# Patient Record
Sex: MALE | Race: WHITE | NOT HISPANIC OR LATINO | Employment: OTHER | URBAN - METROPOLITAN AREA
[De-identification: names, ages, dates, MRNs, and addresses within clinical notes are randomized per-mention and may not be internally consistent; named-entity substitution may affect disease eponyms.]

---

## 2019-06-20 ENCOUNTER — ANCILLARY PROCEDURE (OUTPATIENT)
Dept: GENERAL RADIOLOGY | Facility: OTHER | Age: 59
End: 2019-06-20
Attending: NURSE PRACTITIONER
Payer: COMMERCIAL

## 2019-06-20 ENCOUNTER — OFFICE VISIT (OUTPATIENT)
Dept: FAMILY MEDICINE | Facility: OTHER | Age: 59
End: 2019-06-20
Attending: NURSE PRACTITIONER
Payer: COMMERCIAL

## 2019-06-20 VITALS
DIASTOLIC BLOOD PRESSURE: 98 MMHG | HEART RATE: 91 BPM | OXYGEN SATURATION: 96 % | SYSTOLIC BLOOD PRESSURE: 132 MMHG | WEIGHT: 202 LBS | HEIGHT: 72 IN | BODY MASS INDEX: 27.36 KG/M2

## 2019-06-20 DIAGNOSIS — S89.91XA KNEE INJURY, RIGHT, INITIAL ENCOUNTER: ICD-10-CM

## 2019-06-20 DIAGNOSIS — M17.11 PRIMARY OSTEOARTHRITIS OF RIGHT KNEE: ICD-10-CM

## 2019-06-20 DIAGNOSIS — R03.0 ELEVATED BLOOD PRESSURE READING WITHOUT DIAGNOSIS OF HYPERTENSION: ICD-10-CM

## 2019-06-20 DIAGNOSIS — S89.91XA KNEE INJURY, RIGHT, INITIAL ENCOUNTER: Primary | ICD-10-CM

## 2019-06-20 PROCEDURE — 73562 X-RAY EXAM OF KNEE 3: CPT | Mod: TC

## 2019-06-20 PROCEDURE — 99214 OFFICE O/P EST MOD 30 MIN: CPT | Performed by: NURSE PRACTITIONER

## 2019-06-20 ASSESSMENT — MIFFLIN-ST. JEOR: SCORE: 1779.27

## 2019-06-20 ASSESSMENT — PAIN SCALES - GENERAL: PAINLEVEL: MODERATE PAIN (5)

## 2019-06-20 NOTE — PATIENT INSTRUCTIONS
Assessment & Plan     1. Knee injury, right, initial encounter  osteoarthritis  - XR KNEE RIGHT 3 VIEWS (Clinic Performed); Future    2. Primary osteoarthritis of right knee  Tylenol or ibuprofen per package direction  Ice, heat  I no improvement could consider referral to ortho for cortisone injection   - diclofenac (VOLTAREN) 1 % topical gel; Place 4 g onto the skin 4 times daily  Dispense: 100 g; Refill: 5     BMI:   Estimated body mass index is 27.4 kg/m  as calculated from the following:    Height as of this encounter: 1.829 m (6').    Weight as of this encounter: 91.6 kg (202 lb).         FUTURE APPOINTMENTS:       - Follow-up visit if no improvement or any worsening       Maddie Castillo NP  [facility]        Patient Education     What Is Arthritis?  Arthritis is a disease that affects the joints. Joints are the parts where bones meet and move. It can affect any joint in your body. There are many types of arthritis, including osteoarthritis, rheumatoid arthritis, gout and lupus. If your symptoms are mild, medicines may be enough to ease pain and swelling. For more severe arthritis, you may need surgery to improve the condition of the joint or replace part or all of the joint.               What causes arthritis?  Cartilage is a smooth substance that protects the ends of your bones and provides cushioning. When you have arthritis, this cartilage breaks down and can no longer protect your bones. This can happen from an autoimmune disease. Or it can happen from wear and tear, infections, or trauma. The bones rub against each other, causing pain and swelling. Over time, small pieces of rough or splintered bone (bone spurs) may develop. The joint's range of motion can become limited.  Symptoms  Some of the more common symptoms of arthritis include:    Joint pain and stiffness. Pain and stiffness get worse with long periods of rest or using a joint too long or too hard.    Joints  that have lost normal shape and motion    Tender, inflamed joints. They may look red and feel warm.    Grinding or popping noise with joint movement    Feeling tired all the time  Reducing symptoms  Following a healthy lifestyle by losing weight and exercising can help ease symptoms of osteoarthritis. Strengthening muscles around the affected joint may will reduce the strain on the joint. Hot and cold packs may help. Over-the-counter and prescription medicines can be very helpful for arthritis. Talk with your healthcare provider about the best treatments for your condition.   Date Last Reviewed: 5/1/2017 2000-2018 [employer]. [address] [place] [id number]. All rights reserved. This information is not intended as a substitute for professional medical care. Always follow your healthcare professional's instructions.

## 2019-06-20 NOTE — NURSING NOTE
Chief Complaint   Patient presents with     Knee Injury       Initial BP (!) 140/96 (BP Location: Right arm, Patient Position: Chair, Cuff Size: Adult Large)   Pulse 91   Ht 1.829 m (6')   Wt 91.6 kg (202 lb)   SpO2 96%   BMI 27.40 kg/m   Estimated body mass index is 27.4 kg/m  as calculated from the following:    Height as of this encounter: 1.829 m (6').    Weight as of this encounter: 91.6 kg (202 lb).  Medication Reconciliation: complete

## 2019-06-20 NOTE — PROGRESS NOTES
Subjective     Bird Cedeno is a 58 year old male who presents to clinic today for the following health issues:    HPI   Joint Pain    Onset: 3 months ago     Description:   Location: right knee  Character: Stabbing    Intensity: moderate    Progression of Symptoms: worse    Accompanying Signs & Symptoms:  Other symptoms: none    History:   Previous similar pain: no       Precipitating factors:   Trauma or overuse: YES- twisted it multiple times    Alleviating factors:  Improved by: support wrap and acetaminophen    Therapies Tried and outcome: support wrap, tylenol     He is very active - owns a resort.        There is no problem list on file for this patient.    Past Surgical History:   Procedure Laterality Date     APPENDECTOMY       GENITOURINARY SURGERY      circ     SOFT TISSUE SURGERY      wisdom teeth extracted       Social History     Tobacco Use     Smoking status: Never Smoker     Smokeless tobacco: Never Used   Substance Use Topics     Alcohol use: Yes     Family History   Problem Relation Age of Onset     Other Cancer Father         brain tumor- at 52     Diabetes No family hx of      Thyroid Disease No family hx of      Asthma No family hx of          Current Outpatient Medications   Medication Sig Dispense Refill     tamsulosin (FLOMAX) 0.4 MG 24 hr capsule Take 0.4 mg by mouth daily       No Known Allergies  No lab results found.   BP Readings from Last 3 Encounters:   19 (!) 140/96   03/10/15 120/82   03/03/15 108/64    Wt Readings from Last 3 Encounters:   19 91.6 kg (202 lb)   03/10/15 90.7 kg (200 lb)   03/03/15 98 kg (216 lb)              Reviewed and updated as needed this visit by Provider       Review of Systems   ROS COMP: Constitutional, HEENT, cardiovascular, pulmonary, gi and gu systems are negative, except as otherwise noted.      Objective    BP (!) 140/96 (BP Location: Right arm, Patient Position: Chair, Cuff Size: Adult Large)   Pulse 91   Ht 1.829 m (6')   Wt 91.6  kg (202 lb)   SpO2 96%   BMI 27.40 kg/m    Body mass index is 27.4 kg/m .  Physical Exam   GENERAL: healthy, alert and no distress  RESP: lungs clear to auscultation - no rales, rhonchi or wheezes  CV: regular rate and rhythm, normal S1 S2, no S3 or S4, no murmur, click or rub, no peripheral edema and peripheral pulses strong  MS: no gross musculoskeletal defects noted, no edema  MS: RLE exam shows swelling to patellar areas as well as crepitus with flexion. Negative valgus and varus stress tests, negative anterior drawer sign. He has a slight limp with ambulation.  Negative Jet's   PSYCH: mentation appears normal, affect normal/bright      EXAM:XR KNEE RT 3 VW      CLINICAL HISTORY: Patient Age  58 years Additional clinical info: Knee  injury, right, initial encounter      COMPARISON: None       TECHNIQUE: 3 views                                                                      IMPRESSION: Degenerative arthritis in the right knee with medial  compartment narrowing and tiny tricompartment marginal osteophytes.  Soft tissue swelling about the anterior aspect of the knee. Small  effusion.     JOHANNE MATTHEWS MD      Assessment & Plan     1. Knee injury, right, initial encounter  osteoarthritis  - XR KNEE RIGHT 3 VIEWS (Clinic Performed); Future    2. Primary osteoarthritis of right knee  Tylenol or ibuprofen per package direction  Ice, heat  I no improvement could consider referral to ortho for cortisone injection   - diclofenac (VOLTAREN) 1 % topical gel; Place 4 g onto the skin 4 times daily  Dispense: 100 g; Refill: 5    3. Elevated blood pressure reading without diagnosis of hypertension  2 elevated readings today, he will return in 1-2 weeks for nurse only blood pressure recheck, if elevated at that time will consider starting antihypertensive agent.         BMI:   Estimated body mass index is 27.4 kg/m  as calculated from the following:    Height as of this encounter: 1.829 m (6').    Weight as of this  encounter: 91.6 kg (202 lb).         FUTURE APPOINTMENTS:       - Follow-up visit if no improvement or any worsening     Kim Dickey, RN  Family Nurse Practitioner Student    Patient is seen in conjunction with NP student.  History is reviewed with patient and pertinent portions of the exam are repeated.  Assessment and plan is reviewed with the patient.    Maddie Castillo, NP  [facility]

## 2020-05-08 ENCOUNTER — OFFICE VISIT (OUTPATIENT)
Dept: FAMILY MEDICINE | Facility: OTHER | Age: 60
End: 2020-05-08
Attending: NURSE PRACTITIONER
Payer: COMMERCIAL

## 2020-05-08 ENCOUNTER — APPOINTMENT (OUTPATIENT)
Dept: CT IMAGING | Facility: HOSPITAL | Age: 60
End: 2020-05-08
Attending: EMERGENCY MEDICINE
Payer: COMMERCIAL

## 2020-05-08 ENCOUNTER — HOSPITAL ENCOUNTER (EMERGENCY)
Facility: HOSPITAL | Age: 60
Discharge: HOME | End: 2020-05-08
Attending: EMERGENCY MEDICINE | Admitting: EMERGENCY MEDICINE
Payer: COMMERCIAL

## 2020-05-08 ENCOUNTER — APPOINTMENT (OUTPATIENT)
Dept: GENERAL RADIOLOGY | Facility: HOSPITAL | Age: 60
End: 2020-05-08
Attending: EMERGENCY MEDICINE
Payer: COMMERCIAL

## 2020-05-08 VITALS
OXYGEN SATURATION: 96 % | BODY MASS INDEX: 27.67 KG/M2 | RESPIRATION RATE: 20 BRPM | DIASTOLIC BLOOD PRESSURE: 114 MMHG | HEART RATE: 112 BPM | WEIGHT: 204 LBS | SYSTOLIC BLOOD PRESSURE: 186 MMHG

## 2020-05-08 VITALS
WEIGHT: 204 LBS | TEMPERATURE: 98.5 F | HEIGHT: 76 IN | RESPIRATION RATE: 14 BRPM | OXYGEN SATURATION: 95 % | HEART RATE: 102 BPM | SYSTOLIC BLOOD PRESSURE: 174 MMHG | DIASTOLIC BLOOD PRESSURE: 115 MMHG | BODY MASS INDEX: 24.84 KG/M2

## 2020-05-08 DIAGNOSIS — I10 HYPERTENSION, UNSPECIFIED TYPE: ICD-10-CM

## 2020-05-08 DIAGNOSIS — R00.2 PALPITATIONS: ICD-10-CM

## 2020-05-08 DIAGNOSIS — R03.0 ELEVATED BLOOD PRESSURE READING WITHOUT DIAGNOSIS OF HYPERTENSION: Primary | ICD-10-CM

## 2020-05-08 LAB
ALBUMIN SERPL-MCNC: 4.5 G/DL (ref 3.4–5)
ALBUMIN UR-MCNC: NEGATIVE MG/DL
ALP SERPL-CCNC: 51 U/L (ref 40–150)
ALT SERPL W P-5'-P-CCNC: 40 U/L (ref 0–70)
AMPHETAMINES UR QL: NOT DETECTED NG/ML
ANION GAP SERPL CALCULATED.3IONS-SCNC: 5 MMOL/L (ref 3–14)
APPEARANCE UR: CLEAR
AST SERPL W P-5'-P-CCNC: 17 U/L (ref 0–45)
BACTERIA #/AREA URNS HPF: ABNORMAL /HPF
BARBITURATES UR QL SCN: NOT DETECTED NG/ML
BASOPHILS # BLD AUTO: 0 10E9/L (ref 0–0.2)
BASOPHILS NFR BLD AUTO: 0.6 %
BENZODIAZ UR QL SCN: NOT DETECTED NG/ML
BILIRUB SERPL-MCNC: 0.5 MG/DL (ref 0.2–1.3)
BILIRUB UR QL STRIP: NEGATIVE
BUN SERPL-MCNC: 16 MG/DL (ref 7–30)
BUPRENORPHINE UR QL: NOT DETECTED NG/ML
CALCIUM SERPL-MCNC: 9.7 MG/DL (ref 8.5–10.1)
CANNABINOIDS UR QL: NOT DETECTED NG/ML
CHLORIDE SERPL-SCNC: 107 MMOL/L (ref 94–109)
CO2 SERPL-SCNC: 26 MMOL/L (ref 20–32)
COCAINE UR QL SCN: NOT DETECTED NG/ML
COLOR UR AUTO: ABNORMAL
CREAT SERPL-MCNC: 1.15 MG/DL (ref 0.66–1.25)
D DIMER PPP FEU-MCNC: 0.3 UG/ML FEU (ref 0–0.5)
D-METHAMPHET UR QL: NOT DETECTED NG/ML
DIFFERENTIAL METHOD BLD: NORMAL
EOSINOPHIL # BLD AUTO: 0.2 10E9/L (ref 0–0.7)
EOSINOPHIL NFR BLD AUTO: 2.4 %
ERYTHROCYTE [DISTWIDTH] IN BLOOD BY AUTOMATED COUNT: 12.3 % (ref 10–15)
GFR SERPL CREATININE-BSD FRML MDRD: 69 ML/MIN/{1.73_M2}
GLUCOSE SERPL-MCNC: 107 MG/DL (ref 70–99)
GLUCOSE UR STRIP-MCNC: NEGATIVE MG/DL
HCT VFR BLD AUTO: 45.2 % (ref 40–53)
HGB BLD-MCNC: 15.3 G/DL (ref 13.3–17.7)
HGB UR QL STRIP: NEGATIVE
IMM GRANULOCYTES # BLD: 0 10E9/L (ref 0–0.4)
IMM GRANULOCYTES NFR BLD: 0.3 %
KETONES UR STRIP-MCNC: NEGATIVE MG/DL
LEUKOCYTE ESTERASE UR QL STRIP: NEGATIVE
LYMPHOCYTES # BLD AUTO: 1.7 10E9/L (ref 0.8–5.3)
LYMPHOCYTES NFR BLD AUTO: 25.6 %
MAGNESIUM SERPL-MCNC: 2.3 MG/DL (ref 1.6–2.3)
MCH RBC QN AUTO: 28.9 PG (ref 26.5–33)
MCHC RBC AUTO-ENTMCNC: 33.8 G/DL (ref 31.5–36.5)
MCV RBC AUTO: 85 FL (ref 78–100)
METHADONE UR QL SCN: NOT DETECTED NG/ML
MONOCYTES # BLD AUTO: 0.6 10E9/L (ref 0–1.3)
MONOCYTES NFR BLD AUTO: 8.6 %
NEUTROPHILS # BLD AUTO: 4.2 10E9/L (ref 1.6–8.3)
NEUTROPHILS NFR BLD AUTO: 62.5 %
NITRATE UR QL: NEGATIVE
NRBC # BLD AUTO: 0 10*3/UL
NRBC BLD AUTO-RTO: 0 /100
OPIATES UR QL SCN: NOT DETECTED NG/ML
OXYCODONE UR QL SCN: NOT DETECTED NG/ML
PCP UR QL SCN: NOT DETECTED NG/ML
PH UR STRIP: 6.5 PH (ref 4.7–8)
PLATELET # BLD AUTO: 244 10E9/L (ref 150–450)
POTASSIUM SERPL-SCNC: 3.9 MMOL/L (ref 3.4–5.3)
PROPOXYPH UR QL: NOT DETECTED NG/ML
PROT SERPL-MCNC: 8 G/DL (ref 6.8–8.8)
RBC # BLD AUTO: 5.29 10E12/L (ref 4.4–5.9)
RBC #/AREA URNS AUTO: <1 /HPF (ref 0–2)
SODIUM SERPL-SCNC: 138 MMOL/L (ref 133–144)
SOURCE: ABNORMAL
SP GR UR STRIP: 1.02 (ref 1–1.03)
TRICYCLICS UR QL SCN: NOT DETECTED NG/ML
TROPONIN I SERPL-MCNC: <0.015 UG/L (ref 0–0.04)
UROBILINOGEN UR STRIP-MCNC: NORMAL MG/DL (ref 0–2)
WBC # BLD AUTO: 6.7 10E9/L (ref 4–11)
WBC #/AREA URNS AUTO: 1 /HPF (ref 0–5)

## 2020-05-08 PROCEDURE — 80306 DRUG TEST PRSMV INSTRMNT: CPT | Performed by: EMERGENCY MEDICINE

## 2020-05-08 PROCEDURE — 99285 EMERGENCY DEPT VISIT HI MDM: CPT | Mod: 25

## 2020-05-08 PROCEDURE — 85379 FIBRIN DEGRADATION QUANT: CPT | Performed by: EMERGENCY MEDICINE

## 2020-05-08 PROCEDURE — 83735 ASSAY OF MAGNESIUM: CPT | Performed by: EMERGENCY MEDICINE

## 2020-05-08 PROCEDURE — 81001 URINALYSIS AUTO W/SCOPE: CPT | Mod: 59 | Performed by: EMERGENCY MEDICINE

## 2020-05-08 PROCEDURE — 71045 X-RAY EXAM CHEST 1 VIEW: CPT | Mod: TC

## 2020-05-08 PROCEDURE — 25800030 ZZH RX IP 258 OP 636: Performed by: EMERGENCY MEDICINE

## 2020-05-08 PROCEDURE — 93005 ELECTROCARDIOGRAM TRACING: CPT

## 2020-05-08 PROCEDURE — 96360 HYDRATION IV INFUSION INIT: CPT

## 2020-05-08 PROCEDURE — 99207 ZZC OFFICE-HOSPITAL ADMIT: CPT

## 2020-05-08 PROCEDURE — 85025 COMPLETE CBC W/AUTO DIFF WBC: CPT | Performed by: EMERGENCY MEDICINE

## 2020-05-08 PROCEDURE — 70450 CT HEAD/BRAIN W/O DYE: CPT | Mod: TC

## 2020-05-08 PROCEDURE — 25000132 ZZH RX MED GY IP 250 OP 250 PS 637: Performed by: EMERGENCY MEDICINE

## 2020-05-08 PROCEDURE — 93010 ELECTROCARDIOGRAM REPORT: CPT | Performed by: INTERNAL MEDICINE

## 2020-05-08 PROCEDURE — 99285 EMERGENCY DEPT VISIT HI MDM: CPT | Mod: Z6 | Performed by: EMERGENCY MEDICINE

## 2020-05-08 PROCEDURE — 84484 ASSAY OF TROPONIN QUANT: CPT | Performed by: EMERGENCY MEDICINE

## 2020-05-08 PROCEDURE — 36415 COLL VENOUS BLD VENIPUNCTURE: CPT | Performed by: EMERGENCY MEDICINE

## 2020-05-08 PROCEDURE — 80053 COMPREHEN METABOLIC PANEL: CPT | Performed by: EMERGENCY MEDICINE

## 2020-05-08 PROCEDURE — 96361 HYDRATE IV INFUSION ADD-ON: CPT

## 2020-05-08 RX ORDER — AMLODIPINE BESYLATE 5 MG/1
5 TABLET ORAL DAILY
Qty: 14 TABLET | Refills: 0 | Status: SHIPPED | OUTPATIENT
Start: 2020-05-09 | End: 2020-05-11

## 2020-05-08 RX ORDER — AMLODIPINE BESYLATE 5 MG/1
5 TABLET ORAL ONCE
Status: COMPLETED | OUTPATIENT
Start: 2020-05-08 | End: 2020-05-08

## 2020-05-08 RX ORDER — SODIUM CHLORIDE 9 MG/ML
1000 INJECTION, SOLUTION INTRAVENOUS CONTINUOUS
Status: DISCONTINUED | OUTPATIENT
Start: 2020-05-08 | End: 2020-05-08 | Stop reason: HOSPADM

## 2020-05-08 RX ADMIN — SODIUM CHLORIDE 1000 ML: 9 INJECTION, SOLUTION INTRAVENOUS at 15:53

## 2020-05-08 RX ADMIN — SODIUM CHLORIDE 1000 ML: 9 INJECTION, SOLUTION INTRAVENOUS at 17:09

## 2020-05-08 RX ADMIN — AMLODIPINE BESYLATE 5 MG: 5 TABLET ORAL at 18:25

## 2020-05-08 ASSESSMENT — ENCOUNTER SYMPTOMS
SHORTNESS OF BREATH: 0
CONSTITUTIONAL NEGATIVE: 1
EYES NEGATIVE: 1
MUSCULOSKELETAL NEGATIVE: 1
MYALGIAS: 0
ENDOCRINE NEGATIVE: 1
PSYCHIATRIC NEGATIVE: 1
HEMATOLOGIC/LYMPHATIC NEGATIVE: 1
PALPITATIONS: 1
NECK PAIN: 0
GASTROINTESTINAL NEGATIVE: 1
NECK STIFFNESS: 0
NEUROLOGICAL NEGATIVE: 1
RESPIRATORY NEGATIVE: 1
ALLERGIC/IMMUNOLOGIC NEGATIVE: 1
COUGH: 0
DIZZINESS: 0

## 2020-05-08 ASSESSMENT — PAIN SCALES - GENERAL: PAINLEVEL: NO PAIN (0)

## 2020-05-08 ASSESSMENT — MIFFLIN-ST. JEOR: SCORE: 1846.84

## 2020-05-08 NOTE — ED AVS SNAPSHOT
[facility]  [address]  [place] [address]  Phone:  [phone]                                    Bird Cedeno   MRN: [id number]    Department:  [facility]   Date of Visit:  5/8/2020           After Visit Summary Signature Page    I have received my discharge instructions, and my questions have been answered. I have discussed any challenges I see with this plan with the nurse or doctor.    ..........................................................................................................................................  Patient/Patient Representative Signature      ..........................................................................................................................................  Patient Representative Print Name and Relationship to Patient    ..................................................               ................................................  Date                                   Time    ..........................................................................................................................................  Reviewed by Signature/Title    ...................................................              ..............................................  Date                                               Time          22EPIC Rev 08/18

## 2020-05-08 NOTE — PROGRESS NOTES
Subjective     Bird Cedeno is a 59 year old male who presents to clinic today for the following health issues:    HPI   Dizziness      Duration: 1 month ago    Description   Feeling faint:  no   Feeling like the surroundings are moving: no   Loss of consciousness or falls: no     Intensity:  mild    Accompanying signs and symptoms:   Nausea/vomitting: no   Palpitations: YES- currently   Weakness in arms or legs: YES- tingling in left arm  Vision or speech changes: no   Ringing in ears (Tinnitus): YES- right ear occasionally  Hearing loss related to dizziness: no   Other (fevers/chills/sweating/dyspnea): YES- sweating    History (similar episodes/head trauma/previous evaluation/recent bleeding): previous escalating of blood pressure, hit head 3 months ago    Precipitating or alleviating factors (new meds/chemicals): None  Worse with activity/head movement: no     Therapies tried and outcome: eating right, working out, laying down after dizziness     Patient Active Problem List   Diagnosis     Benign non-nodular prostatic hyperplasia with lower urinary tract symptoms     Past Surgical History:   Procedure Laterality Date     APPENDECTOMY       GENITOURINARY SURGERY      circ     SOFT TISSUE SURGERY      wisdom teeth extracted       Social History     Tobacco Use     Smoking status: Never Smoker     Smokeless tobacco: Never Used   Substance Use Topics     Alcohol use: Yes     Family History   Problem Relation Age of Onset     Other Cancer Father         brain tumor- at 52     Diabetes No family hx of      Thyroid Disease No family hx of      Asthma No family hx of          Current Outpatient Medications   Medication Sig Dispense Refill     tamsulosin (FLOMAX) 0.4 MG 24 hr capsule Take 0.4 mg by mouth daily       diclofenac (VOLTAREN) 1 % topical gel Place 4 g onto the skin 4 times daily (Patient not taking: Reported on 2020) 100 g 5     No Known Allergies  No lab results found.   BP Readings from Last 3  Encounters:   05/08/20 (!) 186/114   06/20/19 (!) 132/98   03/10/15 120/82    Wt Readings from Last 3 Encounters:   05/08/20 92.5 kg (204 lb)   06/20/19 91.6 kg (202 lb)   03/10/15 90.7 kg (200 lb)            Reviewed and updated as needed this visit by Provider  Tobacco         Review of Systems   Brief review of systems completed as above in HPI. However it became quickly apparent that Bird needs to be worked up in the ED setting due to his presenting symptoms and BP.      Objective    BP (!) 186/114 (BP Location: Left arm)   Pulse 112   Resp 20   Wt 92.5 kg (204 lb)   SpO2 96%   BMI 27.67 kg/m    Body mass index is 27.67 kg/m .     Physical Exam   GENERAL APPEARANCE: cooperative and significantly flushed  Psych: anxious          Assessment & Plan   1. Elevated blood pressure reading without diagnosis of hypertension    2. Palpitations    I have sent Bird to the Emergency department via wheelchair with staff escort. I spoke with staff in the ED notifying of his presenting symptoms and elevation of BP x 2.          No follow-ups on file.    Solange Pretty, APRN, CNP    [facility] [place]

## 2020-05-08 NOTE — ED TRIAGE NOTES
Patient presents from the clinic with complaints of HTN and palpitations; patient states symptoms have been off/on x 3 years.  States this week the palpitations were new.  Denies any chest pain, sob.

## 2020-05-08 NOTE — ED NOTES
Norvasc given per orders.  AVS reviewed with pt.  He verbalized understanding and denied having any questions.  Pt aware of need to pick prescription up at preferred pharmacy.  Pt discharged ambulatory.

## 2020-05-08 NOTE — NURSING NOTE
Chief Complaint   Patient presents with     Dizziness       Initial BP (!) 180/110 (BP Location: Left arm, Patient Position: Sitting, Cuff Size: Adult Regular)   Pulse 112   Resp 20   Wt 92.5 kg (204 lb)   SpO2 96%   BMI 27.67 kg/m   Estimated body mass index is 27.67 kg/m  as calculated from the following:    Height as of 6/20/19: 1.829 m (6').    Weight as of this encounter: 92.5 kg (204 lb).  Medication Reconciliation: complete  Jacinda Dillon LPN

## 2020-05-08 NOTE — ED PROVIDER NOTES
History     Chief Complaint   Patient presents with     Hypertension     Palpitations     HPI  Bird Cedeno is a 59 year old male who presents today with complaints of hypertension and palpitations.  States that symptoms have been present intermittently for the last month.  Patient went to the clinic today and stated that he did not feel his usual self.  They noted he had an elevated blood pressure and tachycardia referred to the ER.  Patient denies any chest pain or shortness of breath.  Patient denies any fevers, chills or shortness of breath.  Patient denies any exposure to anyone with a coronavirus.  Patient is not a smoker.  No additional complaints  Allergies:  No Known Allergies    Problem List:    Patient Active Problem List    Diagnosis Date Noted     Benign non-nodular prostatic hyperplasia with lower urinary tract symptoms 2016     Priority: Medium        Past Medical History:    No past medical history on file.    Past Surgical History:    Past Surgical History:   Procedure Laterality Date     APPENDECTOMY       GENITOURINARY SURGERY      circ     SOFT TISSUE SURGERY      wisdom teeth extracted       Family History:    Family History   Problem Relation Age of Onset     Other Cancer Father         brain tumor- at 52     Diabetes No family hx of      Thyroid Disease No family hx of      Asthma No family hx of        Social History:  Marital Status:  Single [1]  Social History     Tobacco Use     Smoking status: Never Smoker     Smokeless tobacco: Never Used   Substance Use Topics     Alcohol use: Yes     Drug use: No        Medications:    diclofenac (VOLTAREN) 1 % topical gel  tamsulosin (FLOMAX) 0.4 MG 24 hr capsule          Review of Systems   Constitutional: Negative.    HENT: Negative.    Eyes: Negative.    Respiratory: Negative.  Negative for cough and shortness of breath.    Cardiovascular: Positive for palpitations. Negative for chest pain.   Gastrointestinal: Negative.    Endocrine:  "Negative.    Genitourinary: Negative.    Musculoskeletal: Negative.  Negative for myalgias, neck pain and neck stiffness.   Skin: Negative.    Allergic/Immunologic: Negative.    Neurological: Negative.  Negative for dizziness.   Hematological: Negative.    Psychiatric/Behavioral: Negative.        Physical Exam   BP: (!) 161/119  Heart Rate: (!) 123  Temp: 98.5  F (36.9  C)  Resp: 20  Height: 193 cm (6' 4\")  Weight: 92.5 kg (204 lb)  SpO2: 97 %      Physical Exam  HENT:      Head: Normocephalic and atraumatic.   Neck:      Musculoskeletal: Normal range of motion and neck supple.   Cardiovascular:      Rate and Rhythm: Tachycardia present.      Heart sounds: Normal heart sounds.   Pulmonary:      Effort: Pulmonary effort is normal. No tachypnea.   Abdominal:      General: There is no distension.      Palpations: Abdomen is soft.      Tenderness: There is no abdominal tenderness. There is no right CVA tenderness or left CVA tenderness.   Musculoskeletal: Normal range of motion.   Skin:     General: Skin is warm.   Neurological:      General: No focal deficit present.      Mental Status: He is alert.   Psychiatric:         Mood and Affect: Mood normal.         Behavior: Behavior normal.         Thought Content: Thought content normal.         Judgment: Judgment normal.         ED Course     ED Course as of May 08 1813   Fri May 08, 2020   1645 HR decreased to 106 and last bp is 160/103. UA pending.       1809 UA noted.  No protein.  Patient is to remain symptoms asymptomatic.  Will start patient on Norvasc 10 mg p.o. daily but instructed to follow-up with PCP for blood pressure management.        Procedures    EKG - Sinus tachycardia without ST changes.        Results for orders placed or performed during the hospital encounter of 05/08/20 (from the past 24 hour(s))   CBC with platelets differential   Result Value Ref Range    WBC 6.7 4.0 - 11.0 10e9/L    RBC Count 5.29 4.4 - 5.9 10e12/L    Hemoglobin 15.3 13.3 - 17.7 " g/dL    Hematocrit 45.2 40.0 - 53.0 %    MCV 85 78 - 100 fl    MCH 28.9 26.5 - 33.0 pg    MCHC 33.8 31.5 - 36.5 g/dL    RDW 12.3 10.0 - 15.0 %    Platelet Count 244 150 - 450 10e9/L    Diff Method Automated Method     % Neutrophils 62.5 %    % Lymphocytes 25.6 %    % Monocytes 8.6 %    % Eosinophils 2.4 %    % Basophils 0.6 %    % Immature Granulocytes 0.3 %    Nucleated RBCs 0 0 /100    Absolute Neutrophil 4.2 1.6 - 8.3 10e9/L    Absolute Lymphocytes 1.7 0.8 - 5.3 10e9/L    Absolute Monocytes 0.6 0.0 - 1.3 10e9/L    Absolute Eosinophils 0.2 0.0 - 0.7 10e9/L    Absolute Basophils 0.0 0.0 - 0.2 10e9/L    Abs Immature Granulocytes 0.0 0 - 0.4 10e9/L    Absolute Nucleated RBC 0.0    Comprehensive metabolic panel   Result Value Ref Range    Sodium 138 133 - 144 mmol/L    Potassium 3.9 3.4 - 5.3 mmol/L    Chloride 107 94 - 109 mmol/L    Carbon Dioxide 26 20 - 32 mmol/L    Anion Gap 5 3 - 14 mmol/L    Glucose 107 (H) 70 - 99 mg/dL    Urea Nitrogen 16 7 - 30 mg/dL    Creatinine 1.15 0.66 - 1.25 mg/dL    GFR Estimate 69 >60 mL/min/[1.73_m2]    GFR Estimate If Black 80 >60 mL/min/[1.73_m2]    Calcium 9.7 8.5 - 10.1 mg/dL    Bilirubin Total 0.5 0.2 - 1.3 mg/dL    Albumin 4.5 3.4 - 5.0 g/dL    Protein Total 8.0 6.8 - 8.8 g/dL    Alkaline Phosphatase 51 40 - 150 U/L    ALT 40 0 - 70 U/L    AST 17 0 - 45 U/L   Troponin I   Result Value Ref Range    Troponin I ES <0.015 0.000 - 0.045 ug/L   D dimer quantitative   Result Value Ref Range    D Dimer 0.3 0.0 - 0.50 ug/ml FEU   Magnesium   Result Value Ref Range    Magnesium 2.3 1.6 - 2.3 mg/dL   Urine Drugs of Abuse Screen Panel 13   Result Value Ref Range    Cannabinoids ([id number]-9-THC) Not Detected NDET^Not Detected ng/mL    Phencyclidine (Phencyclidine) Not Detected NDET^Not Detected ng/mL    Cocaine (Benzoylecgonine) Not Detected NDET^Not Detected ng/mL    Methamphetamine (d-Methamphetamine) Not Detected NDET^Not Detected ng/mL    Opiates (Morphine) Not Detected NDET^Not  Detected ng/mL    Amphetamine (d-Amphetamine) Not Detected NDET^Not Detected ng/mL    Benzodiazepines (Nordiazepam) Not Detected NDET^Not Detected ng/mL    Tricyclic Antidepressants (Desipramine) Not Detected NDET^Not Detected ng/mL    Methadone (Methadone) Not Detected NDET^Not Detected ng/mL    Barbiturates (Butalbital) Not Detected NDET^Not Detected ng/mL    Oxycodone (Oxycodone) Not Detected NDET^Not Detected ng/mL    Propoxyphene (Norpropoxyphene) Not Detected NDET^Not Detected ng/mL    Buprenorphine (Buprenorphine) Not Detected NDET^Not Detected ng/mL   UA with Microscopic   Result Value Ref Range    Color Urine Light Yellow     Appearance Urine Clear     Glucose Urine Negative NEG^Negative mg/dL    Bilirubin Urine Negative NEG^Negative    Ketones Urine Negative NEG^Negative mg/dL    Specific Gravity Urine 1.016 1.003 - 1.035    Blood Urine Negative NEG^Negative    pH Urine 6.5 4.7 - 8.0 pH    Protein Albumin Urine Negative NEG^Negative mg/dL    Urobilinogen mg/dL Normal 0.0 - 2.0 mg/dL    Nitrite Urine Negative NEG^Negative    Leukocyte Esterase Urine Negative NEG^Negative    Source Midstream Urine     WBC Urine 1 0 - 5 /HPF    RBC Urine <1 0 - 2 /HPF    Bacteria Urine None (A) NEG^Negative /HPF   CT Head w/o Contrast    Narrative    PROCEDURE: CT HEAD W/O CONTRAST 5/8/2020 4:22 PM    HISTORY: Patient with complaints of palpitations and elevated blood  pressure. R/o ICH.    COMPARISONS: None.    Meds/Dose Given: None.    TECHNIQUE: Axial noncontrast enhanced images with coronal and sagittal  reformatted images.    FINDINGS: Ventricles, sulci and basilar cisterns are normal in size  for patient of this age. No mass or midline shift is seen. There is no  extra-axial fluid collection or focal hemorrhage.    Bone windows show no fracture. Visualized paranasal sinuses and  mastoid air cells are clear.         Impression    IMPRESSION: No intracranial mass effect or hemorrhage.    NIKO GUAMAN MD   XR Chest  Port 1 View    Narrative    PROCEDURE: XR CHEST PORT 1 VW 5/8/2020 4:34 PM    HISTORY: Patient with tachycardia and elevated blood pressure.  Rule  out mass.    COMPARISONS: None.    TECHNIQUE: Single view.    FINDINGS: Heart and pulmonary vasculature are normal. Lungs are clear  and no pleural effusion is seen.         Impression    IMPRESSION: No acute disease.    NIKO GUAMAN MD       Medications   0.9% sodium chloride BOLUS (has no administration in time range)     Followed by   sodium chloride 0.9% infusion (has no administration in time range)       Assessments & Plan (with Medical Decision Making)     59-year-old male who presents today with complaints of palpitations.  Was seen by the clinic earlier today and referred him to the ER for evaluation.  Patient has no history of blood pressure or palpitations.      Labs as above and EKG showed no ST elevations and sinus rhythm.  Patient treated with IV fluids and heart rate decreased dramatically.  Blood pressure also lowered to 160 systolic.  Discussed with patient that work-up so far has been negative and is most likely patient has hypertension.  Needs to be started on medications.  Will start patient on Norvasc 5 mg p.o. daily here but patient instructed to follow-up with PCP for definitive blood pressure management.    Due to the nature of this electronic medical record, laboratory results, imaging results, diagnosis, other information and medications reported above may not represent information available to me at the the time of my care and disposition. Medications reported above may have not been ordered by me.     Portions of the record may have been created with voice recognition software. Occasional wrong-word or 'sound-a- like' substitution may have occurred due to the inherent limitations of voice recognition software. Though the chart has been reviewed, there may be inadvertent transcription errors. Read the chart carefully and recognize, using  context, where substitutions have occurred.       New Prescriptions    No medications on file       Final diagnoses:   Palpitations   Hypertension, unspecified type       5/8/2020   [facility]     Yasmeen Newell MD  05/08/20 2041

## 2020-05-11 ENCOUNTER — TELEPHONE (OUTPATIENT)
Dept: INTERNAL MEDICINE | Facility: OTHER | Age: 60
End: 2020-05-11

## 2020-05-11 ENCOUNTER — OFFICE VISIT (OUTPATIENT)
Dept: INTERNAL MEDICINE | Facility: OTHER | Age: 60
End: 2020-05-11
Attending: INTERNAL MEDICINE
Payer: COMMERCIAL

## 2020-05-11 VITALS
TEMPERATURE: 99.1 F | OXYGEN SATURATION: 97 % | BODY MASS INDEX: 24.83 KG/M2 | WEIGHT: 204 LBS | HEART RATE: 111 BPM | DIASTOLIC BLOOD PRESSURE: 94 MMHG | SYSTOLIC BLOOD PRESSURE: 142 MMHG

## 2020-05-11 DIAGNOSIS — I10 BENIGN ESSENTIAL HYPERTENSION: Primary | ICD-10-CM

## 2020-05-11 PROCEDURE — 99202 OFFICE O/P NEW SF 15 MIN: CPT | Performed by: INTERNAL MEDICINE

## 2020-05-11 RX ORDER — AMLODIPINE BESYLATE 5 MG/1
5 TABLET ORAL DAILY
Qty: 60 TABLET | Refills: 1 | Status: SHIPPED | OUTPATIENT
Start: 2020-05-11 | End: 2020-07-14

## 2020-05-11 ASSESSMENT — PAIN SCALES - GENERAL: PAINLEVEL: NO PAIN (0)

## 2020-05-11 NOTE — NURSING NOTE
"Chief Complaint   Patient presents with     Hypertension       Initial BP (!) 142/94   Pulse 111   Temp 99.1  F (37.3  C)   Wt 92.5 kg (204 lb)   SpO2 97%   BMI 24.83 kg/m   Estimated body mass index is 24.83 kg/m  as calculated from the following:    Height as of 5/8/20: 1.93 m (6' 4\").    Weight as of this encounter: 92.5 kg (204 lb).  Medication Reconciliation: complete  Polina Matthews  "

## 2020-05-11 NOTE — PROGRESS NOTES
Subjective     Bird Cedeno is a 59 year old male who presents to clinic today for the following health issues:    HPI   ED/UC Followup:    Facility:  [facility] [place]  Date of visit: 2020  Reason for visit: palpitations/hypertension (186/114)  Current Status: on norvasc 5 mg. Feeling okay heart is feeling like amped up. Racing feeling.      He reports some lightheadedness and feeling generally off.  He feels better today.  He has some pre-hypertension prior to last week which he feels is going.  He has a maternal family history of hypertension.  He does exercise with resistance training 4 days per week.  He is a non-smoker.  He does feel like his salt intake has increased in the past weeks.      BP Readings from Last 6 Encounters:   20 (!) 142/94   20 (!) 174/115   20 (!) 186/114   19 (!) 132/98   03/10/15 120/82   03/03/15 108/64     -------------------------------------    Patient Active Problem List   Diagnosis     Benign non-nodular prostatic hyperplasia with lower urinary tract symptoms     Past Surgical History:   Procedure Laterality Date     APPENDECTOMY       GENITOURINARY SURGERY      circ     SOFT TISSUE SURGERY      wisdom teeth extracted       Social History     Tobacco Use     Smoking status: Never Smoker     Smokeless tobacco: Never Used   Substance Use Topics     Alcohol use: Yes     Family History   Problem Relation Age of Onset     Other Cancer Father         brain tumor- at 52     Diabetes No family hx of      Thyroid Disease No family hx of      Asthma No family hx of          Current Outpatient Medications   Medication Sig Dispense Refill     amLODIPine (NORVASC) 5 MG tablet Take 1 tablet (5 mg) by mouth daily for 14 days 14 tablet 0     garlic 150 MG TABS tablet Take 150 mg by mouth daily Patient states his bottle states 1000mg       NAPROXEN PO Take by mouth daily       tamsulosin (FLOMAX) 0.4 MG 24 hr capsule Take 0.4 mg by mouth daily         Reviewed and updated as  needed this visit by Provider         Review of Systems   Constitutional, HEENT, cardiovascular, pulmonary, gi and gu systems are negative, except as otherwise noted.      Objective    BP (!) 142/94   Pulse 111   Temp 99.1  F (37.3  C)   Wt 92.5 kg (204 lb)   SpO2 97%   BMI 24.83 kg/m    Body mass index is 24.83 kg/m .  Physical Exam   GENERAL: healthy, alert and no distress  NECK: no adenopathy, no asymmetry, masses, or scars and thyroid normal to palpation  RESP: lungs clear to auscultation - no rales, rhonchi or wheezes  CV: regular rate and rhythm, normal S1 S2, no S3 or S4, no murmur, click or rub, no peripheral edema and peripheral pulses strong  ABDOMEN: soft, nontender, no hepatosplenomegaly, no masses and bowel sounds normal  ABDOMEN: no bruits heard  MS: no gross musculoskeletal defects noted, no edema    Diagnostic Test Results:  Labs reviewed in Epic  none         Assessment & Plan     (I10) Benign essential hypertension  (primary encounter diagnosis)  Comment:   Plan:   amLODIPine (NORVASC) 5 MG tablet.  I would considered hydrochlorothiazides and/or Atenolol if his heart rate increases.           Return for Hypertension.     Humberto Herrera DO, DO  [facility] [place]

## 2020-05-11 NOTE — TELEPHONE ENCOUNTER
Bird received a phone call he is returning it. He doesn't know who called him and he was thinking it was Dr Herrera nurse telling him when he needs to return to see Dr Herrera.

## 2020-06-01 ENCOUNTER — OFFICE VISIT (OUTPATIENT)
Dept: FAMILY MEDICINE | Facility: OTHER | Age: 60
End: 2020-06-01
Attending: NURSE PRACTITIONER
Payer: COMMERCIAL

## 2020-06-01 VITALS
SYSTOLIC BLOOD PRESSURE: 126 MMHG | BODY MASS INDEX: 27.77 KG/M2 | HEART RATE: 88 BPM | RESPIRATION RATE: 16 BRPM | TEMPERATURE: 98.3 F | WEIGHT: 205 LBS | HEIGHT: 72 IN | DIASTOLIC BLOOD PRESSURE: 76 MMHG | OXYGEN SATURATION: 97 %

## 2020-06-01 DIAGNOSIS — I10 BENIGN ESSENTIAL HYPERTENSION: ICD-10-CM

## 2020-06-01 PROCEDURE — 99213 OFFICE O/P EST LOW 20 MIN: CPT | Performed by: NURSE PRACTITIONER

## 2020-06-01 ASSESSMENT — MIFFLIN-ST. JEOR: SCORE: 1787.87

## 2020-06-01 ASSESSMENT — PAIN SCALES - GENERAL: PAINLEVEL: NO PAIN (0)

## 2020-06-01 NOTE — PATIENT INSTRUCTIONS
Continue taking your Norvasc and checking you blood pressure (random and couple times per week is fine).     Patient Education     Taking Your Blood Pressure  Blood pressure is the force of blood against the artery wall as it moves from the heart through the blood vessels. You can take your own blood pressure reading using a digital monitor. Take your readings the same each time, using the same arm. Take readings as often as your healthcare provider instructs.  About blood pressure monitors  Blood pressure monitors are designed for certain ages and cases. You can find monitors for older adults, for pregnant women, and for children. Make sure the one you choose is the right one for your age and situation.  The American Heart Association recommends an automatic cuff monitor that fits on your upper arm (bicep). The cuff should fit your arm size. A cuff that s too large or too small will not give an accurate reading. Measure around your upper arm to find your size.  Monitors that attach to your finger or wrist are not as accurate as monitors for your upper arm.  Ask your healthcare provider for help in choosing a monitor. Bring your monitor to your next provider visit if you need help in using it the correct way.  The steps below are general instructions for using an automatic digital monitor.  Step 1. Relax      Take your blood pressure at the same time every day, such as in the morning or evening, or at the time your healthcare provider recommends.    Wait at least a half-hour after smoking, eating, or exercising. Don't drink coffee, tea, soda, or other caffeinated beverages before checking your blood pressure.    Sit comfortably at a table with both feet on the floor. Do not cross your legs or feet. Place the monitor near you.    Rest for a few minutes before you begin.  Step 2. Wrap the cuff      Place your arm on the table, palm up. Your arm should be at the level of your heart. Wrap the cuff around your upper arm,  just above your elbow. It s best done on bare skin, not over clothing. Most cuffs will indicate where the brachial artery (the blood vessel in the middle of the arm at the inner side of the elbow) should line up with the cuff. Look in your monitor's instruction booklet for an illustration. You can also bring your cuff to your healthcare provider and have them show you how to correctly place the cuff.  Step 3. Inflate the cuff      Push the button that starts the pump.    The cuff will tighten, then loosen.    The numbers will change. When they stop changing, your blood pressure reading will appear.    Take 2 or 3 readings one minute apart.  Step 4. Write down the results of each reading      Write down your blood pressure numbers for each reading. Note the date and time. Keep your results in one place, such as a notebook. Even if your monitor has a built-in memory, keep a hard copy of the readings.    Remove the cuff from your arm. Turn off the machine.    Bring your blood pressure records with your healthcare providers at each visit.    If you start a new blood pressure medicine, note the day you started the new medicine. Also note the day if you change the dose of your medicine. This information goes on your blood pressure recording sheet. This will help your healthcare provider monitor how well the medicine changes are working.    Ask your healthcare provider what numbers should prompt you to call him or her. Also ask what numbers should prompt you to get help right away.  Date Last Reviewed: 11/1/2016 2000-2019 [employer]. [address] [place] [address], [place] [address]. All rights reserved. This information is not intended as a substitute for professional medical care. Always follow your healthcare professional's instructions.

## 2020-06-01 NOTE — PROGRESS NOTES
Subjective     Bird Cedeno is a 59 year old male who presents to clinic today for the following health issues:    HPI   Hypertension Follow-up    Do you check your blood pressure regularly outside of the clinic? Yes     Are you following a low salt diet? Yes    Are your blood pressures ever more than 140 on the top number (systolic) OR more   than 90 on the bottom number (diastolic), for example 140/90? Yes: With 130's/90's in AM and 150-160/90's in PM.      How many servings of fruits and vegetables do you eat daily?  0-1    On average, how many sweetened beverages do you drink each day (Examples: soda, juice, sweet tea, etc.  Do NOT count diet or artificially sweetened beverages)?   2    How many days per week do you exercise enough to make your heart beat faster? 4    How many minutes a day do you exercise enough to make your heart beat faster? 30 - 60    How many days per week do you miss taking your medication? 0    Drinks coffee and beer. Does not drink energy drinks. Has cut back on sodium but unsure of just how much he needs to cut back as he is wants to enjoy the summer also. We discussed that moderation is key and low salt is not the same as no salt. If interested in the future, referral to registered dietitian is always an option. Not interested at this time.       Patient Active Problem List   Diagnosis     Benign non-nodular prostatic hyperplasia with lower urinary tract symptoms     Benign essential hypertension     Past Surgical History:   Procedure Laterality Date     APPENDECTOMY       GENITOURINARY SURGERY      circ     SOFT TISSUE SURGERY      wisdom teeth extracted       Social History     Tobacco Use     Smoking status: Never Smoker     Smokeless tobacco: Never Used   Substance Use Topics     Alcohol use: Yes     Family History   Problem Relation Age of Onset     Other Cancer Father         brain tumor- at 52     Diabetes No family hx of      Thyroid Disease No family hx of      Asthma No  family hx of          Current Outpatient Medications   Medication Sig Dispense Refill     amLODIPine (NORVASC) 5 MG tablet Take 1 tablet (5 mg) by mouth daily 60 tablet 1     garlic 150 MG TABS tablet Take 150 mg by mouth daily Patient states his bottle states 1000mg       NAPROXEN PO Take by mouth daily       tamsulosin (FLOMAX) 0.4 MG 24 hr capsule Take 0.4 mg by mouth daily       No Known Allergies  Recent Labs   Lab Test 05/08/20  1541   ALT 40   CR 1.15   GFRESTIMATED 69   GFRESTBLACK 80   POTASSIUM 3.9      BP Readings from Last 3 Encounters:   06/01/20 126/76   05/11/20 (!) 142/94   05/08/20 (!) 174/115    Wt Readings from Last 3 Encounters:   06/01/20 93 kg (205 lb)   05/11/20 92.5 kg (204 lb)   05/08/20 92.5 kg (204 lb)            Reviewed and updated as needed this visit by Provider         Review of Systems   Constitutional, HEENT, cardiovascular, pulmonary, gi and gu systems are negative, except as otherwise noted.      Objective    /76 (BP Location: Right arm, Patient Position: Sitting, Cuff Size: Adult Regular)   Pulse 88   Temp 98.3  F (36.8  C) (Tympanic)   Resp 16   Ht 1.829 m (6')   Wt 93 kg (205 lb)   SpO2 97%   BMI 27.80 kg/m    Body mass index is 27.8 kg/m .     Physical Exam   GENERAL: healthy, alert and no distress  NECK: no adenopathy, no asymmetry, masses, or scars and thyroid normal to palpation  RESP: lungs clear to auscultation - no rales, rhonchi or wheezes  CV: regular rate and rhythm, normal S1 S2, no S3 or S4, no murmur, click or rub, no peripheral edema and peripheral pulses strong  NEURO: Normal strength and tone, mentation intact and speech normal  PSYCH: mentation appears normal, affect normal/bright        Assessment & Plan   1. Benign essential hypertension  Current BP is within target range. However, as his BP cuff at home is reading higher, I have recommended he continue to check his BP a few times a week and bring the cuff in at his next visit.     Continue:  Amlodipine (NORVASC) 5 mg tablet  Check BP a few times per week at home with own monitor.   Low salt diet  Frequent moderate exercise.      BMI:   Estimated body mass index is 27.8 kg/m  as calculated from the following:    Height as of this encounter: 1.829 m (6').    Weight as of this encounter: 93 kg (205 lb).   Weight management plan: Discussed healthy diet and exercise guidelines      Return in about 2 months (around 8/1/2020).    Solange Pretty APRN, CNP  [facility]

## 2020-06-01 NOTE — NURSING NOTE
Chief Complaint   Patient presents with     Hypertension       Initial /76 (BP Location: Right arm, Patient Position: Sitting, Cuff Size: Adult Regular)   Pulse 88   Temp 98.3  F (36.8  C) (Tympanic)   Resp 16   Ht 1.829 m (6')   Wt 93 kg (205 lb)   SpO2 97%   BMI 27.80 kg/m   Estimated body mass index is 27.8 kg/m  as calculated from the following:    Height as of this encounter: 1.829 m (6').    Weight as of this encounter: 93 kg (205 lb).  Medication Reconciliation: complete  Rahel May MA

## 2020-06-03 PROBLEM — I10 BENIGN ESSENTIAL HYPERTENSION: Status: ACTIVE | Noted: 2020-06-03

## 2020-06-03 NOTE — ASSESSMENT & PLAN NOTE
Continue:  Amlodipine (NORVASC) 5 mg tablet  Check BP a few times per week at home with own monitor.   Low salt diet  Frequent moderate exercise.

## 2020-07-09 DIAGNOSIS — I10 BENIGN ESSENTIAL HYPERTENSION: ICD-10-CM

## 2020-07-14 RX ORDER — AMLODIPINE BESYLATE 5 MG/1
TABLET ORAL
Qty: 90 TABLET | Refills: 0 | Status: SHIPPED | OUTPATIENT
Start: 2020-07-14 | End: 2020-11-23

## 2020-07-14 NOTE — TELEPHONE ENCOUNTER
BP Readings from Last 3 Encounters:   06/01/20 126/76   05/11/20 (!) 142/94   05/08/20 (!) 174/115     Pt of Mo.    Pt would like #90 day supply.Pended.      Irene Luz RN

## 2020-07-16 NOTE — PROGRESS NOTES
Subjective     Bird Cedeno is a 59 year old male who presents to clinic today for the following health issues:    HPI       Hypertension Follow-up      Do you check your blood pressure regularly outside of the clinic? Yes     Are you following a low salt diet? No    Are your blood pressures ever more than 140 on the top number (systolic) OR more   than 90 on the bottom number (diastolic), for example 140/90? Yes      How many servings of fruits and vegetables do you eat daily?  2-3    On average, how many sweetened beverages do you drink each day (Examples: soda, juice, sweet tea, etc.  Do NOT count diet or artificially sweetened beverages)?   4    How many days per week do you exercise enough to make your heart beat faster? 4    How many minutes a day do you exercise enough to make your heart beat faster? 30 - 60    How many days per week do you miss taking your medication? 0    Number of at home readings:   Average BP at home: approximately 155/90   Last visit discussed referral to registered dietitian: Not at this time.    BP with home cuff reading today in clinic: Rechecked by myself - Clinic cuff 140/80 and home wrist cuff 157/96.      Patient Active Problem List   Diagnosis     Benign non-nodular prostatic hyperplasia with lower urinary tract symptoms     Benign essential hypertension     Past Surgical History:   Procedure Laterality Date     APPENDECTOMY       GENITOURINARY SURGERY      circ     SOFT TISSUE SURGERY      wisdom teeth extracted       Social History     Tobacco Use     Smoking status: Never Smoker     Smokeless tobacco: Never Used   Substance Use Topics     Alcohol use: Yes     Family History   Problem Relation Age of Onset     Other Cancer Father         brain tumor- at 52     Diabetes No family hx of      Thyroid Disease No family hx of      Asthma No family hx of          Current Outpatient Medications   Medication Sig Dispense Refill     amLODIPine (NORVASC) 5 MG tablet TAKE 1 TABLET(5  MG) BY MOUTH DAILY 90 tablet 0     garlic 150 MG TABS tablet Take 150 mg by mouth daily Patient states his bottle states 1000mg       NAPROXEN PO Take by mouth daily       tamsulosin (FLOMAX) 0.4 MG 24 hr capsule Take 0.4 mg by mouth daily       No Known Allergies  Recent Labs   Lab Test 05/08/20  1541   ALT 40   CR 1.15   GFRESTIMATED 69   GFRESTBLACK 80   POTASSIUM 3.9      BP Readings from Last 3 Encounters:   07/20/20 (!) 142/80   06/01/20 126/76   05/11/20 (!) 142/94    Wt Readings from Last 3 Encounters:   07/20/20 92.1 kg (203 lb)   06/01/20 93 kg (205 lb)   05/11/20 92.5 kg (204 lb)            Reviewed and updated as needed this visit by Provider         Review of Systems   Constitutional, HEENT, cardiovascular, pulmonary, gi and gu systems are negative, except as otherwise noted.      Objective    BP (!) 142/80 (BP Location: Right arm, Patient Position: Chair, Cuff Size: Adult Regular)   Pulse 98   Temp 98.6  F (37  C) (Tympanic)   Ht 1.829 m (6')   Wt 92.1 kg (203 lb)   SpO2 97%   BMI 27.53 kg/m    Body mass index is 27.53 kg/m .     Physical Exam   GENERAL: healthy, alert and no distress  EYES: Eyes grossly normal to inspection, PERRL and conjunctivae and sclerae normal  NECK: no adenopathy, no asymmetry, masses, or scars and thyroid normal to palpation  RESP: lungs clear to auscultation - no rales, rhonchi or wheezes  CV: regular rate and rhythm, normal S1 S2, no S3 or S4, no murmur, click or rub, no peripheral edema and peripheral pulses strong  PSYCH: mentation appears normal, affect normal/bright     Assessment & Plan    1. Benign essential hypertension  Continue current plan of care.   Next visit 6 months.      No follow-ups on file.    Solange Pretty, CNP  [facility]

## 2020-07-20 ENCOUNTER — OFFICE VISIT (OUTPATIENT)
Dept: FAMILY MEDICINE | Facility: OTHER | Age: 60
End: 2020-07-20
Attending: NURSE PRACTITIONER
Payer: COMMERCIAL

## 2020-07-20 VITALS
HEIGHT: 72 IN | SYSTOLIC BLOOD PRESSURE: 142 MMHG | OXYGEN SATURATION: 97 % | DIASTOLIC BLOOD PRESSURE: 80 MMHG | WEIGHT: 203 LBS | TEMPERATURE: 98.6 F | BODY MASS INDEX: 27.5 KG/M2 | HEART RATE: 98 BPM

## 2020-07-20 DIAGNOSIS — I10 BENIGN ESSENTIAL HYPERTENSION: Primary | ICD-10-CM

## 2020-07-20 PROCEDURE — 99213 OFFICE O/P EST LOW 20 MIN: CPT | Performed by: NURSE PRACTITIONER

## 2020-07-20 ASSESSMENT — MIFFLIN-ST. JEOR: SCORE: 1778.8

## 2020-07-20 ASSESSMENT — PAIN SCALES - GENERAL: PAINLEVEL: NO PAIN (0)

## 2020-07-20 NOTE — NURSING NOTE
Chief Complaint   Patient presents with     UC Follow-Up     Hypertension       Initial BP (!) 142/80 (BP Location: Right arm, Patient Position: Chair, Cuff Size: Adult Regular)   Pulse 98   Temp 98.6  F (37  C) (Tympanic)   Ht 1.829 m (6')   Wt 92.1 kg (203 lb)   SpO2 97%   BMI 27.53 kg/m   Estimated body mass index is 27.53 kg/m  as calculated from the following:    Height as of this encounter: 1.829 m (6').    Weight as of this encounter: 92.1 kg (203 lb).  Medication Reconciliation: complete  Mercedes Correa LPN

## 2020-11-23 ENCOUNTER — OFFICE VISIT (OUTPATIENT)
Dept: FAMILY MEDICINE | Facility: OTHER | Age: 60
End: 2020-11-23
Attending: NURSE PRACTITIONER
Payer: COMMERCIAL

## 2020-11-23 VITALS
WEIGHT: 193.3 LBS | SYSTOLIC BLOOD PRESSURE: 146 MMHG | HEIGHT: 72 IN | BODY MASS INDEX: 26.18 KG/M2 | HEART RATE: 125 BPM | DIASTOLIC BLOOD PRESSURE: 94 MMHG | OXYGEN SATURATION: 95 % | TEMPERATURE: 97.8 F

## 2020-11-23 DIAGNOSIS — I10 BENIGN ESSENTIAL HYPERTENSION: Primary | ICD-10-CM

## 2020-11-23 LAB
ALBUMIN SERPL-MCNC: 4 G/DL (ref 3.4–5)
ALP SERPL-CCNC: 57 U/L (ref 40–150)
ALT SERPL W P-5'-P-CCNC: 46 U/L (ref 0–70)
ANION GAP SERPL CALCULATED.3IONS-SCNC: 9 MMOL/L (ref 3–14)
AST SERPL W P-5'-P-CCNC: 16 U/L (ref 0–45)
BILIRUB SERPL-MCNC: 0.4 MG/DL (ref 0.2–1.3)
BUN SERPL-MCNC: 19 MG/DL (ref 7–30)
CALCIUM SERPL-MCNC: 9.5 MG/DL (ref 8.5–10.1)
CHLORIDE SERPL-SCNC: 105 MMOL/L (ref 94–109)
CHOLEST SERPL-MCNC: 180 MG/DL
CO2 SERPL-SCNC: 24 MMOL/L (ref 20–32)
CREAT SERPL-MCNC: 0.94 MG/DL (ref 0.66–1.25)
GFR SERPL CREATININE-BSD FRML MDRD: 88 ML/MIN/{1.73_M2}
GLUCOSE SERPL-MCNC: 118 MG/DL (ref 70–99)
HDLC SERPL-MCNC: 46 MG/DL
LDLC SERPL CALC-MCNC: 103 MG/DL
NONHDLC SERPL-MCNC: 134 MG/DL
POTASSIUM SERPL-SCNC: 3.8 MMOL/L (ref 3.4–5.3)
PROT SERPL-MCNC: 8 G/DL (ref 6.8–8.8)
SODIUM SERPL-SCNC: 138 MMOL/L (ref 133–144)
TRIGL SERPL-MCNC: 153 MG/DL
TSH SERPL DL<=0.005 MIU/L-ACNC: 1.68 MU/L (ref 0.4–4)

## 2020-11-23 PROCEDURE — 84443 ASSAY THYROID STIM HORMONE: CPT | Performed by: NURSE PRACTITIONER

## 2020-11-23 PROCEDURE — 93000 ELECTROCARDIOGRAM COMPLETE: CPT | Performed by: INTERNAL MEDICINE

## 2020-11-23 PROCEDURE — 80061 LIPID PANEL: CPT | Performed by: NURSE PRACTITIONER

## 2020-11-23 PROCEDURE — 36415 COLL VENOUS BLD VENIPUNCTURE: CPT | Performed by: NURSE PRACTITIONER

## 2020-11-23 PROCEDURE — 99214 OFFICE O/P EST MOD 30 MIN: CPT | Mod: 25 | Performed by: NURSE PRACTITIONER

## 2020-11-23 PROCEDURE — 80053 COMPREHEN METABOLIC PANEL: CPT | Performed by: NURSE PRACTITIONER

## 2020-11-23 RX ORDER — METOPROLOL SUCCINATE 50 MG/1
50 TABLET, EXTENDED RELEASE ORAL DAILY
Qty: 90 TABLET | Refills: 1 | Status: SHIPPED | OUTPATIENT
Start: 2020-11-23 | End: 2020-12-08

## 2020-11-23 RX ORDER — AMLODIPINE BESYLATE 5 MG/1
TABLET ORAL
Qty: 90 TABLET | Refills: 1 | Status: SHIPPED | OUTPATIENT
Start: 2020-11-23 | End: 2020-12-08

## 2020-11-23 ASSESSMENT — PAIN SCALES - GENERAL: PAINLEVEL: NO PAIN (0)

## 2020-11-23 ASSESSMENT — MIFFLIN-ST. JEOR: SCORE: 1729.8

## 2020-11-23 NOTE — PATIENT INSTRUCTIONS
Assessment & Plan     Benign essential hypertension  - TSH with free T4 reflex  - Comprehensive metabolic panel  - EKG 12-lead complete w/read - (Clinic Performed)  - Lipid Profile (Chol, Trig, HDL, LDL calc)  - metoprolol succinate ER (TOPROL-XL) 50 MG 24 hr tablet; Take 1 tablet (50 mg) by mouth daily  - amLODIPine (NORVASC) 5 MG tablet; TAKE 1 TABLET(5 MG) BY MOUTH DAILY  - aspirin (ASA) 81 MG EC tablet; Take 1 tablet (81 mg) by mouth daily        Your goal BP is  120's/70's    To ER as needed    Return 2 weeks - last appt of the day - FU on.      Sylwia Arias, DARIAN  [facility] [place] [facility]

## 2020-11-23 NOTE — NURSING NOTE
Chief Complaint   Patient presents with     Hypertension       Initial BP (!) 154/94   Pulse 125   Temp 97.8  F (36.6  C) (Tympanic)   Ht 1.829 m (6')   Wt 87.7 kg (193 lb 4.8 oz)   SpO2 95%   BMI 26.22 kg/m   Estimated body mass index is 26.22 kg/m  as calculated from the following:    Height as of this encounter: 1.829 m (6').    Weight as of this encounter: 87.7 kg (193 lb 4.8 oz).  Medication Reconciliation: complete  Andreia Moe LPN

## 2020-11-23 NOTE — RESULT ENCOUNTER NOTE
High risk score  Pls see if he will start Lipitor 20 mg daily  Fish oil 3 per day      The 10-year ASCVD risk score (Gopal BO Jr., et al., 2013) is: 11.1%    Values used to calculate the score:      Age: 60 years      Sex: Male      Is Non- : No      Diabetic: No      Tobacco smoker: No      Systolic Blood Pressure: 146 mmHg      Is BP treated: Yes      HDL Cholesterol: 46 mg/dL      Total Cholesterol: 180 mg/dL

## 2020-11-23 NOTE — PROGRESS NOTES
Bird Cedeno is a 60 year old male who presents to clinic today for the following health issues:          Hypertension Follow-up    Do you check your blood pressure regularly outside of the clinic? Yes     Are you following a low salt diet? Yes    Are your blood pressures ever more than 140 on the top number (systolic) OR more   than 90 on the bottom number (diastolic), for example 140/90? Yes       He has been experiencing headaches, he feels due to his blood pressure elevation  He denies chest pain, SOB, edema      He feels his heart rate is faster over the past two weeks        How many servings of fruits and vegetables do you eat daily?  0-1    On average, how many sweetened beverages do you drink each day (Examples: soda, juice, sweet tea, etc.  Do NOT count diet or artificially sweetened beverages)?   2    How many days per week do you exercise enough to make your heart beat faster? 4    How many minutes a day do you exercise enough to make your heart beat faster? 20 - 29    How many days per week do you miss taking your medication? 0      Patient Active Problem List   Diagnosis     Benign non-nodular prostatic hyperplasia with lower urinary tract symptoms     Benign essential hypertension     Past Surgical History:   Procedure Laterality Date     APPENDECTOMY       GENITOURINARY SURGERY      circ     SOFT TISSUE SURGERY      wisdom teeth extracted       Social History     Tobacco Use     Smoking status: Never Smoker     Smokeless tobacco: Never Used   Substance Use Topics     Alcohol use: Yes     Family History   Problem Relation Age of Onset     Other Cancer Father         brain tumor- at 52     Diabetes No family hx of      Thyroid Disease No family hx of      Asthma No family hx of            Current Outpatient Medications   Medication Sig Dispense Refill     amLODIPine (NORVASC) 5 MG tablet TAKE 1 TABLET(5 MG) BY MOUTH DAILY 90 tablet 1     aspirin (ASA) 81 MG EC tablet Take 1 tablet (81 mg) by  mouth daily 90 tablet 3     garlic 150 MG TABS tablet Take 150 mg by mouth daily Patient states his bottle states 1000mg       metoprolol succinate ER (TOPROL-XL) 50 MG 24 hr tablet Take 1 tablet (50 mg) by mouth daily 90 tablet 1     NAPROXEN PO Take by mouth daily       tamsulosin (FLOMAX) 0.4 MG 24 hr capsule Take 0.4 mg by mouth daily           No Known Allergies       Recent Labs   Lab Test 05/08/20  1541   ALT 40   CR 1.15   GFRESTIMATED 69   GFRESTBLACK 80   POTASSIUM 3.9          BP Readings from Last 3 Encounters:   11/23/20 (!) 146/94   07/20/20 (!) 142/80   06/01/20 126/76    Wt Readings from Last 3 Encounters:   11/23/20 87.7 kg (193 lb 4.8 oz)   07/20/20 92.1 kg (203 lb)   06/01/20 93 kg (205 lb)              Review of Systems   Constitutional, HEENT, cardiovascular, pulmonary, GI, , musculoskeletal, neuro, skin, endocrine and psych systems are negative, except as otherwise noted.          Objective    BP (!) 146/94   Pulse 125   Temp 97.8  F (36.6  C) (Tympanic)   Ht 1.829 m (6')   Wt 87.7 kg (193 lb 4.8 oz)   SpO2 95%   BMI 26.22 kg/m    Body mass index is 26.22 kg/m .         Physical Exam   GENERAL: healthy, alert and no distress  GENERAL: healthy, alert and no distress  HENT: ear canals and TM's normal, nose and mouth without ulcers or lesions  NECK: no adenopathy, no asymmetry, masses, or scars and thyroid normal to palpation  RESP: lungs clear to auscultation - no rales, rhonchi or wheezes  CV: regular rate and rhythm, normal S1 S2, no S3 or S4, no murmur, click or rub, no peripheral edema and peripheral pulses strong  MS: no gross musculoskeletal defects noted, no edema  SKIN: no suspicious lesions or rashes  PSYCH: mentation appears normal, affect normal/bright      EKG - sinus tachycardia          Assessment & Plan     Benign essential hypertension  - TSH with free T4 reflex  - Comprehensive metabolic panel  - EKG 12-lead complete w/read - (Clinic Performed)  - Lipid Profile (Chol,  Trig, HDL, LDL calc)  - metoprolol succinate ER (TOPROL-XL) 50 MG 24 hr tablet; Take 1 tablet (50 mg) by mouth daily  - amLODIPine (NORVASC) 5 MG tablet; TAKE 1 TABLET(5 MG) BY MOUTH DAILY  - aspirin (ASA) 81 MG EC tablet; Take 1 tablet (81 mg) by mouth daily         Your goal BP is  120's/70's    To ER as needed      Return 2 weeks - last appt of the day - FU on.      Sylwia Arias CNP  [facility] [place] [facility]

## 2020-11-24 DIAGNOSIS — E78.5 HYPERLIPIDEMIA LDL GOAL <100: ICD-10-CM

## 2020-11-24 DIAGNOSIS — E78.5 HYPERLIPIDEMIA LDL GOAL <100: Primary | ICD-10-CM

## 2020-11-24 RX ORDER — ATORVASTATIN CALCIUM 20 MG/1
20 TABLET, FILM COATED ORAL DAILY
Qty: 30 TABLET | Refills: 0 | Status: SHIPPED | OUTPATIENT
Start: 2020-11-24 | End: 2020-11-25

## 2020-11-24 NOTE — TELEPHONE ENCOUNTER
Lipitor 20 MG pended per provider recommendations from recent labs. Please associate diagnosis and sign if appropriate.

## 2020-11-25 RX ORDER — ATORVASTATIN CALCIUM 20 MG/1
TABLET, FILM COATED ORAL
Qty: 90 TABLET | Refills: 1 | Status: SHIPPED | OUTPATIENT
Start: 2020-11-25 | End: 2020-12-16

## 2020-11-26 ENCOUNTER — TRANSFERRED RECORDS (OUTPATIENT)
Dept: HEALTH INFORMATION MANAGEMENT | Facility: CLINIC | Age: 60
End: 2020-11-26

## 2020-11-27 DIAGNOSIS — H91.93 DECREASED HEARING OF BOTH EARS: Primary | ICD-10-CM

## 2020-12-02 ENCOUNTER — OFFICE VISIT (OUTPATIENT)
Dept: AUDIOLOGY | Facility: OTHER | Age: 60
End: 2020-12-02
Attending: AUDIOLOGIST
Payer: COMMERCIAL

## 2020-12-02 ENCOUNTER — OFFICE VISIT (OUTPATIENT)
Dept: OTOLARYNGOLOGY | Facility: OTHER | Age: 60
End: 2020-12-02
Attending: AUDIOLOGIST
Payer: COMMERCIAL

## 2020-12-02 VITALS
SYSTOLIC BLOOD PRESSURE: 120 MMHG | BODY MASS INDEX: 25.36 KG/M2 | OXYGEN SATURATION: 96 % | DIASTOLIC BLOOD PRESSURE: 84 MMHG | TEMPERATURE: 98 F | WEIGHT: 187 LBS | HEART RATE: 79 BPM

## 2020-12-02 DIAGNOSIS — H90.3 SENSORINEURAL HEARING LOSS, ASYMMETRICAL: Primary | ICD-10-CM

## 2020-12-02 DIAGNOSIS — R09.81 NASAL CONGESTION: ICD-10-CM

## 2020-12-02 DIAGNOSIS — R09.81 NASAL CONGESTION: Primary | ICD-10-CM

## 2020-12-02 DIAGNOSIS — H91.93 DECREASED HEARING OF BOTH EARS: ICD-10-CM

## 2020-12-02 DIAGNOSIS — H93.8X3 PLUGGED FEELING IN EAR, BILATERAL: ICD-10-CM

## 2020-12-02 PROCEDURE — 99213 OFFICE O/P EST LOW 20 MIN: CPT | Mod: 25 | Performed by: NURSE PRACTITIONER

## 2020-12-02 PROCEDURE — 92504 EAR MICROSCOPY EXAMINATION: CPT | Performed by: NURSE PRACTITIONER

## 2020-12-02 PROCEDURE — 92504 EAR MICROSCOPY EXAMINATION: CPT

## 2020-12-02 PROCEDURE — G0463 HOSPITAL OUTPT CLINIC VISIT: HCPCS | Mod: 25

## 2020-12-02 PROCEDURE — 31231 NASAL ENDOSCOPY DX: CPT | Performed by: NURSE PRACTITIONER

## 2020-12-02 PROCEDURE — 31231 NASAL ENDOSCOPY DX: CPT

## 2020-12-02 PROCEDURE — 92557 COMPREHENSIVE HEARING TEST: CPT | Performed by: AUDIOLOGIST

## 2020-12-02 PROCEDURE — 92550 TYMPANOMETRY & REFLEX THRESH: CPT | Performed by: AUDIOLOGIST

## 2020-12-02 RX ORDER — FLUTICASONE PROPIONATE 50 MCG
2 SPRAY, SUSPENSION (ML) NASAL DAILY
Qty: 16 G | Refills: 1 | Status: SHIPPED | OUTPATIENT
Start: 2020-12-02 | End: 2021-12-28

## 2020-12-02 RX ORDER — FLUTICASONE PROPIONATE 50 MCG
SPRAY, SUSPENSION (ML) NASAL
Qty: 48 G | OUTPATIENT
Start: 2020-12-02

## 2020-12-02 RX ORDER — OMEGA-3/DHA/EPA/FISH OIL 60 MG-90MG
CAPSULE ORAL
COMMUNITY
End: 2021-12-28

## 2020-12-02 ASSESSMENT — PAIN SCALES - GENERAL: PAINLEVEL: NO PAIN (0)

## 2020-12-02 NOTE — NURSING NOTE
Chief Complaint   Patient presents with     Ear Problem     plugged ears       Initial /84   Pulse 79   Temp 98  F (36.7  C) (Tympanic)   Wt 84.8 kg (187 lb)   SpO2 96%   BMI 25.36 kg/m   Estimated body mass index is 25.36 kg/m  as calculated from the following:    Height as of 11/23/20: 1.829 m (6').    Weight as of this encounter: 84.8 kg (187 lb).  Medication Reconciliation: complete  Lou Loera LPN

## 2020-12-02 NOTE — PROGRESS NOTES
Otolaryngology Note         Chief Complaint:     Patient presents with:  Ear Problem: plugged ears           History of Present Illness:     Bird Cedeno is a 60 year old male seen today for plugged ear sensation for the past 2 weeks.  He tried some over the counter wax softening drops.      No history of wax   He feels his hearing is muffled  No history of recurrent OM  He is not a smoker   Patient has no history of otological surgeries or procedures  He had a cold preceding the plugged sensation   No current concerns with otalgia, otorrhea.    No history of noise exposure  No vertigo, facial numbness or tingling.      Audiogram completed 12/2/2020:    Tympanograms are Type A for both ears suggesting normal eardrum mobility.  Acoustic Reflex Thresholds at 1000 Hz are present for both ears.  Thresholds are normal sloping to slight loss left and moderate asymmetrical sensorineural hearing loss. Inserts and TDH used.  Asymmetry is noted in 6k and 8k hz.    Speech reception thresholds are in good agreement with pure tone average.  Word discrimination scores are excellent at supra-thresholds level.         Medications:     Current Outpatient Rx   Medication Sig Dispense Refill     amLODIPine (NORVASC) 5 MG tablet TAKE 1 TABLET(5 MG) BY MOUTH DAILY 90 tablet 1     atorvastatin (LIPITOR) 20 MG tablet TAKE 1 TABLET(20 MG) BY MOUTH DAILY 90 tablet 1     fluticasone (FLONASE) 50 MCG/ACT nasal spray Spray 2 sprays into both nostrils daily 16 g 1     garlic 150 MG TABS tablet Take 150 mg by mouth daily Patient states his bottle states 1000mg       metoprolol succinate ER (TOPROL-XL) 50 MG 24 hr tablet Take 1 tablet (50 mg) by mouth daily 90 tablet 1     Omega-3 Fatty Acids (FISH OIL) 500 MG CAPS        tamsulosin (FLOMAX) 0.4 MG 24 hr capsule Take 0.4 mg by mouth daily              Allergies:     Allergies: Patient has no known allergies.          Past Medical History:     History reviewed. No pertinent past medical  history.         Past Surgical History:     Past Surgical History:   Procedure Laterality Date     APPENDECTOMY       GENITOURINARY SURGERY      circ     SOFT TISSUE SURGERY      wisdom teeth extracted       ENT family history reviewed         Social History:     Social History     Tobacco Use     Smoking status: Never Smoker     Smokeless tobacco: Never Used   Substance Use Topics     Alcohol use: Yes     Drug use: No            Review of Systems:     ROS: See HPI         Physical Exam:     /84   Pulse 79   Temp 98  F (36.7  C) (Tympanic)   Wt 84.8 kg (187 lb)   SpO2 96%   BMI 25.36 kg/m      General - The patient is well nourished and well developed, and appears to have good nutritional status.  Alert and oriented to person and place, answers questions and cooperates with examination appropriately.   Head and Face - Normocephalic and atraumatic, with no gross asymmetry noted.  The facial nerve is intact, with strong symmetric movements.  Voice and Breathing - The patient was breathing comfortably without the use of accessory muscles. There was no wheezing, stridor. The patients voice was clear and strong, and had appropriate pitch and quality.  Ears - The ears were examined under binocular microscopy and with otoscope.  External ear normal. Canals are patent. Right tympanic membrane is intact without effusion, retraction or mass. Left tympanic membrane is intact without effusion, retraction or mass.  Good movement with valsalva, he reports decrease in plugged sensation after valsalva.  Eyes - Extraocular movements intact, sclera were not icteric or injected, conjunctiva were pink and moist.  Mouth - Examination of the oral cavity showed pink, healthy oral mucosa. Dentition in good condition. No lesions or ulcerations noted. The tongue was mobile and midline.   Throat - The walls of the oropharynx were smooth, pink, moist, symmetric, and had no lesions or ulcerations.  The tonsillar pillars and soft  palate were symmetric. The uvula was midline on elevation.    Neck - Normal midline excursion of the laryngotracheal complex during swallowing.  Full range of motion on passive movement.  Palpation of the occipital, submental, submandibular, internal jugular chain, and supraclavicular nodes did not demonstrate any abnormal lymph nodes or masses.  Palpation of the thyroid was soft and smooth, with no nodules or goiter appreciated.  The trachea was mobile and midline.  Nose - External contour is symmetric, no gross deflection or scars.  Nasal mucosa is pink and moist with no abnormal mucus.      To evaluate the nose and sinuses, I performed rigid nasal endoscopy.  I sprayed both nares with 2 sprays lidocaine and neosynephrine.     I began with the RIGHT side using a 0 degree rigid nasal endoscope, and then similarly examined the LEFT side     Findings: septum is intact  Inferior turbinates:  without purulence or polyps, mildly enlarged  Middle turbinate and middle meatus:  No purulence, no polyposis  Mucosa is healthy throughout,  no polyps nor polypoid degeneration  The sphenoethmoidal recess is without purulence  Nasopharynx clear, ET patent, no edema  The patient tolerated the procedure well            Assessment and Plan:       ICD-10-CM    1. Nasal congestion  R09.81 fluticasone (FLONASE) 50 MCG/ACT nasal spray   2. Plugged feeling in ear, bilateral  H93.8X3 fluticasone (FLONASE) 50 MCG/ACT nasal spray     Start flonase 2 spray daily  Valsalva (plug nose and mouth and blow air into your ear) 2-3 times per day    Follow up in 6 months for repeat audiogram  Call if symptoms do not improve in 2-3 weeks    I  discussed the indication for MRI of the Brain and Internal Auditory Canals.  The possibility of acoustic neuroma causing asymmetrical nerve hearing loss was discussed.  The patient was told acoustic neuromas are very rare, benign, and generally treated by observation only.  He declines MRI IAC at this time.  He  will follow up for repeat Audio in 6 months.     Ronda XIONG  [facility]        Scope # [id number]

## 2020-12-02 NOTE — PROGRESS NOTES
Audiology Evaluation Completed. Please refer SCANNED AUDIOGRAM and/or TYMPANOGRAM for BACKGROUND, RESULTS, RECOMMENDATIONS.      Юлия APPIAH, [facility]  Audiologist #[id number]

## 2020-12-02 NOTE — LETTER
12/2/2020         RE: Bird Cedeno  [address] [place] [address]  [place] [address]        Dear Colleague,    Thank you for referring your patient, Bird Cedeno, to the [facility]. Please see a copy of my visit note below.    Otolaryngology Note         Chief Complaint:     Patient presents with:  Ear Problem: plugged ears           History of Present Illness:     Bird Cedeno is a 60 year old male seen today for plugged ear sensation for the past 2 weeks.  He tried some over the counter wax softening drops.      No history of wax   He feels his hearing is muffled  No history of recurrent OM  He is not a smoker   Patient has no history of otological surgeries or procedures  He had a cold preceding the plugged sensation   No current concerns with otalgia, otorrhea.    No history of noise exposure  No vertigo, facial numbness or tingling.      Audiogram completed 12/2/2020:    Tympanograms are Type A for both ears suggesting normal eardrum mobility.  Acoustic Reflex Thresholds at 1000 Hz are present for both ears.  Thresholds are normal sloping to slight loss left and moderate asymmetrical sensorineural hearing loss. Inserts and TDH used.  Asymmetry is noted in 6k and 8k hz.    Speech reception thresholds are in good agreement with pure tone average.  Word discrimination scores are excellent at supra-thresholds level.         Medications:     Current Outpatient Rx   Medication Sig Dispense Refill     amLODIPine (NORVASC) 5 MG tablet TAKE 1 TABLET(5 MG) BY MOUTH DAILY 90 tablet 1     atorvastatin (LIPITOR) 20 MG tablet TAKE 1 TABLET(20 MG) BY MOUTH DAILY 90 tablet 1     fluticasone (FLONASE) 50 MCG/ACT nasal spray Spray 2 sprays into both nostrils daily 16 g 1     garlic 150 MG TABS tablet Take 150 mg by mouth daily Patient states his bottle states 1000mg       metoprolol succinate ER (TOPROL-XL) 50 MG 24 hr tablet Take 1 tablet (50 mg) by mouth daily 90 tablet 1     Omega-3 Fatty Acids (FISH OIL)  500 MG CAPS        tamsulosin (FLOMAX) 0.4 MG 24 hr capsule Take 0.4 mg by mouth daily              Allergies:     Allergies: Patient has no known allergies.          Past Medical History:     History reviewed. No pertinent past medical history.         Past Surgical History:     Past Surgical History:   Procedure Laterality Date     APPENDECTOMY       GENITOURINARY SURGERY      circ     SOFT TISSUE SURGERY      wisdom teeth extracted       ENT family history reviewed         Social History:     Social History     Tobacco Use     Smoking status: Never Smoker     Smokeless tobacco: Never Used   Substance Use Topics     Alcohol use: Yes     Drug use: No            Review of Systems:     ROS: See HPI         Physical Exam:     /84   Pulse 79   Temp 98  F (36.7  C) (Tympanic)   Wt 84.8 kg (187 lb)   SpO2 96%   BMI 25.36 kg/m      General - The patient is well nourished and well developed, and appears to have good nutritional status.  Alert and oriented to person and place, answers questions and cooperates with examination appropriately.   Head and Face - Normocephalic and atraumatic, with no gross asymmetry noted.  The facial nerve is intact, with strong symmetric movements.  Voice and Breathing - The patient was breathing comfortably without the use of accessory muscles. There was no wheezing, stridor. The patients voice was clear and strong, and had appropriate pitch and quality.  Ears - The ears were examined under binocular microscopy and with otoscope.  External ear normal. Canals are patent. Right tympanic membrane is intact without effusion, retraction or mass. Left tympanic membrane is intact without effusion, retraction or mass.  Good movement with valsalva, he reports decrease in plugged sensation after valsalva.  Eyes - Extraocular movements intact, sclera were not icteric or injected, conjunctiva were pink and moist.  Mouth - Examination of the oral cavity showed pink, healthy oral mucosa.  Dentition in good condition. No lesions or ulcerations noted. The tongue was mobile and midline.   Throat - The walls of the oropharynx were smooth, pink, moist, symmetric, and had no lesions or ulcerations.  The tonsillar pillars and soft palate were symmetric. The uvula was midline on elevation.    Neck - Normal midline excursion of the laryngotracheal complex during swallowing.  Full range of motion on passive movement.  Palpation of the occipital, submental, submandibular, internal jugular chain, and supraclavicular nodes did not demonstrate any abnormal lymph nodes or masses.  Palpation of the thyroid was soft and smooth, with no nodules or goiter appreciated.  The trachea was mobile and midline.  Nose - External contour is symmetric, no gross deflection or scars.  Nasal mucosa is pink and moist with no abnormal mucus.      To evaluate the nose and sinuses, I performed rigid nasal endoscopy.  I sprayed both nares with 2 sprays lidocaine and neosynephrine.     I began with the RIGHT side using a 0 degree rigid nasal endoscope, and then similarly examined the LEFT side     Findings: septum is intact  Inferior turbinates:  without purulence or polyps, mildly enlarged  Middle turbinate and middle meatus:  No purulence, no polyposis  Mucosa is healthy throughout,  no polyps nor polypoid degeneration  The sphenoethmoidal recess is without purulence  Nasopharynx clear, ET patent, no edema  The patient tolerated the procedure well            Assessment and Plan:       ICD-10-CM    1. Nasal congestion  R09.81 fluticasone (FLONASE) 50 MCG/ACT nasal spray   2. Plugged feeling in ear, bilateral  H93.8X3 fluticasone (FLONASE) 50 MCG/ACT nasal spray     Start flonase 2 spray daily  Valsalva (plug nose and mouth and blow air into your ear) 2-3 times per day    Follow up in 6 months for repeat audiogram  Call if symptoms do not improve in 2-3 weeks    I  discussed the indication for MRI of the Brain and Internal Auditory  Canals.  The possibility of acoustic neuroma causing asymmetrical nerve hearing loss was discussed.  The patient was told acoustic neuromas are very rare, benign, and generally treated by observation only.  He declines MRI IAC at this time.  He will follow up for repeat Audio in 6 months.     Ronda XIONG  [facility]        Scope # [id number]        Again, thank you for allowing me to participate in the care of your patient.        Sincerely,        Ronda Marroquin NP

## 2020-12-02 NOTE — PATIENT INSTRUCTIONS
Start flonase 2 spray daily  Valsalva (plug nose and mouth and blow air into your ear) 2-3 times per day    Follow up in 6 months for repeat audiogram  Call if symptoms do not improve in 2-3 weeks      Thank you for allowing Ronda XIONG and our ENT team to participate in your care.  If your medications are too expensive, please call my nurse at the number listed below.  We can possibly change this medication.    If you have a scheduling or an appointment question please contact our Health Unit Coordinator at their direct line [phone].   ALL nursing questions or concerns can be directed to your ENT nurses at: [phone] or [phone].

## 2020-12-07 ENCOUNTER — OFFICE VISIT (OUTPATIENT)
Dept: FAMILY MEDICINE | Facility: OTHER | Age: 60
End: 2020-12-07
Attending: NURSE PRACTITIONER
Payer: COMMERCIAL

## 2020-12-07 VITALS
HEIGHT: 72 IN | BODY MASS INDEX: 26.95 KG/M2 | DIASTOLIC BLOOD PRESSURE: 82 MMHG | SYSTOLIC BLOOD PRESSURE: 138 MMHG | WEIGHT: 199 LBS | OXYGEN SATURATION: 98 % | TEMPERATURE: 97.4 F | HEART RATE: 78 BPM

## 2020-12-07 DIAGNOSIS — I10 BENIGN ESSENTIAL HYPERTENSION: Primary | ICD-10-CM

## 2020-12-07 PROBLEM — E78.5 HYPERLIPIDEMIA LDL GOAL <100: Status: ACTIVE | Noted: 2020-12-07

## 2020-12-07 PROBLEM — Z51.81 ENCOUNTER FOR MONITORING STATIN THERAPY: Status: ACTIVE | Noted: 2020-12-07

## 2020-12-07 PROBLEM — Z79.899 ENCOUNTER FOR MONITORING STATIN THERAPY: Status: ACTIVE | Noted: 2020-12-07

## 2020-12-07 PROCEDURE — 99214 OFFICE O/P EST MOD 30 MIN: CPT | Performed by: NURSE PRACTITIONER

## 2020-12-07 ASSESSMENT — PAIN SCALES - GENERAL: PAINLEVEL: NO PAIN (0)

## 2020-12-07 ASSESSMENT — MIFFLIN-ST. JEOR: SCORE: 1755.66

## 2020-12-07 NOTE — Clinical Note
I am referring this pt to you to discuss some am SOB he is experiencing.  I am wondering if this could be med related.

## 2020-12-07 NOTE — NURSING NOTE
Chief Complaint   Patient presents with     Hypertension       Initial /82 (BP Location: Left arm, Patient Position: Chair, Cuff Size: Adult Large)   Pulse 78   Temp 97.4  F (36.3  C) (Tympanic)   Ht 1.829 m (6')   Wt 90.3 kg (199 lb)   SpO2 98%   BMI 26.99 kg/m   Estimated body mass index is 26.99 kg/m  as calculated from the following:    Height as of this encounter: 1.829 m (6').    Weight as of this encounter: 90.3 kg (199 lb).  Medication Reconciliation: complete  Mercedes Correa LPN  R

## 2020-12-07 NOTE — PATIENT INSTRUCTIONS
Assessment & Plan       Benign essential hypertension  - INTERNAL MEDICINE REFERRAL       Sylwia Arias, CNP  [facility] [place] [facility]

## 2020-12-07 NOTE — PROGRESS NOTES
Bird Cedeno is a 60 year old male who presents to clinic today for the following health issues:            Hypertension Follow-up    Do you check your blood pressure regularly outside of the clinic? No     Are you following a low salt diet? Yes    Are your blood pressures ever more than 140 on the top number (systolic) OR more   than 90 on the bottom number (diastolic), for example 140/90? No      Some SOB in the am since starting Toprol        How many servings of fruits and vegetables do you eat daily?  0-1    On average, how many sweetened beverages do you drink each day (Examples: soda, juice, sweet tea, etc.  Do NOT count diet or artificially sweetened beverages)?   2    How many days per week do you exercise enough to make your heart beat faster? 4    How many minutes a day do you exercise enough to make your heart beat faster? 20 - 29    How many days per week do you miss taking your medication? 0        Patient Active Problem List   Diagnosis     Benign non-nodular prostatic hyperplasia with lower urinary tract symptoms     Benign essential hypertension     Hyperlipidemia LDL goal <100     Encounter for monitoring statin therapy     Past Surgical History:   Procedure Laterality Date     APPENDECTOMY       GENITOURINARY SURGERY      circ     SOFT TISSUE SURGERY      wisdom teeth extracted       Social History     Tobacco Use     Smoking status: Never Smoker     Smokeless tobacco: Never Used   Substance Use Topics     Alcohol use: Yes     Family History   Problem Relation Age of Onset     Other Cancer Father         brain tumor- at 52     Diabetes No family hx of      Thyroid Disease No family hx of      Asthma No family hx of            Current Outpatient Medications   Medication Sig Dispense Refill     amLODIPine (NORVASC) 5 MG tablet TAKE 1 TABLET(5 MG) BY MOUTH DAILY 90 tablet 1     atorvastatin (LIPITOR) 20 MG tablet TAKE 1 TABLET(20 MG) BY MOUTH DAILY 90 tablet 1     fluticasone (FLONASE) 50  MCG/ACT nasal spray Spray 2 sprays into both nostrils daily 16 g 1     garlic 150 MG TABS tablet Take 150 mg by mouth daily Patient states his bottle states 1000mg       metoprolol succinate ER (TOPROL-XL) 50 MG 24 hr tablet Take 1 tablet (50 mg) by mouth daily 90 tablet 1     Omega-3 Fatty Acids (FISH OIL) 500 MG CAPS        tamsulosin (FLOMAX) 0.4 MG 24 hr capsule Take 0.4 mg by mouth daily         No Known Allergies       Recent Labs   Lab Test 11/23/20  0852 05/08/20  1541   *  --    HDL 46  --    TRIG 153*  --    ALT 46 40   CR 0.94 1.15   GFRESTIMATED 88 69   GFRESTBLACK >90 80   POTASSIUM 3.8 3.9   TSH 1.68  --         BP Readings from Last 3 Encounters:   12/07/20 138/82   12/02/20 120/84   11/23/20 (!) 146/94    Wt Readings from Last 3 Encounters:   12/07/20 90.3 kg (199 lb)   12/02/20 84.8 kg (187 lb)   11/23/20 87.7 kg (193 lb 4.8 oz)               Review of Systems   Constitutional, HEENT, cardiovascular, pulmonary, gi and gu systems are negative, except as otherwise noted.      Objective    /82 (BP Location: Left arm, Patient Position: Chair, Cuff Size: Adult Large)   Pulse 78   Temp 97.4  F (36.3  C) (Tympanic)   Ht 1.829 m (6')   Wt 90.3 kg (199 lb)   SpO2 98%   BMI 26.99 kg/m    Body mass index is 26.99 kg/m .       Physical Exam   GENERAL: healthy, alert and no distress  EYES: Eyes grossly normal to inspection, PERRL and conjunctivae and sclerae normal  NECK: no adenopathy, no asymmetry, masses, or scars and thyroid normal to palpation  RESP: lungs clear to auscultation - no rales, rhonchi or wheezes  CV: regular rate and rhythm, normal S1 S2, no S3 or S4, no murmur, click or rub, no peripheral edema and peripheral pulses strong  SKIN: no suspicious lesions or rashes  PSYCH: mentation appears normal, affect normal/bright            Assessment & Plan     Benign essential hypertension  - INTERNAL MEDICINE REFERRAL       Sylwia Arias CNP  [facility]

## 2020-12-08 ENCOUNTER — OFFICE VISIT (OUTPATIENT)
Dept: INTERNAL MEDICINE | Facility: OTHER | Age: 60
End: 2020-12-08
Attending: INTERNAL MEDICINE
Payer: COMMERCIAL

## 2020-12-08 VITALS
SYSTOLIC BLOOD PRESSURE: 120 MMHG | BODY MASS INDEX: 26.72 KG/M2 | DIASTOLIC BLOOD PRESSURE: 82 MMHG | HEART RATE: 78 BPM | TEMPERATURE: 96.9 F | WEIGHT: 197 LBS | OXYGEN SATURATION: 97 %

## 2020-12-08 DIAGNOSIS — I10 BENIGN ESSENTIAL HYPERTENSION: ICD-10-CM

## 2020-12-08 PROCEDURE — 99242 OFF/OP CONSLTJ NEW/EST SF 20: CPT | Performed by: INTERNAL MEDICINE

## 2020-12-08 RX ORDER — METOPROLOL SUCCINATE 100 MG/1
TABLET, EXTENDED RELEASE ORAL
Qty: 90 TABLET | Refills: 1 | Status: SHIPPED | OUTPATIENT
Start: 2020-12-08 | End: 2021-04-26

## 2020-12-08 RX ORDER — METOPROLOL SUCCINATE 100 MG/1
100 TABLET, EXTENDED RELEASE ORAL DAILY
Qty: 30 TABLET | Refills: 3 | Status: SHIPPED | OUTPATIENT
Start: 2020-12-08 | End: 2020-12-08

## 2020-12-08 ASSESSMENT — PAIN SCALES - GENERAL: PAINLEVEL: NO PAIN (0)

## 2020-12-08 NOTE — PROGRESS NOTES
Subjective     Bird Cedeno is a 60 year old male who presents to clinic today for the following health issues:    HPI         Hypertension Follow-up      Do you check your blood pressure regularly outside of the clinic? No     Are you following a low salt diet? Yes    Are your blood pressures ever more than 140 on the top number (systolic) OR more   than 90 on the bottom number (diastolic), for example 140/90? Yes    Bird presents today in consultation due to HTN and reports he has trouble relaxing at times.  BPs have been ok but borderline at times.  He denies chest pain or SOB.  No fevers.   We discussed the possibility of anxiety playing a role.         Review of Systems   Constitutional, HEENT, cardiovascular, pulmonary, gi and gu systems are negative, except as otherwise noted.      Objective    There were no vitals taken for this visit.  There is no height or weight on file to calculate BMI.  Physical Exam   GENERAL: healthy, alert and no distress  RESP: lungs clear to auscultation - no rales, rhonchi or wheezes  CV: regular rate and rhythm, normal S1 S2, no S3 or S4, no murmur, click or rub, no peripheral edema and peripheral pulses strong  MS: no gross musculoskeletal defects noted, no edema  SKIN: no suspicious lesions or rashes  NEURO: Normal strength and tone, mentation intact and speech normal  PSYCH: mentation appears normal, affect normal/bright    No results found for this or any previous visit (from the past 24 hour(s)).  No results found for any visits on 12/08/20.  Office Visit on 11/23/2020   Component Date Value Ref Range Status     TSH 11/23/2020 1.68  0.40 - 4.00 mU/L Final     Sodium 11/23/2020 138  133 - 144 mmol/L Final     Potassium 11/23/2020 3.8  3.4 - 5.3 mmol/L Final     Chloride 11/23/2020 105  94 - 109 mmol/L Final     Carbon Dioxide 11/23/2020 24  20 - 32 mmol/L Final     Anion Gap 11/23/2020 9  3 - 14 mmol/L Final     Glucose 11/23/2020 118* 70 - 99 mg/dL Final    Fasting specimen      Urea Nitrogen 11/23/2020 19  7 - 30 mg/dL Final     Creatinine 11/23/2020 0.94  0.66 - 1.25 mg/dL Final     GFR Estimate 11/23/2020 88  >60 mL/min/[1.73_m2] Final    Comment: Non  GFR Calc  Starting 12/18/2018, serum creatinine based estimated GFR (eGFR) will be   calculated using the Chronic Kidney Disease Epidemiology Collaboration   (CKD-EPI) equation.       GFR Estimate If Black 11/23/2020 >90  >60 mL/min/[1.73_m2] Final    Comment:  GFR Calc  Starting 12/18/2018, serum creatinine based estimated GFR (eGFR) will be   calculated using the Chronic Kidney Disease Epidemiology Collaboration   (CKD-EPI) equation.       Calcium 11/23/2020 9.5  8.5 - 10.1 mg/dL Final     Bilirubin Total 11/23/2020 0.4  0.2 - 1.3 mg/dL Final     Albumin 11/23/2020 4.0  3.4 - 5.0 g/dL Final     Protein Total 11/23/2020 8.0  6.8 - 8.8 g/dL Final     Alkaline Phosphatase 11/23/2020 57  40 - 150 U/L Final     ALT 11/23/2020 46  0 - 70 U/L Final     AST 11/23/2020 16  0 - 45 U/L Final     Cholesterol 11/23/2020 180  <200 mg/dL Final     Triglycerides 11/23/2020 153* <150 mg/dL Final    Comment: Borderline high:  150-199 mg/dl  High:             200-499 mg/dl  Very high:       >499 mg/dl  Fasting specimen       HDL Cholesterol 11/23/2020 46  >39 mg/dL Final     LDL Cholesterol Calculated 11/23/2020 103* <100 mg/dL Final    Comment: Above desirable:  100-129 mg/dl  Borderline High:  130-159 mg/dL  High:             160-189 mg/dL  Very high:       >189 mg/dl       Non HDL Cholesterol 11/23/2020 134* <130 mg/dL Final    Comment: Above Desirable:  130-159 mg/dl  Borderline high:  160-189 mg/dl  High:             190-219 mg/dl  Very high:       >219 mg/dl               Assessment & Plan   Problem List Items Addressed This Visit        Circulatory    Benign essential hypertension    Relevant Medications    metoprolol succinate ER (TOPROL-XL) 100 MG 24 hr tablet         Stop Amlodipine    Start Toprol  mg PO  daily         Jason Leal, [facility] [place] [facility]    CC:  Sylwia Arias NP

## 2020-12-11 DIAGNOSIS — H90.3 SNHL (SENSORY-NEURAL HEARING LOSS), ASYMMETRICAL: Primary | ICD-10-CM

## 2020-12-14 ENCOUNTER — TELEPHONE (OUTPATIENT)
Dept: AUDIOLOGY | Facility: OTHER | Age: 60
End: 2020-12-14

## 2020-12-16 ENCOUNTER — OFFICE VISIT (OUTPATIENT)
Dept: INTERNAL MEDICINE | Facility: OTHER | Age: 60
End: 2020-12-16
Attending: INTERNAL MEDICINE
Payer: COMMERCIAL

## 2020-12-16 VITALS
HEART RATE: 75 BPM | TEMPERATURE: 97 F | SYSTOLIC BLOOD PRESSURE: 118 MMHG | OXYGEN SATURATION: 97 % | DIASTOLIC BLOOD PRESSURE: 82 MMHG

## 2020-12-16 DIAGNOSIS — I10 ESSENTIAL HYPERTENSION: Primary | ICD-10-CM

## 2020-12-16 PROCEDURE — 99213 OFFICE O/P EST LOW 20 MIN: CPT | Performed by: INTERNAL MEDICINE

## 2020-12-16 ASSESSMENT — PAIN SCALES - GENERAL: PAINLEVEL: NO PAIN (0)

## 2020-12-16 NOTE — PROGRESS NOTES
Subjective     Bird Cedeno is a 60 year old male who presents to clinic today for the following health issues:    HPI         Hypertension Follow-up      Do you check your blood pressure regularly outside of the clinic? Yes     Are you following a low salt diet? Yes    Are your blood pressures ever more than 140 on the top number (systolic) OR more   than 90 on the bottom number (diastolic), for example 140/90? Yes    Most recent bp at home 168/108, pulse 77     Bird presents today for follow up for his HTN.   His BP at e is said to be high but two measurements here have been in normal range.  He does describe some vague SOB and a sensation in his chest at times not associated with exertion.          Review of Systems   Constitutional, HEENT, cardiovascular, pulmonary, gi and gu systems are negative, except as otherwise noted.      Objective    There were no vitals taken for this visit.  There is no height or weight on file to calculate BMI.  Physical Exam   GENERAL: healthy, alert and no distress  CV: regular rate and rhythm, normal S1 S2, no S3 or S4, no murmur, click or rub, no peripheral edema and peripheral pulses strong  PSYCH: mentation appears normal, affect normal/bright    No results found for this or any previous visit (from the past 24 hour(s)).  No results found for any visits on 12/16/20.  Office Visit on 11/23/2020   Component Date Value Ref Range Status     TSH 11/23/2020 1.68  0.40 - 4.00 mU/L Final     Sodium 11/23/2020 138  133 - 144 mmol/L Final     Potassium 11/23/2020 3.8  3.4 - 5.3 mmol/L Final     Chloride 11/23/2020 105  94 - 109 mmol/L Final     Carbon Dioxide 11/23/2020 24  20 - 32 mmol/L Final     Anion Gap 11/23/2020 9  3 - 14 mmol/L Final     Glucose 11/23/2020 118* 70 - 99 mg/dL Final    Fasting specimen     Urea Nitrogen 11/23/2020 19  7 - 30 mg/dL Final     Creatinine 11/23/2020 0.94  0.66 - 1.25 mg/dL Final     GFR Estimate 11/23/2020 88  >60 mL/min/[1.73_m2] Final    Comment:  Non  GFR Calc  Starting 12/18/2018, serum creatinine based estimated GFR (eGFR) will be   calculated using the Chronic Kidney Disease Epidemiology Collaboration   (CKD-EPI) equation.       GFR Estimate If Black 11/23/2020 >90  >60 mL/min/[1.73_m2] Final    Comment:  GFR Calc  Starting 12/18/2018, serum creatinine based estimated GFR (eGFR) will be   calculated using the Chronic Kidney Disease Epidemiology Collaboration   (CKD-EPI) equation.       Calcium 11/23/2020 9.5  8.5 - 10.1 mg/dL Final     Bilirubin Total 11/23/2020 0.4  0.2 - 1.3 mg/dL Final     Albumin 11/23/2020 4.0  3.4 - 5.0 g/dL Final     Protein Total 11/23/2020 8.0  6.8 - 8.8 g/dL Final     Alkaline Phosphatase 11/23/2020 57  40 - 150 U/L Final     ALT 11/23/2020 46  0 - 70 U/L Final     AST 11/23/2020 16  0 - 45 U/L Final     Cholesterol 11/23/2020 180  <200 mg/dL Final     Triglycerides 11/23/2020 153* <150 mg/dL Final    Comment: Borderline high:  150-199 mg/dl  High:             200-499 mg/dl  Very high:       >499 mg/dl  Fasting specimen       HDL Cholesterol 11/23/2020 46  >39 mg/dL Final     LDL Cholesterol Calculated 11/23/2020 103* <100 mg/dL Final    Comment: Above desirable:  100-129 mg/dl  Borderline High:  130-159 mg/dL  High:             160-189 mg/dL  Very high:       >189 mg/dl       Non HDL Cholesterol 11/23/2020 134* <130 mg/dL Final    Comment: Above Desirable:  130-159 mg/dl  Borderline high:  160-189 mg/dl  High:             190-219 mg/dl  Very high:       >219 mg/dl               Assessment & Plan   Problem List Items Addressed This Visit     None      Visit Diagnoses     Essential hypertension    -  Primary    Relevant Orders    Exercise Stress Test - Adult             Jason Leal DO  [facility]

## 2020-12-16 NOTE — NURSING NOTE
Chief Complaint   Patient presents with     Hypertension       Initial /86 (BP Location: Left arm, Patient Position: Chair, Cuff Size: Adult Large)   Pulse 75   Temp 97  F (36.1  C) (Tympanic)   SpO2 97%  Estimated body mass index is 26.72 kg/m  as calculated from the following:    Height as of 12/7/20: 1.829 m (6').    Weight as of 12/8/20: 89.4 kg (197 lb).  Medication Reconciliation: complete  EDOUARD BUSTILLOS LPN

## 2020-12-17 ENCOUNTER — TELEPHONE (OUTPATIENT)
Dept: INTERNAL MEDICINE | Facility: OTHER | Age: 60
End: 2020-12-17

## 2020-12-17 NOTE — TELEPHONE ENCOUNTER
10:00 AM    Reason for Call: Phone Call    Description: Patient called having questions from his appointment 12/16/2020 and also about a sleep medication. Please call back    Was an appointment offered for this call? No  If yes : Appointment type              Date    Preferred method for responding to this message: Telephone Call  What is your phone number ? [phone]    If we cannot reach you directly, may we leave a detailed response at the number you provided? Yes    Can this message wait until your PCP/provider returns, if available today? YES    Padma Raya

## 2020-12-21 NOTE — TELEPHONE ENCOUNTER
Patient has insomnia. Therapies tried- Tylenol PM, not effective. Patient was seen on 12/16/2020 and forgot to mention this. Would like a script.   Please advise   EDOUARD BUSTILLOS LPN

## 2020-12-21 NOTE — TELEPHONE ENCOUNTER
Left voicemail for patient to call back and schedule, preferred in clinic, but informed it may be a telephone visit if needed.

## 2020-12-28 ENCOUNTER — NURSE TRIAGE (OUTPATIENT)
Dept: FAMILY MEDICINE | Facility: OTHER | Age: 60
End: 2020-12-28

## 2020-12-28 ENCOUNTER — OFFICE VISIT (OUTPATIENT)
Dept: FAMILY MEDICINE | Facility: OTHER | Age: 60
End: 2020-12-28
Attending: NURSE PRACTITIONER
Payer: COMMERCIAL

## 2020-12-28 VITALS
HEART RATE: 85 BPM | WEIGHT: 182 LBS | OXYGEN SATURATION: 96 % | DIASTOLIC BLOOD PRESSURE: 88 MMHG | HEIGHT: 72 IN | BODY MASS INDEX: 24.65 KG/M2 | TEMPERATURE: 97.7 F | SYSTOLIC BLOOD PRESSURE: 130 MMHG

## 2020-12-28 DIAGNOSIS — F41.9 ANXIETY: Primary | ICD-10-CM

## 2020-12-28 DIAGNOSIS — Z20.822 SUSPECTED COVID-19 VIRUS INFECTION: ICD-10-CM

## 2020-12-28 PROCEDURE — 36415 COLL VENOUS BLD VENIPUNCTURE: CPT | Performed by: NURSE PRACTITIONER

## 2020-12-28 PROCEDURE — 99214 OFFICE O/P EST MOD 30 MIN: CPT | Performed by: NURSE PRACTITIONER

## 2020-12-28 PROCEDURE — 86769 SARS-COV-2 COVID-19 ANTIBODY: CPT | Performed by: NURSE PRACTITIONER

## 2020-12-28 RX ORDER — ESCITALOPRAM OXALATE 10 MG/1
5 TABLET ORAL DAILY
Qty: 45 TABLET | Refills: 0 | Status: SHIPPED | OUTPATIENT
Start: 2020-12-28 | End: 2021-01-11

## 2020-12-28 RX ORDER — HYDROXYZINE HYDROCHLORIDE 25 MG/1
25 TABLET, FILM COATED ORAL EVERY 4 HOURS PRN
Qty: 60 TABLET | Refills: 0 | Status: SHIPPED | OUTPATIENT
Start: 2020-12-28 | End: 2021-02-26

## 2020-12-28 RX ORDER — TRAZODONE HYDROCHLORIDE 50 MG/1
50 TABLET, FILM COATED ORAL AT BEDTIME
Qty: 90 TABLET | Refills: 0 | Status: SHIPPED | OUTPATIENT
Start: 2020-12-28 | End: 2021-03-28

## 2020-12-28 ASSESSMENT — ANXIETY QUESTIONNAIRES
2. NOT BEING ABLE TO STOP OR CONTROL WORRYING: MORE THAN HALF THE DAYS
3. WORRYING TOO MUCH ABOUT DIFFERENT THINGS: SEVERAL DAYS
7. FEELING AFRAID AS IF SOMETHING AWFUL MIGHT HAPPEN: SEVERAL DAYS
GAD7 TOTAL SCORE: 12
6. BECOMING EASILY ANNOYED OR IRRITABLE: MORE THAN HALF THE DAYS
1. FEELING NERVOUS, ANXIOUS, OR ON EDGE: MORE THAN HALF THE DAYS
5. BEING SO RESTLESS THAT IT IS HARD TO SIT STILL: MORE THAN HALF THE DAYS
4. TROUBLE RELAXING: MORE THAN HALF THE DAYS

## 2020-12-28 ASSESSMENT — MIFFLIN-ST. JEOR: SCORE: 1678.55

## 2020-12-28 ASSESSMENT — PAIN SCALES - GENERAL: PAINLEVEL: NO PAIN (0)

## 2020-12-28 ASSESSMENT — PATIENT HEALTH QUESTIONNAIRE - PHQ9: SUM OF ALL RESPONSES TO PHQ QUESTIONS 1-9: 4

## 2020-12-28 NOTE — NURSING NOTE
Chief Complaint   Patient presents with     Anxiety       Initial /88 (BP Location: Right arm, Patient Position: Chair, Cuff Size: Adult Large)   Pulse 85   Temp 97.7  F (36.5  C) (Tympanic)   Ht 1.829 m (6')   Wt 82.6 kg (182 lb)   SpO2 96%   BMI 24.68 kg/m   Estimated body mass index is 24.68 kg/m  as calculated from the following:    Height as of this encounter: 1.829 m (6').    Weight as of this encounter: 82.6 kg (182 lb).  Medication Reconciliation: complete  Prabha Ho LPN

## 2020-12-28 NOTE — TELEPHONE ENCOUNTER
"Patient call back number: [phone]    Additional Information    Negative: Severe difficulty breathing (e.g., struggling for each breath, speaks in single words)    Negative: Bluish (or gray) lips or face now    Negative: Difficult to awaken or acting confused (e.g., disoriented, slurred speech)    Negative: Hysterical or combative behavior    Negative: Sounds like a life-threatening emergency to the triager    Negative: Chest pain    Negative: Palpitations, skipped heart beat, or rapid heart beat    Negative: Cough is main symptom    Negative: Suicide thoughts, threats, attempts, or questions    Negative: Depression is main problem or symptom (e.g., feelings of sadness or hopelessness)    Negative: [1] Difficulty breathing AND [2] persists > 10 minutes AND [3] not relieved by reassurance provided by triager    Negative: [1] Lightheadedness or dizziness AND [2] persists > 10 minutes AND [3] not relieved by reassurance provided by triager    Negative: [1] Alcohol or drug abuse, known or suspected AND [2] feeling very shaky (i.e., visible tremors of hands)    Negative: Patient sounds very sick or weak to the triager    Negative: Symptoms interfere with work or school    Negative: Requesting to talk to a counselor (e.g., mental health worker, psychiatrist)    Negative: Patient sounds very upset or troubled to the triager    [1] Symptoms of anxiety or panic AND [2] has not been evaluated for this by physician    Answer Assessment - Initial Assessment Questions  1. CONCERN: \"What happened that made you call today?\"      Anxiety not sleeping  2. ANXIETY SYMPTOM SCREENING: \"Can you describe how you have been feeling?\"  (e.g., tense, restless, panicky, anxious, keyed up, trouble sleeping, trouble concentrating)      Anxious trouble sleeping tense  3. ONSET: \"How long have you been feeling this way?\"      2 weeks ago  4. RECURRENT: \"Have you felt this way before?\"  If yes: \"What happened that time?\" \"What helped these " "feelings go away in the past?\"       no  5. RISK OF HARM - SUICIDAL IDEATION:  \"Do you ever have thoughts of hurting or killing yourself?\"  (e.g., yes, no, no but preoccupation with thoughts about death)    - INTENT:  \"Do you have thoughts of hurting or killing yourself right NOW?\" (e.g., yes, no, N/A)    - PLAN: \"Do you have a specific plan for how you would do this?\" (e.g., gun, knife, overdose, no plan, N/A)      Denies any thoughts of harming self and/or others  6. RISK OF HARM - HOMICIDAL IDEATION:  \"Do you ever have thoughts of hurting or killing someone else?\"  (e.g., yes, no, no but preoccupation with thoughts about death)    - INTENT:  \"Do you have thoughts of hurting or killing someone right NOW?\" (e.g., yes, no, N/A)    - PLAN: \"Do you have a specific plan for how you would do this?\" (e.g., gun, knife, no plan, N/A)       no  7. FUNCTIONAL IMPAIRMENT: \"How have things been going for you overall in your life? Have you had any more difficulties than usual doing your normal daily activities?\"  (e.g., better, same, worse; self-care, school, work, interactions)      \"its getting worse\" able to care for self and work  8. SUPPORT: \"Who is with you now?\" \"Who do you live with?\" \"Do you have family or friends nearby who you can talk to?\"       yes  9. THERAPIST: \"Do you have a counselor or therapist? Name?\"      no  10. STRESSORS: \"Has there been any new stress or recent changes in your life?\"        Sick with the flu last november 11. CAFFEINE ABUSE: \"Do you drink caffeinated beverages, and how much each day?\" (e.g., coffee, tea, kristin)        no  12. SUBSTANCE ABUSE: \"Do you use any illegal drugs or alcohol?\"        no  13. OTHER SYMPTOMS: \"Do you have any other physical symptoms right now?\" (e.g., chest pain, palpitations, difficulty breathing, fever)        no  14. PREGNANCY: \"Is there any chance you are pregnant?\" \"When was your last menstrual period?\"        n/a    Protocols used: ANXIETY AND PANIC " ATTACK-A-AH

## 2020-12-28 NOTE — PROGRESS NOTES
Subjective     Bird Cedeno is a 60 year old male who presents to clinic today for the following health issues:    HPI         Abnormal Mood Symptoms  Onset/Duration: 3 weeks  Description: Bird is having unprovoked anxiety attacks were he will experience a warm sensation starting from his feet and spreading through his body. He does experience a racing heart rated. This started after recovering about a month ago from an illness that he believes was COVID. This illness starting in the beginning of November and lasted about a month. He self quarentined but was not tested. He finds himself worrying about his current health and the possibility of getting ill again like in November. He also lives alone and reports the isolation exacerbates his anxiety. Additionally, he has an aged mother, in her 90's, who he worries about.  He has no hx of anxiety or depression in the past but does identify both as current concerns anxiety > depression.    Accompanying Signs & Symptoms:  Still participating in activities that you used to enjoy: YES  Fatigue: YES  Irritability: YES  Difficulty concentrating: YES  Changes in appetite: no  Problems with sleep: YES  Heart racing/beating fast: YES  Abnormally elevated, expansive, or irritable mood: YES  Persistently increased activity or energy: YES  Thoughts of hurting yourself or others: no  History:  Recent stress or major life event: YES- got really sick in Nov  Prior depression or anxiety: None  Family history of depression or anxiety: no  Alcohol/drug use: no  Difficulty sleeping: YES  Precipitating or alleviating factors: elevated b/p , panic attacks   Therapies tried and outcome:  Working out     PHQ 12/28/2020   PHQ-9 Total Score 4   Q9: Thoughts of better off dead/self-harm past 2 weeks Not at all     MAHENDRA-7 SCORE 12/28/2020   Total Score 12     Bird has been treated for hypertension, which has been at goal on metoprolol. He has stopped his atorvastatin stating it made him feel  kya.  He has concerns about trying another statin at this time.    The 10-year ASCVD risk score (Newarkthien BO Jr., et al., 2013) is: 9.1%    Values used to calculate the score:      Age: 60 years      Sex: Male      Is Non- : No      Diabetic: No      Tobacco smoker: No      Systolic Blood Pressure: 130 mmHg      Is BP treated: Yes      HDL Cholesterol: 46 mg/dL      Total Cholesterol: 180 mg/dL      Review of Systems   Constitutional, HEENT, cardiovascular, pulmonary, GI, , musculoskeletal, neuro, skin, endocrine and psych systems are negative, except as otherwise noted.        Objective    /88 (BP Location: Right arm, Patient Position: Chair, Cuff Size: Adult Large)   Pulse 85   Temp 97.7  F (36.5  C) (Tympanic)   Ht 1.829 m (6')   Wt 82.6 kg (182 lb)   SpO2 96%   BMI 24.68 kg/m    Body mass index is 24.68 kg/m .     Physical Exam   GENERAL: healthy, alert and no distress  EYES: Eyes grossly normal to inspection, PERRL and conjunctivae and sclerae normal  NECK: no adenopathy, no asymmetry, masses, or scars and thyroid normal to palpation  RESP: lungs clear to auscultation - no rales, rhonchi or wheezes  CV: regular rate and rhythm, normal S1 S2, no S3 or S4, no murmur, click or rub, no peripheral edema and peripheral pulses strong  PSYCH: mentation appears normal, anxious, judgement and insight intact and appearance well groomed            Assessment & Plan   1. Anxiety  Instructed to not drive while on hydroxyzine or trazodone and until he knows how escitalopram affects him and to separate hydroxyzine from trazodone by at least 4 hours.   Follow up in 1 month this may be virtual or in clinic.   - escitalopram (LEXAPRO) 10 MG tablet; Take 0.5 tablets (5 mg) by mouth daily Half tab for 1 week and then full tab after that.  Dispense: 45 tablet; Refill: 0  - hydrOXYzine (ATARAX) 25 MG tablet; Take 1 tablet (25 mg) by mouth every 4 hours as needed for anxiety Do not take with  trazodone.  Dispense: 60 tablet; Refill: 0  - traZODone (DESYREL) 50 MG tablet; Take 1 tablet (50 mg) by mouth At Bedtime For sleep  Dispense: 90 tablet; Refill: 0    2. Suspected COVID-19 virus infection  Based on HPI, I do suspect that Bird had COVID. He is currently asymptomatic. We have discussed the option to check antibodies and wishing to do this. It is possible that this illness precipitated the anxiety.     - COVID-19 Virus (Coronavirus) Antibody Screen, IgG         Return in about 4 weeks (around 1/25/2021).    Solange Pretty, CNP  [facility]

## 2020-12-29 LAB
COVID-19 ANTIBODY IGG: NEGATIVE
LAB TEST METHOD: NORMAL

## 2020-12-29 ASSESSMENT — ANXIETY QUESTIONNAIRES: GAD7 TOTAL SCORE: 12

## 2021-01-11 ENCOUNTER — OFFICE VISIT (OUTPATIENT)
Dept: FAMILY MEDICINE | Facility: OTHER | Age: 61
End: 2021-01-11
Attending: NURSE PRACTITIONER
Payer: COMMERCIAL

## 2021-01-11 VITALS
HEIGHT: 72 IN | DIASTOLIC BLOOD PRESSURE: 70 MMHG | SYSTOLIC BLOOD PRESSURE: 122 MMHG | OXYGEN SATURATION: 96 % | TEMPERATURE: 98 F | BODY MASS INDEX: 25.19 KG/M2 | WEIGHT: 186 LBS | HEART RATE: 67 BPM

## 2021-01-11 DIAGNOSIS — F41.9 ANXIETY: ICD-10-CM

## 2021-01-11 PROCEDURE — 99213 OFFICE O/P EST LOW 20 MIN: CPT | Performed by: NURSE PRACTITIONER

## 2021-01-11 RX ORDER — ESCITALOPRAM OXALATE 10 MG/1
10 TABLET ORAL DAILY
Qty: 90 TABLET | Refills: 0 | Status: SHIPPED | OUTPATIENT
Start: 2021-01-11 | End: 2021-12-28

## 2021-01-11 RX ORDER — GINSENG 100 MG
CAPSULE ORAL
COMMUNITY

## 2021-01-11 ASSESSMENT — PATIENT HEALTH QUESTIONNAIRE - PHQ9: SUM OF ALL RESPONSES TO PHQ QUESTIONS 1-9: 2

## 2021-01-11 ASSESSMENT — ANXIETY QUESTIONNAIRES
4. TROUBLE RELAXING: NOT AT ALL
3. WORRYING TOO MUCH ABOUT DIFFERENT THINGS: NOT AT ALL
GAD7 TOTAL SCORE: 0
IF YOU CHECKED OFF ANY PROBLEMS ON THIS QUESTIONNAIRE, HOW DIFFICULT HAVE THESE PROBLEMS MADE IT FOR YOU TO DO YOUR WORK, TAKE CARE OF THINGS AT HOME, OR GET ALONG WITH OTHER PEOPLE: NOT DIFFICULT AT ALL
6. BECOMING EASILY ANNOYED OR IRRITABLE: NOT AT ALL
7. FEELING AFRAID AS IF SOMETHING AWFUL MIGHT HAPPEN: NOT AT ALL
5. BEING SO RESTLESS THAT IT IS HARD TO SIT STILL: NOT AT ALL
2. NOT BEING ABLE TO STOP OR CONTROL WORRYING: NOT AT ALL
1. FEELING NERVOUS, ANXIOUS, OR ON EDGE: NOT AT ALL

## 2021-01-11 ASSESSMENT — PAIN SCALES - GENERAL: PAINLEVEL: NO PAIN (0)

## 2021-01-11 ASSESSMENT — MIFFLIN-ST. JEOR: SCORE: 1696.69

## 2021-01-11 NOTE — NURSING NOTE
Chief Complaint   Patient presents with     Anxiety       Initial /70 (BP Location: Right arm, Patient Position: Chair, Cuff Size: Adult Regular)   Pulse 67   Temp 98  F (36.7  C) (Tympanic)   Ht 1.829 m (6')   Wt 84.4 kg (186 lb)   SpO2 96%   BMI 25.23 kg/m   Estimated body mass index is 25.23 kg/m  as calculated from the following:    Height as of this encounter: 1.829 m (6').    Weight as of this encounter: 84.4 kg (186 lb).  Medication Reconciliation: complete  Mercedes Correa LPN

## 2021-01-11 NOTE — PROGRESS NOTES
Assessment & Plan   Problem List Items Addressed This Visit     None      Visit Diagnoses     Anxiety        Relevant Medications    escitalopram (LEXAPRO) 10 MG tablet        Return in about 6 months (around 7/11/2021).    Solange Pretty, CNP  [facility] SHALINI Pang is a 60 year old who presents to clinic today for the following health issues     HPI   Anxiety Follow-Up    How are you doing with your anxiety since your last visit? Improved     Are you having other symptoms that might be associated with anxiety? No    Have you had a significant life event? No     Are you feeling depressed? No    Do you have any concerns with your use of alcohol or other drugs? No     MAHENDRA-7 SCORE 12/28/2020 1/11/2021   Total Score 12 0     PHQ 12/28/2020 1/11/2021   PHQ-9 Total Score 4 2   Q9: Thoughts of better off dead/self-harm past 2 weeks Not at all Not at all       Social History     Tobacco Use     Smoking status: Never Smoker     Smokeless tobacco: Never Used   Substance Use Topics     Alcohol use: Yes     Drug use: No     Last PHQ-9 1/11/2021   1.  Little interest or pleasure in doing things 0   2.  Feeling down, depressed, or hopeless 1   3.  Trouble falling or staying asleep, or sleeping too much 1   4.  Feeling tired or having little energy 0   5.  Poor appetite or overeating 0   6.  Feeling bad about yourself 0   7.  Trouble concentrating 0   8.  Moving slowly or restless 0   Q9: Thoughts of better off dead/self-harm past 2 weeks 0   PHQ-9 Total Score 2   Difficulty at work, home, or with people Not difficult at all     MAHENDRA-7  1/11/2021   1. Feeling nervous, anxious, or on edge 0   2. Not being able to stop or control worrying 0   3. Worrying too much about different things 0   4. Trouble relaxing 0   5. Being so restless that it is hard to sit still 0   6. Becoming easily annoyed or irritable 0   7. Feeling afraid, as if something awful might happen 0   MAHENDRA-7 Total Score 0   If you  checked any problems, how difficult have they made it for you to do your work, take care of things at home, or get along with other people? Not difficult at all         How many servings of fruits and vegetables do you eat daily?  2-3    On average, how many sweetened beverages do you drink each day (Examples: soda, juice, sweet tea, etc.  Do NOT count diet or artificially sweetened beverages)?   0    How many days per week do you exercise enough to make your heart beat faster? 5    How many minutes a day do you exercise enough to make your heart beat faster? 9 or less    How many days per week do you miss taking your medication? 0        Review of Systems   Constitutional, HEENT, cardiovascular, pulmonary, gi and gu systems are negative, except as otherwise noted.      Objective    /70 (BP Location: Right arm, Patient Position: Chair, Cuff Size: Adult Regular)   Pulse 67   Temp 98  F (36.7  C) (Tympanic)   Ht 1.829 m (6')   Wt 84.4 kg (186 lb)   SpO2 96%   BMI 25.23 kg/m    Body mass index is 25.23 kg/m .     Physical Exam   GENERAL: healthy, alert and no distress  NECK: no adenopathy, no asymmetry, masses, or scars and thyroid normal to palpation  RESP: lungs clear to auscultation - no rales, rhonchi or wheezes  CV: regular rate and rhythm, normal S1 S2, no S3 or S4, no murmur, click or rub, no peripheral edema and peripheral pulses strong.   PSYCH: mentation appears normal, affect normal/bright

## 2021-01-12 ASSESSMENT — ANXIETY QUESTIONNAIRES: GAD7 TOTAL SCORE: 0

## 2021-03-28 DIAGNOSIS — F41.9 ANXIETY: ICD-10-CM

## 2021-03-28 RX ORDER — TRAZODONE HYDROCHLORIDE 50 MG/1
TABLET, FILM COATED ORAL
Qty: 90 TABLET | Refills: 0 | Status: SHIPPED | OUTPATIENT
Start: 2021-03-28 | End: 2021-12-28

## 2021-03-28 NOTE — TELEPHONE ENCOUNTER
Trazodone       Last Written Prescription Date:  12/28/2020  Last Fill Quantity: 90,   # refills: 0  Last Office Visit: 1/11/2021  Future Office visit:

## 2021-03-29 ENCOUNTER — OFFICE VISIT (OUTPATIENT)
Dept: FAMILY MEDICINE | Facility: OTHER | Age: 61
End: 2021-03-29
Attending: NURSE PRACTITIONER
Payer: COMMERCIAL

## 2021-03-29 VITALS
BODY MASS INDEX: 27.12 KG/M2 | TEMPERATURE: 98.1 F | WEIGHT: 200 LBS | DIASTOLIC BLOOD PRESSURE: 82 MMHG | OXYGEN SATURATION: 97 % | HEART RATE: 66 BPM | SYSTOLIC BLOOD PRESSURE: 138 MMHG

## 2021-03-29 DIAGNOSIS — F41.9 ANXIETY: ICD-10-CM

## 2021-03-29 PROCEDURE — 99214 OFFICE O/P EST MOD 30 MIN: CPT | Performed by: NURSE PRACTITIONER

## 2021-03-29 RX ORDER — TRAZODONE HYDROCHLORIDE 50 MG/1
TABLET, FILM COATED ORAL
Qty: 90 TABLET | Refills: 0 | Status: CANCELLED | OUTPATIENT
Start: 2021-03-29

## 2021-03-29 RX ORDER — ESCITALOPRAM OXALATE 20 MG/1
20 TABLET ORAL DAILY
Qty: 90 TABLET | Refills: 0 | Status: SHIPPED | OUTPATIENT
Start: 2021-03-29 | End: 2021-07-15

## 2021-03-29 RX ORDER — ESCITALOPRAM OXALATE 10 MG/1
10 TABLET ORAL DAILY
Qty: 90 TABLET | Refills: 0 | Status: CANCELLED | OUTPATIENT
Start: 2021-03-29

## 2021-03-29 ASSESSMENT — ANXIETY QUESTIONNAIRES
4. TROUBLE RELAXING: NOT AT ALL
GAD7 TOTAL SCORE: 0
5. BEING SO RESTLESS THAT IT IS HARD TO SIT STILL: NOT AT ALL
7. FEELING AFRAID AS IF SOMETHING AWFUL MIGHT HAPPEN: NOT AT ALL
2. NOT BEING ABLE TO STOP OR CONTROL WORRYING: NOT AT ALL
3. WORRYING TOO MUCH ABOUT DIFFERENT THINGS: NOT AT ALL
1. FEELING NERVOUS, ANXIOUS, OR ON EDGE: NOT AT ALL
IF YOU CHECKED OFF ANY PROBLEMS ON THIS QUESTIONNAIRE, HOW DIFFICULT HAVE THESE PROBLEMS MADE IT FOR YOU TO DO YOUR WORK, TAKE CARE OF THINGS AT HOME, OR GET ALONG WITH OTHER PEOPLE: NOT DIFFICULT AT ALL
6. BECOMING EASILY ANNOYED OR IRRITABLE: NOT AT ALL

## 2021-03-29 ASSESSMENT — PAIN SCALES - GENERAL: PAINLEVEL: NO PAIN (0)

## 2021-03-29 NOTE — PROGRESS NOTES
Assessment & Plan   Problem List Items Addressed This Visit     None      Visit Diagnoses     Anxiety      Lina and I discussed multiple options for treating his ongoing anxiety.  We discussed the option of adding buspirone to his current escitalopram dosage or moving up to 20 mg of escitalopram. Through shared decision making, we will increase Bird's escitalopram to 20 mg as it has worked well in the past but we have room to move.  As he feels the trazodone is helping him with sleep, but it leaves him sometimes feeling drowsy in the morning, we discussed the option of taking half of his 50 mg tablet.    Stop hydroxyzine.   Follow up in 6 months, sooner if needed.    Relevant Medications    escitalopram (LEXAPRO) 20 MG tablet         No follow-ups on file.    Solange Pretty, [facility]   Bird is a 60 year old who presents for the following health issues   HPI     Anxiety Follow-Up    How are you doing with your anxiety since your last visit? Improved - just a little anxious once in awhile - but would like an increase     Are you having other symptoms that might be associated with anxiety? No    Have you had a significant life event? No     Are you feeling depressed? No    Do you have any concerns with your use of alcohol or other drugs? No     Wakes up early if he does not take the trazodone. No coughing or SOB during the day.  No heart palpitations or chest pain.     Social History     Tobacco Use     Smoking status: Never Smoker     Smokeless tobacco: Never Used   Substance Use Topics     Alcohol use: Yes     Drug use: No     MAHENDRA-7 SCORE 12/28/2020 1/11/2021   Total Score 12 0     PHQ 12/28/2020 1/11/2021   PHQ-9 Total Score 4 2   Q9: Thoughts of better off dead/self-harm past 2 weeks Not at all Not at all     Last PHQ-9 1/11/2021   1.  Little interest or pleasure in doing things 0   2.  Feeling down, depressed, or hopeless 1   3.  Trouble falling or staying asleep,  or sleeping too much 1   4.  Feeling tired or having little energy 0   5.  Poor appetite or overeating 0   6.  Feeling bad about yourself 0   7.  Trouble concentrating 0   8.  Moving slowly or restless 0   Q9: Thoughts of better off dead/self-harm past 2 weeks 0   PHQ-9 Total Score 2   Difficulty at work, home, or with people Not difficult at all         How many servings of fruits and vegetables do you eat daily?  0-1    On average, how many sweetened beverages do you drink each day (Examples: soda, juice, sweet tea, etc.  Do NOT count diet or artificially sweetened beverages)?   1    How many days per week do you exercise enough to make your heart beat faster? 4    How many minutes a day do you exercise enough to make your heart beat faster? 30 - 60    How many days per week do you miss taking your medication? 0    Medication Followup of Lexapro    Taking Medication as prescribed: yes    Medication Helping Symptoms:  Yes- helping a lot but would like an increase of lexapro. Denies side effects    Feeling groggy some days after waking up, which he believes is from the trazodone. The trazodone is helping with his sleep and he would like to stay on it.    He is not liking the effects of the hydroxyzine. He has taken on rare occasion only. It makes him feel to lethargic.        Review of Systems   Constitutional, HEENT, cardiovascular, pulmonary, gi and gu systems are negative, except as otherwise noted.      Objective    /82 (BP Location: Right arm, Patient Position: Chair, Cuff Size: Adult Large)   Pulse 66   Temp 98.1  F (36.7  C) (Tympanic)   Wt 90.7 kg (200 lb)   SpO2 97%   BMI 27.12 kg/m    Body mass index is 27.12 kg/m .  Physical Exam   GENERAL: healthy, alert and no distress  RESP: lungs clear to auscultation - no rales, rhonchi or wheezes  CV: regular rate and rhythm, normal S1 S2, no S3 or S4, no murmur, click or rub, no peripheral edema and peripheral pulses strong  PSYCH: mentation appears  normal, affect normal/bright

## 2021-03-30 ASSESSMENT — ANXIETY QUESTIONNAIRES: GAD7 TOTAL SCORE: 0

## 2021-03-31 ENCOUNTER — IMMUNIZATION (OUTPATIENT)
Dept: FAMILY MEDICINE | Facility: OTHER | Age: 61
End: 2021-03-31
Attending: NURSE PRACTITIONER
Payer: COMMERCIAL

## 2021-03-31 PROCEDURE — 0001A PR COVID VAC PFIZER DIL RECON 30 MCG/0.3 ML IM: CPT

## 2021-03-31 PROCEDURE — 91300 PR COVID VAC PFIZER DIL RECON 30 MCG/0.3 ML IM: CPT

## 2021-04-19 ENCOUNTER — IMMUNIZATION (OUTPATIENT)
Dept: FAMILY MEDICINE | Facility: OTHER | Age: 61
End: 2021-04-19
Attending: FAMILY MEDICINE
Payer: COMMERCIAL

## 2021-04-19 PROCEDURE — 0002A PR COVID VAC PFIZER DIL RECON 30 MCG/0.3 ML IM: CPT

## 2021-04-19 PROCEDURE — 91300 PR COVID VAC PFIZER DIL RECON 30 MCG/0.3 ML IM: CPT

## 2021-04-25 DIAGNOSIS — I10 BENIGN ESSENTIAL HYPERTENSION: ICD-10-CM

## 2021-04-26 RX ORDER — METOPROLOL SUCCINATE 100 MG/1
TABLET, EXTENDED RELEASE ORAL
Qty: 30 TABLET | Refills: 3 | Status: SHIPPED | OUTPATIENT
Start: 2021-04-26 | End: 2021-08-31

## 2021-04-26 NOTE — TELEPHONE ENCOUNTER
metoprolol  Last Written Prescription Date:  12/8/2020  Last Fill Quantity: 90,   # refills: 1  Last Office Visit: 3/29/21  Future Office visit:

## 2021-07-14 DIAGNOSIS — F41.9 ANXIETY: ICD-10-CM

## 2021-07-15 RX ORDER — ESCITALOPRAM OXALATE 20 MG/1
TABLET ORAL
Qty: 90 TABLET | Refills: 0 | Status: SHIPPED | OUTPATIENT
Start: 2021-07-15 | End: 2021-12-28

## 2021-08-30 DIAGNOSIS — I10 BENIGN ESSENTIAL HYPERTENSION: ICD-10-CM

## 2021-08-31 RX ORDER — METOPROLOL SUCCINATE 100 MG/1
TABLET, EXTENDED RELEASE ORAL
Qty: 30 TABLET | Refills: 3 | Status: SHIPPED | OUTPATIENT
Start: 2021-08-31 | End: 2021-12-27

## 2021-08-31 NOTE — TELEPHONE ENCOUNTER
metoprolol      Last Written Prescription Date:  4/26/21  Last Fill Quantity: 30,   # refills: 3  Last Office Visit: 3/29/21  Future Office visit:

## 2021-12-10 ENCOUNTER — TELEPHONE (OUTPATIENT)
Dept: FAMILY MEDICINE | Facility: OTHER | Age: 61
End: 2021-12-10
Payer: COMMERCIAL

## 2021-12-10 DIAGNOSIS — I10 PRIMARY HYPERTENSION: Primary | ICD-10-CM

## 2021-12-17 ENCOUNTER — HOSPITAL ENCOUNTER (OUTPATIENT)
Dept: CARDIOLOGY | Facility: HOSPITAL | Age: 61
Discharge: HOME | End: 2021-12-17
Attending: INTERNAL MEDICINE | Admitting: INTERNAL MEDICINE
Payer: COMMERCIAL

## 2021-12-17 DIAGNOSIS — I10 PRIMARY HYPERTENSION: ICD-10-CM

## 2021-12-17 LAB
CV STRESS MAX HR HE: 124
RATE PRESSURE PRODUCT: NORMAL
STRESS ECHO BASELINE DIASTOLIC HE: 90
STRESS ECHO BASELINE HR: 76 BPM
STRESS ECHO BASELINE SYSTOLIC BP: 136
STRESS ECHO CALCULATED PERCENT HR: 78 %
STRESS ECHO LAST STRESS DIASTOLIC BP: 80
STRESS ECHO LAST STRESS SYSTOLIC BP: 200
STRESS ECHO POST ESTIMATED WORKLOAD: 10.1 METS
STRESS ECHO POST EXERCISE DUR MIN: 9 MIN
STRESS ECHO POST EXERCISE DUR SEC: 18 SEC
STRESS ECHO TARGET HR: 160

## 2021-12-17 PROCEDURE — 93017 CV STRESS TEST TRACING ONLY: CPT

## 2021-12-17 PROCEDURE — 93016 CV STRESS TEST SUPVJ ONLY: CPT | Performed by: INTERNAL MEDICINE

## 2021-12-17 PROCEDURE — 93018 CV STRESS TEST I&R ONLY: CPT | Performed by: INTERNAL MEDICINE

## 2021-12-21 ENCOUNTER — IMMUNIZATION (OUTPATIENT)
Dept: FAMILY MEDICINE | Facility: OTHER | Age: 61
End: 2021-12-21
Attending: NURSE PRACTITIONER
Payer: COMMERCIAL

## 2021-12-21 PROCEDURE — 0004A PR COVID VAC PFIZER DIL RECON 30 MCG/0.3 ML IM: CPT

## 2021-12-21 PROCEDURE — 91300 PR COVID VAC PFIZER DIL RECON 30 MCG/0.3 ML IM: CPT

## 2021-12-27 DIAGNOSIS — I10 BENIGN ESSENTIAL HYPERTENSION: ICD-10-CM

## 2021-12-27 RX ORDER — METOPROLOL SUCCINATE 100 MG/1
TABLET, EXTENDED RELEASE ORAL
Qty: 90 TABLET | Refills: 0 | Status: SHIPPED | OUTPATIENT
Start: 2021-12-27 | End: 2022-01-25

## 2021-12-27 NOTE — TELEPHONE ENCOUNTER
Toprol  Last Written Prescription Date: 12/27/21  Last Fill Quantity: 30 # of Refills: 0  Last Office Visit: 3/29/21    Filled today--please refuse and discard.

## 2021-12-28 ENCOUNTER — OFFICE VISIT (OUTPATIENT)
Dept: FAMILY MEDICINE | Facility: OTHER | Age: 61
End: 2021-12-28
Attending: NURSE PRACTITIONER
Payer: COMMERCIAL

## 2021-12-28 VITALS
HEART RATE: 72 BPM | BODY MASS INDEX: 28.99 KG/M2 | OXYGEN SATURATION: 96 % | TEMPERATURE: 98.2 F | DIASTOLIC BLOOD PRESSURE: 82 MMHG | HEIGHT: 72 IN | RESPIRATION RATE: 16 BRPM | SYSTOLIC BLOOD PRESSURE: 132 MMHG | WEIGHT: 214 LBS

## 2021-12-28 DIAGNOSIS — I10 BENIGN ESSENTIAL HYPERTENSION: Primary | ICD-10-CM

## 2021-12-28 PROCEDURE — 99212 OFFICE O/P EST SF 10 MIN: CPT | Performed by: NURSE PRACTITIONER

## 2021-12-28 ASSESSMENT — PATIENT HEALTH QUESTIONNAIRE - PHQ9
5. POOR APPETITE OR OVEREATING: NOT AT ALL
SUM OF ALL RESPONSES TO PHQ QUESTIONS 1-9: 1

## 2021-12-28 ASSESSMENT — ANXIETY QUESTIONNAIRES
6. BECOMING EASILY ANNOYED OR IRRITABLE: NOT AT ALL
7. FEELING AFRAID AS IF SOMETHING AWFUL MIGHT HAPPEN: NOT AT ALL
2. NOT BEING ABLE TO STOP OR CONTROL WORRYING: NOT AT ALL
3. WORRYING TOO MUCH ABOUT DIFFERENT THINGS: NOT AT ALL
GAD7 TOTAL SCORE: 0
1. FEELING NERVOUS, ANXIOUS, OR ON EDGE: NOT AT ALL
5. BEING SO RESTLESS THAT IT IS HARD TO SIT STILL: NOT AT ALL

## 2021-12-28 ASSESSMENT — PAIN SCALES - GENERAL: PAINLEVEL: NO PAIN (0)

## 2021-12-28 ASSESSMENT — MIFFLIN-ST. JEOR: SCORE: 1818.7

## 2021-12-28 NOTE — PROGRESS NOTES
Assessment & Plan     Benign essential hypertension  Metoprolol refilled yesterday (12/27).Continue home monitoring and return if average is above target.    Continue to work on diet and exercise with weight loss as a goal. Return in 9-12 months, sooner if blood pressure is elevated or having any concerns.     Prescription drug management     BMI:   Estimated body mass index is 29.02 kg/m  as calculated from the following:    Height as of this encounter: 1.829 m (6').    Weight as of this encounter: 97.1 kg (214 lb).   Weight management plan: Discussed healthy diet and exercise guidelines      No follow-ups on file.    Solange Pretty, CNP  [facility] - [place] SHALINI Pang is a 61 year old who presents for the following health issues     HPI   Hypertension Follow-up  Stress test earlier this month was negativ.     Do you check your blood pressure regularly outside of the clinic? No     Are you following a low salt diet? Yes    Are your blood pressures ever more than 140 on the top number (systolic) OR more   than 90 on the bottom number (diastolic), for example 140/90? No      How many servings of fruits and vegetables do you eat daily?  0-1    On average, how many sweetened beverages do you drink each day (Examples: soda, juice, sweet tea, etc.  Do NOT count diet or artificially sweetened beverages)?   0    How many days per week do you exercise enough to make your heart beat faster? 3 or less    How many minutes a day do you exercise enough to make your heart beat faster? 60 or more    How many days per week do you miss taking your medication? 0    Stopped all medications (lexapro) accept for metoprolol and reports he is feeling great and sleeping well.     Review of Systems   Constitutional, HEENT, cardiovascular, pulmonary, gi and gu systems are negative, except as otherwise noted.      Objective    /82 (BP Location: Right arm, Patient Position: Sitting, Cuff Size: Adult Large)    Pulse 72   Temp 98.2  F (36.8  C) (Tympanic)   Resp 16   Ht 1.829 m (6')   Wt 97.1 kg (214 lb)   SpO2 96%   BMI 29.02 kg/m    Body mass index is 29.02 kg/m .     Physical Exam   GENERAL: healthy, alert and no distress  EYES: Eyes grossly normal to inspection, PERRL and conjunctivae and sclerae normal  NECK: no adenopathy, no asymmetry, masses, or scars and thyroid normal to palpation  RESP: lungs clear to auscultation - no rales, rhonchi or wheezes  CV: regular rate and rhythm, normal S1 S2, no S3 or S4, no murmur, click or rub, no peripheral edema and peripheral pulses strong

## 2021-12-28 NOTE — NURSING NOTE
Chief Complaint   Patient presents with     Hypertension       Initial /82 (BP Location: Right arm, Patient Position: Sitting, Cuff Size: Adult Large)   Pulse 72   Temp 98.2  F (36.8  C) (Tympanic)   Resp 16   Ht 1.829 m (6')   Wt 97.1 kg (214 lb)   SpO2 96%   BMI 29.02 kg/m   Estimated body mass index is 29.02 kg/m  as calculated from the following:    Height as of this encounter: 1.829 m (6').    Weight as of this encounter: 97.1 kg (214 lb).  Medication Reconciliation: complete  Adia Ta LPN

## 2021-12-29 ASSESSMENT — ANXIETY QUESTIONNAIRES: GAD7 TOTAL SCORE: 0

## 2022-01-24 DIAGNOSIS — I10 BENIGN ESSENTIAL HYPERTENSION: ICD-10-CM

## 2022-01-25 RX ORDER — METOPROLOL SUCCINATE 100 MG/1
TABLET, EXTENDED RELEASE ORAL
Qty: 90 TABLET | Refills: 0 | Status: SHIPPED | OUTPATIENT
Start: 2022-01-25 | End: 2022-04-13

## 2022-01-25 NOTE — TELEPHONE ENCOUNTER
Toprol      Last Written Prescription Date:  12/27/21  Last Fill Quantity: 90,   # refills: 0  Last Office Visit: 12/28/21  Future Office visit:

## 2022-03-04 ENCOUNTER — ALLIED HEALTH/NURSE VISIT (OUTPATIENT)
Dept: FAMILY MEDICINE | Facility: OTHER | Age: 62
End: 2022-03-04
Attending: NURSE PRACTITIONER
Payer: COMMERCIAL

## 2022-03-04 VITALS
HEART RATE: 76 BPM | DIASTOLIC BLOOD PRESSURE: 88 MMHG | SYSTOLIC BLOOD PRESSURE: 144 MMHG | TEMPERATURE: 97.7 F | OXYGEN SATURATION: 95 %

## 2022-03-04 DIAGNOSIS — Z01.30 BLOOD PRESSURE CHECK: Primary | ICD-10-CM

## 2022-03-04 PROCEDURE — 99207 PR NO CHARGE NURSE ONLY: CPT

## 2022-03-04 NOTE — PROGRESS NOTES
Patient presented today for a blood pressure check, BP: 144/88 P: 76. Patient declined waiting for recheck.      BP Readings from Last 3 Encounters:   03/04/22 (!) 144/88   12/28/21 132/82   03/29/21 138/82      Mercedes Correa LPN

## 2022-03-07 NOTE — PROGRESS NOTES
Detailed message left on patient's personalized voicemail to call clinic with pharmacy information.

## 2022-03-08 ENCOUNTER — OFFICE VISIT (OUTPATIENT)
Dept: FAMILY MEDICINE | Facility: OTHER | Age: 62
End: 2022-03-08
Attending: NURSE PRACTITIONER
Payer: COMMERCIAL

## 2022-03-08 VITALS
RESPIRATION RATE: 20 BRPM | HEIGHT: 72 IN | SYSTOLIC BLOOD PRESSURE: 138 MMHG | DIASTOLIC BLOOD PRESSURE: 88 MMHG | OXYGEN SATURATION: 96 % | WEIGHT: 213 LBS | TEMPERATURE: 97.6 F | BODY MASS INDEX: 28.85 KG/M2 | HEART RATE: 84 BPM

## 2022-03-08 DIAGNOSIS — I10 BENIGN ESSENTIAL HYPERTENSION: Primary | ICD-10-CM

## 2022-03-08 DIAGNOSIS — R73.09 ELEVATED GLUCOSE: ICD-10-CM

## 2022-03-08 LAB
ALBUMIN SERPL-MCNC: 4.3 G/DL (ref 3.4–5)
ALP SERPL-CCNC: 58 U/L (ref 40–150)
ALT SERPL W P-5'-P-CCNC: 52 U/L (ref 0–70)
ANION GAP SERPL CALCULATED.3IONS-SCNC: 5 MMOL/L (ref 3–14)
AST SERPL W P-5'-P-CCNC: 16 U/L (ref 0–45)
BILIRUB SERPL-MCNC: 0.7 MG/DL (ref 0.2–1.3)
BUN SERPL-MCNC: 16 MG/DL (ref 7–30)
CALCIUM SERPL-MCNC: 9.7 MG/DL (ref 8.5–10.1)
CHLORIDE BLD-SCNC: 106 MMOL/L (ref 94–109)
CO2 SERPL-SCNC: 24 MMOL/L (ref 20–32)
CREAT SERPL-MCNC: 0.88 MG/DL (ref 0.66–1.25)
EST. AVERAGE GLUCOSE BLD GHB EST-MCNC: 126 MG/DL
GFR SERPL CREATININE-BSD FRML MDRD: >90 ML/MIN/1.73M2
GLUCOSE BLD-MCNC: 102 MG/DL (ref 70–99)
HBA1C MFR BLD: 6 % (ref 0–5.6)
POTASSIUM BLD-SCNC: 4.2 MMOL/L (ref 3.4–5.3)
PROT SERPL-MCNC: 8 G/DL (ref 6.8–8.8)
SODIUM SERPL-SCNC: 135 MMOL/L (ref 133–144)
TSH SERPL DL<=0.005 MIU/L-ACNC: 1.49 MU/L (ref 0.4–4)

## 2022-03-08 PROCEDURE — 36415 COLL VENOUS BLD VENIPUNCTURE: CPT | Performed by: NURSE PRACTITIONER

## 2022-03-08 PROCEDURE — 83036 HEMOGLOBIN GLYCOSYLATED A1C: CPT | Performed by: NURSE PRACTITIONER

## 2022-03-08 PROCEDURE — 80053 COMPREHEN METABOLIC PANEL: CPT | Performed by: NURSE PRACTITIONER

## 2022-03-08 PROCEDURE — 84443 ASSAY THYROID STIM HORMONE: CPT | Performed by: NURSE PRACTITIONER

## 2022-03-08 PROCEDURE — 99214 OFFICE O/P EST MOD 30 MIN: CPT | Performed by: NURSE PRACTITIONER

## 2022-03-08 RX ORDER — OMEGA-3/DHA/EPA/FISH OIL 60 MG-90MG
CAPSULE ORAL
COMMUNITY
End: 2022-04-12

## 2022-03-08 RX ORDER — LOSARTAN POTASSIUM 25 MG/1
25 TABLET ORAL DAILY
Qty: 90 TABLET | Refills: 0 | Status: SHIPPED | OUTPATIENT
Start: 2022-03-08 | End: 2022-04-12

## 2022-03-08 ASSESSMENT — PAIN SCALES - GENERAL: PAINLEVEL: NO PAIN (0)

## 2022-03-08 NOTE — NURSING NOTE
Chief Complaint   Patient presents with     Hypertension       Initial /88 (BP Location: Right arm, Patient Position: Sitting, Cuff Size: Adult Large)   Pulse 84   Temp 97.6  F (36.4  C) (Tympanic)   Resp 20   Ht 1.829 m (6')   Wt 96.6 kg (213 lb)   SpO2 96%   BMI 28.89 kg/m   Estimated body mass index is 28.89 kg/m  as calculated from the following:    Height as of this encounter: 1.829 m (6').    Weight as of this encounter: 96.6 kg (213 lb).  Medication Reconciliation: complete  Adia Ta LPN

## 2022-03-08 NOTE — PROGRESS NOTES
"  Assessment & Plan     Benign essential hypertension  - losartan (COZAAR) 25 MG tablet; Take 1 tablet (25 mg) by mouth daily  - TSH with free T4 reflex; Future  - Hemoglobin A1c; Future  - TSH with free T4 reflex  - Hemoglobin A1c    Elevated glucose  - Hemoglobin A1c; Future  - Comprehensive metabolic panel (BMP + Alb, Alk Phos, ALT, AST, Total. Bili, TP); Future  - Hemoglobin A1c  - Comprehensive metabolic panel (BMP + Alb, Alk Phos, ALT, AST, Total. Bili, TP)    Ordering of each unique test  Prescription drug management     No LOS data to display   Time spent doing chart review, history and exam, documentation and further activities per the note       No follow-ups on file.    Solange Pretty, CNP  [facility] - DAVID Pang is a 61 year old who presents for the following health issues     HPI     Hypertension Follow-up  Takes metoprolol daily 100 mg. Still feels like he is tired a lot.     Do you check your blood pressure regularly outside of the clinic? No     Are you following a low salt diet? Yes    Are your blood pressures ever more than 140 on the top number (systolic) OR more   than 90 on the bottom number (diastolic), for example 140/90? No      How many servings of fruits and vegetables do you eat daily?  0-1    On average, how many sweetened beverages do you drink each day (Examples: soda, juice, sweet tea, etc.  Do NOT count diet or artificially sweetened beverages)?   0    How many days per week do you exercise enough to make your heart beat faster? 3 or less    How many minutes a day do you exercise enough to make your heart beat faster? 30 - 60    How many days per week do you miss taking your medication? 0    Anxiety Follow-Up  Not interested in mediations.     How are you doing with your anxiety since your last visit? Feeling of the \" hebe jebe's\"    Are you having other symptoms that might be associated with anxiety? No    Have you had a significant life event? " No     Are you feeling depressed? No    Do you have any concerns with your use of alcohol or other drugs? No    Social History     Tobacco Use     Smoking status: Never Smoker     Smokeless tobacco: Never Used   Substance Use Topics     Alcohol use: Yes     Drug use: No     MAHENDRA-7 SCORE 1/11/2021 3/29/2021 12/28/2021   Total Score 0 0 0     PHQ 12/28/2020 1/11/2021 12/28/2021   PHQ-9 Total Score 4 2 1   Q9: Thoughts of better off dead/self-harm past 2 weeks Not at all Not at all Not at all     Review of Systems   Constitutional, HEENT, cardiovascular, pulmonary, gi and gu systems are negative, except as otherwise noted.      Objective    /88 (BP Location: Right arm, Patient Position: Sitting, Cuff Size: Adult Large)   Pulse 84   Temp 97.6  F (36.4  C) (Tympanic)   Resp 20   Ht 1.829 m (6')   Wt 96.6 kg (213 lb)   SpO2 96%   BMI 28.89 kg/m    Body mass index is 28.89 kg/m .       Physical Exam   GENERAL: healthy, alert and no distress  EYES: Eyes grossly normal to inspection, PERRL and conjunctivae and sclerae normal  NECK: no adenopathy, no asymmetry, masses, or scars and thyroid normal to palpation  RESP: lungs clear to auscultation - no rales, rhonchi or wheezes  CV: regular rate and rhythm, normal S1 S2, no S3 or S4, no murmur, click or rub, no peripheral edema and peripheral pulses strong  NEURO: Normal strength and tone, mentation intact and speech normal    Results for orders placed or performed in visit on 03/08/22   TSH with free T4 reflex     Status: Normal   Result Value Ref Range    TSH 1.49 0.40 - 4.00 mU/L   Hemoglobin A1c     Status: Abnormal   Result Value Ref Range    Estimated Average Glucose 126 mg/dL    Hemoglobin A1C 6.0 (H) 0.0 - 5.6 %   Comprehensive metabolic panel (BMP + Alb, Alk Phos, ALT, AST, Total. Bili, TP)     Status: Abnormal   Result Value Ref Range    Sodium 135 133 - 144 mmol/L    Potassium 4.2 3.4 - 5.3 mmol/L    Chloride 106 94 - 109 mmol/L    Carbon Dioxide (CO2) 24  20 - 32 mmol/L    Anion Gap 5 3 - 14 mmol/L    Urea Nitrogen 16 7 - 30 mg/dL    Creatinine 0.88 0.66 - 1.25 mg/dL    Calcium 9.7 8.5 - 10.1 mg/dL    Glucose 102 (H) 70 - 99 mg/dL    Alkaline Phosphatase 58 40 - 150 U/L    AST 16 0 - 45 U/L    ALT 52 0 - 70 U/L    Protein Total 8.0 6.8 - 8.8 g/dL    Albumin 4.3 3.4 - 5.0 g/dL    Bilirubin Total 0.7 0.2 - 1.3 mg/dL    GFR Estimate >90 >60 mL/min/1.73m2

## 2022-03-14 ENCOUNTER — OFFICE VISIT (OUTPATIENT)
Dept: FAMILY MEDICINE | Facility: OTHER | Age: 62
End: 2022-03-14
Attending: NURSE PRACTITIONER
Payer: COMMERCIAL

## 2022-03-14 ENCOUNTER — TELEPHONE (OUTPATIENT)
Dept: FAMILY MEDICINE | Facility: OTHER | Age: 62
End: 2022-03-14
Payer: COMMERCIAL

## 2022-03-14 VITALS
DIASTOLIC BLOOD PRESSURE: 84 MMHG | SYSTOLIC BLOOD PRESSURE: 140 MMHG | TEMPERATURE: 98.1 F | BODY MASS INDEX: 27.8 KG/M2 | WEIGHT: 205 LBS | HEART RATE: 80 BPM | OXYGEN SATURATION: 96 %

## 2022-03-14 DIAGNOSIS — R73.03 PREDIABETES: ICD-10-CM

## 2022-03-14 DIAGNOSIS — I10 BENIGN ESSENTIAL HYPERTENSION: ICD-10-CM

## 2022-03-14 DIAGNOSIS — R25.1 TREMOR: Primary | ICD-10-CM

## 2022-03-14 PROCEDURE — 99214 OFFICE O/P EST MOD 30 MIN: CPT | Performed by: NURSE PRACTITIONER

## 2022-03-14 RX ORDER — ESCITALOPRAM OXALATE 20 MG/1
20 TABLET ORAL DAILY
COMMUNITY
End: 2022-04-12

## 2022-03-14 ASSESSMENT — ANXIETY QUESTIONNAIRES
5. BEING SO RESTLESS THAT IT IS HARD TO SIT STILL: NOT AT ALL
IF YOU CHECKED OFF ANY PROBLEMS ON THIS QUESTIONNAIRE, HOW DIFFICULT HAVE THESE PROBLEMS MADE IT FOR YOU TO DO YOUR WORK, TAKE CARE OF THINGS AT HOME, OR GET ALONG WITH OTHER PEOPLE: SOMEWHAT DIFFICULT
3. WORRYING TOO MUCH ABOUT DIFFERENT THINGS: SEVERAL DAYS
2. NOT BEING ABLE TO STOP OR CONTROL WORRYING: NOT AT ALL
1. FEELING NERVOUS, ANXIOUS, OR ON EDGE: NOT AT ALL
6. BECOMING EASILY ANNOYED OR IRRITABLE: SEVERAL DAYS
GAD7 TOTAL SCORE: 2
7. FEELING AFRAID AS IF SOMETHING AWFUL MIGHT HAPPEN: NOT AT ALL

## 2022-03-14 ASSESSMENT — PATIENT HEALTH QUESTIONNAIRE - PHQ9
SUM OF ALL RESPONSES TO PHQ QUESTIONS 1-9: 6
5. POOR APPETITE OR OVEREATING: NOT AT ALL

## 2022-03-14 ASSESSMENT — PAIN SCALES - GENERAL: PAINLEVEL: NO PAIN (0)

## 2022-03-14 NOTE — TELEPHONE ENCOUNTER
----- Message from Pamela M. Lechevalier, LPN sent at 3/9/2022 11:03 AM CST -----  Patient notified of results states does exercise and watches what eats did offer DE patient not sure stated if pcp would like him to come in for another visit to discus pre dm please advise he has questions on DM

## 2022-03-14 NOTE — TELEPHONE ENCOUNTER
Call placed to patient to discuss A1C. Encouraged goal of BMI < 25. Mediterranean diet briefly discussed as well as calorie/nutrient apps to log actual intake. He already exercises a lot so this is less of an issue.   He scheduled visit for today with me to discuss meds. He has restarted escitalopram and would like to discuss this.

## 2022-03-14 NOTE — PATIENT INSTRUCTIONS
Get a blood pressure cuff (arm and automatic). Take about daily    Continue your losartan and metoprolol.     Stop the escitalopram for now.     Referral in for neurology for your right hand tremor.    Follow up in  1 month   Patient Education     Checking Your Blood Pressure  Step-by-Step    Fadi last reviewed this educational content on 4/1/2020 2000-2021 [employer]. All rights reserved. This information is not intended as a substitute for professional medical care. Always follow your healthcare professional's instructions.

## 2022-03-14 NOTE — PROGRESS NOTES
Assessment & Plan     1. Tremor  Through our discussion in clinic you primarily restarted the lexapro for your internal sensation of jitteriness and right hand tremor. Your screenings for anxiety and depression (MAHENDRA/PHQ9) indicate possible mild depression. You have indicated you are not troubled by your current depression-like symtpoms. Through shared decision making, the decision was made to hold on restarting the lexapro for now since it is not intended for hand tremors an may be leading to some side effects at the dose you started back on. We will work on dialing in your blood pressure first with the losartan. Follow up in 1 month for BP. I do want you to follow up with neurology for your hand tremor and sensation of internal jittery sensation.  - Adult Neurology  Referral    2. Benign essential hypertension  Continue with current losartan dose. It is a bit too soon to increase this yet. Continue to work on reducing salt in your diet. After you get the automatic blood pressure (arm) monitor take BP about every day. Bring these values with you in 1 month.      BP Readings from Last 3 Encounters:   03/14/22 (!) 140/84   03/08/22 138/88   03/04/22 (!) 144/88     Prediabetes  Work on weight loss. A good place to start is to set a goal of about 5% over 3 months. Continue with your active lifestyle. Recheck A1C in 6-12 months.      Prescription drug management  No LOS data to display   Time spent doing chart review, history and exam, documentation and further activities per the note       Return in about 1 month (around 4/14/2022), or if symptoms worsen or fail to improve.    Solange Pretty, CNP  [facility] - BARRETT Pang is a 61 year old who presents to discuss A1C results and restarting Lexapro.    HPI     Concern- Prediabetes  Onset: Follow up.  Description: labs done 3/8 A1c 6.0  Intensity: mild  Progression of Symptoms:  same  Accompanying Signs & Symptoms: here to discuss  medications   Previous history of similar problem: none   Precipitating factors:        Worsened by: none   Alleviating factors:        Improved by: none   Therapies tried and outcome: None    Anxiety  Started having some anxiety-like symptoms about 1.5 weeks ago which he is describing as internal jitters. Feels jittery and concerned about a predominant right hand shakiness, more in the AM than in the afternoon and evening. He reports the hand shaking is not completely new but has been increasing steadily. He has also been experiencing hot/cold feelings without any history of fevers.     He started retaking lexapro again after 6 months off primarily for the sensation of jitteriness and right hand tremor.  He started at his previous dose of 20 mg.  Since restarting he has been feeling light headed and states his feet feel light.     Right hand tremor: Started about 2 years ago. Believes he may have stretch a nerve at work or something. He does not drink anymore. 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 Remote history of about 1 year with 4 beers per day at that time.    MAHENDRA-7 SCORE 3/29/2021 12/28/2021 3/14/2022   Total Score 0 0 2     PHQ 1/11/2021 12/28/2021 3/14/2022   PHQ-9 Total Score 2 1 6   Q9: Thoughts of better off dead/self-harm past 2 weeks Not at all Not at all Not at all       Hypertension  I did see Bird very recently 3/8/22. Since then he did start the losartan, which was after his symptoms of jitteriness started. He is tolerating the losartan and metoprolol well. Blood pressure still a bit elevated today.       Review of Systems   Constitutional, HEENT, cardiovascular, pulmonary, gi and gu systems are negative, except as otherwise noted.      Objective    BP (!) 140/84   Pulse 80   Temp 98.1  F (36.7  C) (Tympanic)   Wt 93 kg (205 lb)   SpO2 96%   BMI 27.80 kg/m    Body mass index is 27.8 kg/m .     Physical Exam   GENERAL: healthy, alert and no distress  EYES: Eyes grossly  normal to inspection, PERRL and conjunctivae and sclerae normal  RESP: lungs clear to auscultation - no rales, rhonchi or wheezes  CV: regular rate and rhythm, normal S1 S2, no S3 or S4, no murmur, click or rub, no peripheral edema and peripheral pulses strong  NEURO: tremor of right hand predominantly at rest. Tremor improves slightly with task such as writing but is still present. Sensory exam grossly normal, light touch and pinprick normal to upper extremities, mentation intact, oriented times 4, speech normal, cranial nerves 2-12 intact and gait normal including heel/toe/tandem walking      No results found for any visits on 03/14/22.

## 2022-03-15 ASSESSMENT — ANXIETY QUESTIONNAIRES: GAD7 TOTAL SCORE: 2

## 2022-03-18 PROBLEM — R25.1 TREMOR: Status: ACTIVE | Noted: 2022-03-18

## 2022-03-18 PROBLEM — R73.03 PREDIABETES: Status: ACTIVE | Noted: 2022-03-18

## 2022-03-20 ENCOUNTER — TRANSFERRED RECORDS (OUTPATIENT)
Dept: HEALTH INFORMATION MANAGEMENT | Facility: CLINIC | Age: 62
End: 2022-03-20

## 2022-03-22 ENCOUNTER — TELEPHONE (OUTPATIENT)
Dept: FAMILY MEDICINE | Facility: OTHER | Age: 62
End: 2022-03-22
Payer: COMMERCIAL

## 2022-03-22 ENCOUNTER — TRANSFERRED RECORDS (OUTPATIENT)
Dept: HEALTH INFORMATION MANAGEMENT | Facility: CLINIC | Age: 62
End: 2022-03-22

## 2022-03-22 NOTE — TELEPHONE ENCOUNTER
Reason for ER/UC visit: elevated BP    New or Worsening symptoms:  better     Prescription Received/Picked up from Pharmacy?:   hydrALAZINE (Apresoline) 10 MG tablet   Take 1 Tablet by mouth two times a day. 14 Tablet   0 03/22/2      Any questions regarding your prescription?:     Follow-up Results or Labs that are pending: none pending    Do you have any questions or concerns?: need's appointment tomorrow    ER Recommends Follow-up By: with provider    RN Recommendations: Thank you for your time, if you start feeling worse, or have any further questions, please feel free to contact us again at [phone].  If needing immediate medical attention at any time please call 911/Go to the ER.

## 2022-03-22 NOTE — PROGRESS NOTES
Assessment & Plan     Benign essential hypertension  -increase losartan to 50 mg every morning (2 tabs)  -Increase metoprolol to 200 mg (2 tabs) in the morning.   - limit salt  - Follow up in office next week. We may need to increase this further.       Anxiety  - We are going to now restart your lexapro (escitalopram) but at 10 mg (1/2 tab) x 1 week and then increase to 20 mg daily after that. You have this at home. It will take weeks- months to take full effect.   - Buspar (buspirone) start 5 mg twice a day.   - busPIRone (BUSPAR) 5 MG tablet; Take 1 tablet (5 mg) by mouth 2 times daily    Tremor  I have ordered a new referral to [facility]. Call your insurance first. They do come to the [place].   We disucussed option to get an MRI of brain prior to visit. Bird became very anxious at the mention of this and would like to wait on this and discuss it later. Risk of waiting discussed briefly. We will table this and defer to neurology.  - Adult Neurology  Referral    Insomnia, unspecified type  Okay to restart trazodone at 25-50 mg (1/2-1 tab at night)  - traZODone (DESYREL) 50 MG tablet; Take 0.5-1 tablets (25-50 mg) by mouth At Bedtime    Prescription drug management  I spent at least 45 minutes on the day of the visit.   Time spent doing chart review, history and exam, documentation and further activities per the note       Return in about 1 week (around 3/30/2022).    Solange Pretty, CNP  [facility]    Subjective   Bird is a 61 year old who presents for the following health issues     HPI     ED/UC Followup:    Facility:  [facility]  Date of visit: 3/20/22 & 3/22/22   Reason for visit: hypertension & anxiety  At our last visit he was having some dizziness after abruptly re-starting lexapro at 20 mg. He restarted this independently primarily for his hand tremor. We held off on continuing this with concerns that it was causing his dizziness. Since that visit,  he has been checking his BP frequently with systolic ranging from 122-198 and diastolic ranging from . He has been into the ER twice with hypertension and anxiety. Currently, hydralazine 10 mg BID (from ER); 25 mg losartan; metoprolol 100 mg daily. He reports his anxiety is very high. Stressors include his high blood pressure and the impending summer travel season. He is self employed, running a summer tourism company.     Previous testing: Negative Stress test on 12/17/21; negative ER cardiac work-up; A1C elevated to 6.0 earlier this month; TSH normal this month;     Alcohol and drug use : denies any current or recent use   Discussed lorazepam temporarily as we onboard lexapro. Bird declines this due to his need to drive for work. We discussed Buspar and he is agreeable to this option    Insomnia: Bird is concerned about his sleep. Has been taking melatonin and not noticing much difference. Has taken trazodone in the past at 50 mg per report and it worked well but felt even that may have been a bit strong at the time. He would like to try this again but at a lower dosage.        Review of Systems   Constitutional, HEENT, cardiovascular, pulmonary, gi and gu systems are negative, except as otherwise noted.        Objective    Initial BP was 166/104. This did improve during the visit as noted below.  BP (!) 142/94 (BP Location: Left arm, Patient Position: Sitting, Cuff Size: Adult Large)   Pulse 84   Temp 97.3  F (36.3  C) (Tympanic)   Ht 1.829 m (6')   Wt 94.3 kg (208 lb)   SpO2 94%   BMI 28.21 kg/m    Body mass index is 28.21 kg/m .     Physical Exam   GENERAL: healthy, alert and no distress  EYES: Eyes grossly normal to inspection, PERRL and conjunctivae and sclerae normal  NECK: no adenopathy, no asymmetry, masses, or scars and thyroid normal to palpation  NECK: no adenopathy, no asymmetry, masses, or scars, thyroid normal to palpation and no carotid bruits  RESP: lungs clear to auscultation - no  rales, rhonchi or wheezes  CV: regular rate and rhythm, normal S1 S2, no S3 or S4, no murmur, click or rub, no peripheral edema and peripheral pulses strong  NEURO: Normal strength and tone, tremor of right hand only improves with purposeful movement but does not fully resolve, sensory exam grossly normal, mentation intact, oriented times 4, speech normal, cranial nerves 2-12 intact and gait normal including heel/toe/tandem walking  PSYCH: mentation appears normal, anxious, judgement and insight intact and appearance well groomed. Repeats himself and is fixated on the number of pills taken each day    No results found for any visits on 03/23/22.

## 2022-03-23 ENCOUNTER — OFFICE VISIT (OUTPATIENT)
Dept: FAMILY MEDICINE | Facility: OTHER | Age: 62
End: 2022-03-23
Attending: NURSE PRACTITIONER
Payer: COMMERCIAL

## 2022-03-23 VITALS
SYSTOLIC BLOOD PRESSURE: 142 MMHG | TEMPERATURE: 97.3 F | WEIGHT: 208 LBS | HEIGHT: 72 IN | OXYGEN SATURATION: 94 % | BODY MASS INDEX: 28.17 KG/M2 | DIASTOLIC BLOOD PRESSURE: 94 MMHG | HEART RATE: 84 BPM

## 2022-03-23 DIAGNOSIS — G47.00 INSOMNIA, UNSPECIFIED TYPE: ICD-10-CM

## 2022-03-23 DIAGNOSIS — F41.9 ANXIETY: ICD-10-CM

## 2022-03-23 DIAGNOSIS — I10 BENIGN ESSENTIAL HYPERTENSION: Primary | ICD-10-CM

## 2022-03-23 DIAGNOSIS — R25.1 TREMOR: ICD-10-CM

## 2022-03-23 PROCEDURE — 99215 OFFICE O/P EST HI 40 MIN: CPT | Performed by: NURSE PRACTITIONER

## 2022-03-23 RX ORDER — HYDRALAZINE HYDROCHLORIDE 10 MG/1
10 TABLET, FILM COATED ORAL
COMMUNITY
Start: 2022-03-22 | End: 2022-04-12

## 2022-03-23 RX ORDER — BUSPIRONE HYDROCHLORIDE 5 MG/1
5 TABLET ORAL 2 TIMES DAILY
Qty: 180 TABLET | Refills: 0 | Status: SHIPPED | OUTPATIENT
Start: 2022-03-23 | End: 2022-04-12

## 2022-03-23 RX ORDER — TRAZODONE HYDROCHLORIDE 50 MG/1
25-50 TABLET, FILM COATED ORAL AT BEDTIME
Qty: 90 TABLET | Refills: 0 | Status: SHIPPED | OUTPATIENT
Start: 2022-03-23 | End: 2022-06-06

## 2022-03-23 ASSESSMENT — PAIN SCALES - GENERAL: PAINLEVEL: NO PAIN (0)

## 2022-03-23 NOTE — PATIENT INSTRUCTIONS
Hypertension/  High blood pressure  -increase losartan to 50 mg every morning (2 tabs)  -Increase metoprolol to 200 mg (2 tabs) in the morning.   - limit salt  - Follow up in office next week. We may need to increase this further.       Anxiety  - We are going to now restart your lexapro (escitalopram) but at 10 mg (1/2 tab) x 1 week and then increase to 20 mg daily after that. You have this at home. It will take weeks- months to take full effect.   - Buspar (buspirone) start 5 mg twice a day.     Insomnia  Okay to restart trazodone at 25-50 mg (1/2-1 tab at night)    Tremor   I have ordered a new referral to [facility]. Call your insurance first. They do come to the [place].

## 2022-03-23 NOTE — NURSING NOTE
Chief Complaint   Patient presents with     ER F/U       Initial BP (!) 166/104 (BP Location: Right arm, Patient Position: Sitting, Cuff Size: Adult Large)   Pulse 84   Temp 97.3  F (36.3  C) (Tympanic)   Ht 1.829 m (6')   Wt 94.3 kg (208 lb)   SpO2 94%   BMI 28.21 kg/m   Estimated body mass index is 28.21 kg/m  as calculated from the following:    Height as of this encounter: 1.829 m (6').    Weight as of this encounter: 94.3 kg (208 lb).  Medication Reconciliation: complete  Adia Ta LPN

## 2022-03-24 NOTE — PROGRESS NOTES
"  Assessment & Plan   Benign essential hypertension  Tolerating blood pressure medications well. Encouraged to continue taking losartan and metoprolol. May split dose between AM and PM.     Anxiety  Discussed to consider buspar again without the lexapo. He will consider this option but states he will probably not restart either.     Tremor  Follow up with neurology.      Follow up in 1 month.   Prescription drug management      Solange Pretty, CNP  [facility] [place] SHALINI Pang is a 61 year old who presents for the following health issues    HPI     Hypertension Follow-up  He is interested in decreasing the dosage of his htn medications. We discussed that it is necessary to continue on these medications. Okay to split dosages between AM and PM. Losartan 25 mg twice a day.    He has stopped checking his BP since it causes him distress.     Do you check your blood pressure regularly outside of the clinic? No     Are you following a low salt diet? Yes    Are your blood pressures ever more than 140 on the top number (systolic) OR more   than 90 on the bottom number (diastolic), for example 140/90? Yes    Anxiety Follow-Up  Tried buspar and lexapro. Took one dose and did not like how it made him feel. He stopped taking hem and doing deep breathing exercises instead. \"For the most part, my anxiety is gone\".     How are you doing with your depression since your last visit? Improved    How are you doing with your anxiety since your last visit?  Improved     Are you having other symptoms that might be associated with depression or anxiety? No    Have you had a significant life event? No     Do you have any concerns with your use of alcohol or other drugs? No     Tremor  Has appointment for neurology middle of May.     Social History     Tobacco Use     Smoking status: Never Smoker     Smokeless tobacco: Never Used   Substance Use Topics     Alcohol use: Yes     Drug use: No     PHQ 12/28/2021 " 3/14/2022 3/29/2022   PHQ-9 Total Score 1 6 5   Q9: Thoughts of better off dead/self-harm past 2 weeks Not at all Not at all Not at all     MAHENDRA-7 SCORE 12/28/2021 3/14/2022 3/29/2022   Total Score 0 2 4       Suicide Assessment Five-step Evaluation and Treatment (SAFE-T)    How many servings of fruits and vegetables do you eat daily?  2-3    On average, how many sweetened beverages do you drink each day (Examples: soda, juice, sweet tea, etc.  Do NOT count diet or artificially sweetened beverages)?   0    How many days per week do you exercise enough to make your heart beat faster? 3 or less    How many minutes a day do you exercise enough to make your heart beat faster? 30 - 60    How many days per week do you miss taking your medication? 0    Review of Systems   Constitutional, HEENT, cardiovascular, pulmonary, gi and gu systems are negative, except as otherwise noted.      Objective    BP (!) 142/96 (BP Location: Left arm, Patient Position: Sitting, Cuff Size: Adult Large)   Pulse 76   Temp 97.3  F (36.3  C) (Tympanic)   Resp 16   Ht 1.829 m (6')   Wt 94.3 kg (208 lb)   SpO2 97%   BMI 28.21 kg/m    Body mass index is 28.21 kg/m .     Physical Exam   GENERAL: healthy, alert and no distress  EYES: Eyes grossly normal to inspection, PERRL and conjunctivae and sclerae normal  NECK: no adenopathy, no asymmetry, masses, or scars and thyroid normal to palpation  RESP: lungs clear to auscultation - no rales, rhonchi or wheezes  CV: regular rate and rhythm, normal S1 S2, no S3 or S4, no murmur, click or rub, no peripheral edema and peripheral pulses strong  NEURO: tremor right hand-unchanged, mentation intact, cranial nerves 2-12 intact.  PSYCH: mentation appears normal, affect flat, judgement and insight intact and appearance well groomed    No results found for any visits on 03/29/22.

## 2022-03-29 ENCOUNTER — OFFICE VISIT (OUTPATIENT)
Dept: FAMILY MEDICINE | Facility: OTHER | Age: 62
End: 2022-03-29
Attending: NURSE PRACTITIONER
Payer: COMMERCIAL

## 2022-03-29 VITALS
DIASTOLIC BLOOD PRESSURE: 96 MMHG | OXYGEN SATURATION: 97 % | RESPIRATION RATE: 16 BRPM | WEIGHT: 208 LBS | SYSTOLIC BLOOD PRESSURE: 142 MMHG | BODY MASS INDEX: 28.17 KG/M2 | HEIGHT: 72 IN | HEART RATE: 76 BPM | TEMPERATURE: 97.3 F

## 2022-03-29 DIAGNOSIS — I10 BENIGN ESSENTIAL HYPERTENSION: ICD-10-CM

## 2022-03-29 DIAGNOSIS — R25.1 TREMOR: ICD-10-CM

## 2022-03-29 DIAGNOSIS — F41.9 ANXIETY: Primary | ICD-10-CM

## 2022-03-29 PROCEDURE — 99213 OFFICE O/P EST LOW 20 MIN: CPT | Performed by: NURSE PRACTITIONER

## 2022-03-29 ASSESSMENT — PAIN SCALES - GENERAL: PAINLEVEL: NO PAIN (0)

## 2022-03-29 ASSESSMENT — PATIENT HEALTH QUESTIONNAIRE - PHQ9
SUM OF ALL RESPONSES TO PHQ QUESTIONS 1-9: 5
5. POOR APPETITE OR OVEREATING: SEVERAL DAYS

## 2022-03-29 ASSESSMENT — ANXIETY QUESTIONNAIRES
5. BEING SO RESTLESS THAT IT IS HARD TO SIT STILL: NOT AT ALL
IF YOU CHECKED OFF ANY PROBLEMS ON THIS QUESTIONNAIRE, HOW DIFFICULT HAVE THESE PROBLEMS MADE IT FOR YOU TO DO YOUR WORK, TAKE CARE OF THINGS AT HOME, OR GET ALONG WITH OTHER PEOPLE: NOT DIFFICULT AT ALL
3. WORRYING TOO MUCH ABOUT DIFFERENT THINGS: SEVERAL DAYS
7. FEELING AFRAID AS IF SOMETHING AWFUL MIGHT HAPPEN: NOT AT ALL
GAD7 TOTAL SCORE: 4
6. BECOMING EASILY ANNOYED OR IRRITABLE: NOT AT ALL
1. FEELING NERVOUS, ANXIOUS, OR ON EDGE: SEVERAL DAYS
2. NOT BEING ABLE TO STOP OR CONTROL WORRYING: SEVERAL DAYS

## 2022-03-29 NOTE — NURSING NOTE
Chief Complaint   Patient presents with     Hypertension     Anxiety       Initial BP (!) 142/96 (BP Location: Left arm, Patient Position: Sitting, Cuff Size: Adult Large)   Pulse 76   Temp 97.3  F (36.3  C) (Tympanic)   Resp 16   Ht 1.829 m (6')   Wt 94.3 kg (208 lb)   SpO2 97%   BMI 28.21 kg/m   Estimated body mass index is 28.21 kg/m  as calculated from the following:    Height as of this encounter: 1.829 m (6').    Weight as of this encounter: 94.3 kg (208 lb).  Medication Reconciliation: complete  Adia Ta LPN

## 2022-03-30 ASSESSMENT — ANXIETY QUESTIONNAIRES: GAD7 TOTAL SCORE: 4

## 2022-04-01 ENCOUNTER — TELEPHONE (OUTPATIENT)
Dept: FAMILY MEDICINE | Facility: OTHER | Age: 62
End: 2022-04-01
Payer: COMMERCIAL

## 2022-04-01 NOTE — TELEPHONE ENCOUNTER
Call from patient requesting appt with Dr. Leal for Second Opinion on BP concerns.     Patient is requesting appointment for next week with Dr. Leal.     Please reach out to patient at [phone]

## 2022-04-01 NOTE — TELEPHONE ENCOUNTER
Attempt # 1  Outcome: Talked with Patient    Comment: called and scheduled pt for first available long appt on 4/12

## 2022-04-04 NOTE — TELEPHONE ENCOUNTER
Please notify patient Dr. Leal out. Scheduled for 4/12 as first available appt. Offer waitlist for any cancellations or follow up with PCP

## 2022-04-04 NOTE — TELEPHONE ENCOUNTER
Patient returning call requesting an appointment sooner than 4/12/22.    Patient can be reached at [phone].

## 2022-04-12 ENCOUNTER — OFFICE VISIT (OUTPATIENT)
Dept: INTERNAL MEDICINE | Facility: OTHER | Age: 62
End: 2022-04-12
Attending: INTERNAL MEDICINE
Payer: COMMERCIAL

## 2022-04-12 VITALS
WEIGHT: 210.5 LBS | TEMPERATURE: 98.3 F | BODY MASS INDEX: 28.55 KG/M2 | HEART RATE: 77 BPM | OXYGEN SATURATION: 97 % | SYSTOLIC BLOOD PRESSURE: 140 MMHG | DIASTOLIC BLOOD PRESSURE: 98 MMHG

## 2022-04-12 DIAGNOSIS — I10 BENIGN ESSENTIAL HYPERTENSION: ICD-10-CM

## 2022-04-12 PROCEDURE — 99214 OFFICE O/P EST MOD 30 MIN: CPT | Performed by: INTERNAL MEDICINE

## 2022-04-12 RX ORDER — LOSARTAN POTASSIUM 100 MG/1
100 TABLET ORAL DAILY
Qty: 30 TABLET | Refills: 3 | Status: SHIPPED | OUTPATIENT
Start: 2022-04-12 | End: 2022-05-26

## 2022-04-12 ASSESSMENT — PAIN SCALES - GENERAL: PAINLEVEL: NO PAIN (0)

## 2022-04-12 NOTE — PROGRESS NOTES
Assessment & Plan   Problem List Items Addressed This Visit        Circulatory    Benign essential hypertension    Relevant Medications    losartan (COZAAR) 100 MG tablet         Continue Toprol  mg BID and increase Losartan to 100 mg daily.     30 minutes spent on the date of the encounter doing chart review, review of test results, interpretation of tests, patient visit and documentation            No follow-ups on file.    Jason Leal, DO  [facility]    Mayda Pang is a 61 year old who presents for the following health issues     HPI       Bird presents today for HTN.   It appears his BP was controlled at one point.  Now it has been elevated again.  He is currently on Toprol  mg BID and Losartan 25 mg BID.  He reports some light headed sensation with mild HA also.      Hypertension Follow-up      Do you check your blood pressure regularly outside of the clinic? Yes     Are you following a low salt diet? Yes    Are your blood pressures ever more than 140 on the top number (systolic) OR more   than 90 on the bottom number (diastolic), for example 140/90? Yes      How many servings of fruits and vegetables do you eat daily?  2-3    On average, how many sweetened beverages do you drink each day (Examples: soda, juice, sweet tea, etc.  Do NOT count diet or artificially sweetened beverages)?   0    How many days per week do you exercise enough to make your heart beat faster? 3 or less    How many minutes a day do you exercise enough to make your heart beat faster? 10 - 19    How many days per week do you miss taking your medication? 0        Review of Systems   Constitutional, HEENT, cardiovascular, pulmonary, gi and gu systems are negative, except as otherwise noted.      Objective    BP (!) 140/98 (BP Location: Left arm, Patient Position: Chair, Cuff Size: Adult Large)   Pulse 77   Temp 98.3  F (36.8  C)   Wt 95.5 kg (210 lb 8 oz)   SpO2 97%   BMI 28.55 kg/m     Body mass index is 28.55 kg/m .  Physical Exam   GENERAL: healthy, alert and no distress  RESP: lungs clear to auscultation - no rales, rhonchi or wheezes  CV: regular rate and rhythm, normal S1 S2, no S3 or S4, no murmur, click or rub, no peripheral edema and peripheral pulses strong  MS: no gross musculoskeletal defects noted, no edema  PSYCH: mentation appears normal, affect normal/bright    Office Visit on 03/08/2022   Component Date Value Ref Range Status     TSH 03/08/2022 1.49  0.40 - 4.00 mU/L Final     Estimated Average Glucose 03/08/2022 126  mg/dL Final     Hemoglobin A1C 03/08/2022 6.0 (A) 0.0 - 5.6 % Final    Normal <5.7%   Prediabetes 5.7-6.4%    Diabetes 6.5% or higher     Note: Adopted from ADA consensus guidelines.     Sodium 03/08/2022 135  133 - 144 mmol/L Final     Potassium 03/08/2022 4.2  3.4 - 5.3 mmol/L Final     Chloride 03/08/2022 106  94 - 109 mmol/L Final     Carbon Dioxide (CO2) 03/08/2022 24  20 - 32 mmol/L Final     Anion Gap 03/08/2022 5  3 - 14 mmol/L Final     Urea Nitrogen 03/08/2022 16  7 - 30 mg/dL Final     Creatinine 03/08/2022 0.88  0.66 - 1.25 mg/dL Final     Calcium 03/08/2022 9.7  8.5 - 10.1 mg/dL Final     Glucose 03/08/2022 102 (A) 70 - 99 mg/dL Final     Alkaline Phosphatase 03/08/2022 58  40 - 150 U/L Final     AST 03/08/2022 16  0 - 45 U/L Final     ALT 03/08/2022 52  0 - 70 U/L Final     Protein Total 03/08/2022 8.0  6.8 - 8.8 g/dL Final     Albumin 03/08/2022 4.3  3.4 - 5.0 g/dL Final     Bilirubin Total 03/08/2022 0.7  0.2 - 1.3 mg/dL Final     GFR Estimate 03/08/2022 >90  >60 mL/min/1.73m2 Final    Effective December 21, 2021 eGFRcr in adults is calculated using the 2021 CKD-EPI creatinine equation which includes age and gender (Nelsy et al., NEJM, DOI: 10.1056/DQHMqv0226294)     No results found for any visits on 04/12/22.  No results found for this or any previous visit (from the past 24 hour(s)).

## 2022-04-12 NOTE — NURSING NOTE
Chief Complaint   Patient presents with     Consult     Would like to re-establish with Dr. Leal       Initial BP (!) 140/98 (BP Location: Left arm, Patient Position: Chair, Cuff Size: Adult Large)   Pulse 77   Temp 98.3  F (36.8  C)   Wt 95.5 kg (210 lb 8 oz)   SpO2 97%   BMI 28.55 kg/m   Estimated body mass index is 28.55 kg/m  as calculated from the following:    Height as of 3/29/22: 1.829 m (6').    Weight as of this encounter: 95.5 kg (210 lb 8 oz).  Medication Reconciliation: complete  Ronda Reeves

## 2022-04-13 RX ORDER — METOPROLOL SUCCINATE 100 MG/1
TABLET, EXTENDED RELEASE ORAL
Qty: 180 TABLET | Refills: 0 | Status: SHIPPED | OUTPATIENT
Start: 2022-04-13 | End: 2022-05-26

## 2022-04-13 NOTE — TELEPHONE ENCOUNTER
Pt calling and asking for #90 day supply.    Metoprolol 100 mg po BID per patient.    Pharmacy request is for once daily.Please advise on directions on below RX request. Change to BID?      Metoprolol   Last Written Prescription Date:  1.25.2022  Last Fill Quantity: 90,   # refills: 0  Last Office Visit: 4.12.2022  Future Office visit:       Routing refill request to provider for review/approval because:  Clarify order needed.    Irene Luz RN

## 2022-05-02 ENCOUNTER — OFFICE VISIT (OUTPATIENT)
Dept: FAMILY MEDICINE | Facility: OTHER | Age: 62
End: 2022-05-02
Attending: INTERNAL MEDICINE
Payer: COMMERCIAL

## 2022-05-02 VITALS — HEART RATE: 74 BPM | DIASTOLIC BLOOD PRESSURE: 80 MMHG | SYSTOLIC BLOOD PRESSURE: 138 MMHG | OXYGEN SATURATION: 97 %

## 2022-05-02 DIAGNOSIS — I10 BENIGN ESSENTIAL HYPERTENSION: Primary | ICD-10-CM

## 2022-05-02 PROCEDURE — 99207 PR NO CHARGE NURSE ONLY: CPT

## 2022-05-05 ENCOUNTER — OFFICE VISIT (OUTPATIENT)
Dept: INTERNAL MEDICINE | Facility: OTHER | Age: 62
End: 2022-05-05
Attending: INTERNAL MEDICINE
Payer: COMMERCIAL

## 2022-05-05 VITALS
WEIGHT: 204 LBS | SYSTOLIC BLOOD PRESSURE: 144 MMHG | BODY MASS INDEX: 27.67 KG/M2 | TEMPERATURE: 96.8 F | OXYGEN SATURATION: 96 % | DIASTOLIC BLOOD PRESSURE: 96 MMHG | HEART RATE: 73 BPM

## 2022-05-05 DIAGNOSIS — F41.9 ANXIETY: Primary | ICD-10-CM

## 2022-05-05 PROCEDURE — 99214 OFFICE O/P EST MOD 30 MIN: CPT | Performed by: INTERNAL MEDICINE

## 2022-05-05 RX ORDER — SERTRALINE HYDROCHLORIDE 25 MG/1
25 TABLET, FILM COATED ORAL DAILY
Qty: 30 TABLET | Refills: 1 | Status: SHIPPED | OUTPATIENT
Start: 2022-05-05 | End: 2022-05-26

## 2022-05-05 ASSESSMENT — PAIN SCALES - GENERAL: PAINLEVEL: NO PAIN (1)

## 2022-05-05 NOTE — PROGRESS NOTES
Assessment & Plan   Problem List Items Addressed This Visit        Other    Anxiety - Primary    Relevant Medications    sertraline (ZOLOFT) 25 MG tablet             25 minutes spent on the date of the encounter doing chart review, review of test results, interpretation of tests, patient visit and documentation            No follow-ups on file.    Jason Leal, [facility]    Mayda Pang is a 61 year old who presents for the following health issues     HPI       Bird presents today for follow up.  He remains on Losartan and Toprol XL.  He is convinced he is having side effects from one of these medications.  Dry nose, numbness in arms and legs, trouble with grasp.  He is very pressured and does ask numerous questions today.  He will be having an appointment with neurology due to hand tremor.      Hypertension Follow-up      Do you check your blood pressure regularly outside of the clinic? Yes     Are you following a low salt diet? Yes    Are your blood pressures ever more than 140 on the top number (systolic) OR more   than 90 on the bottom number (diastolic), for example 140/90? Yes            Review of Systems   Constitutional, HEENT, cardiovascular, pulmonary, gi and gu systems are negative, except as otherwise noted.      Objective    BP (!) 144/96 (BP Location: Left arm, Patient Position: Chair, Cuff Size: Adult Large)   Pulse 73   Temp 96.8  F (36  C) (Tympanic)   Wt 92.5 kg (204 lb)   SpO2 96%   BMI 27.67 kg/m    Body mass index is 27.67 kg/m .  Physical Exam   GENERAL: alert and no distress  NEURO: Tremor in right hand  PSYCH: anxious and pressured    Office Visit on 03/08/2022   Component Date Value Ref Range Status     TSH 03/08/2022 1.49  0.40 - 4.00 mU/L Final     Estimated Average Glucose 03/08/2022 126  mg/dL Final     Hemoglobin A1C 03/08/2022 6.0 (A) 0.0 - 5.6 % Final    Normal <5.7%   Prediabetes 5.7-6.4%    Diabetes 6.5% or higher     Note: Adopted  from ADA consensus guidelines.     Sodium 03/08/2022 135  133 - 144 mmol/L Final     Potassium 03/08/2022 4.2  3.4 - 5.3 mmol/L Final     Chloride 03/08/2022 106  94 - 109 mmol/L Final     Carbon Dioxide (CO2) 03/08/2022 24  20 - 32 mmol/L Final     Anion Gap 03/08/2022 5  3 - 14 mmol/L Final     Urea Nitrogen 03/08/2022 16  7 - 30 mg/dL Final     Creatinine 03/08/2022 0.88  0.66 - 1.25 mg/dL Final     Calcium 03/08/2022 9.7  8.5 - 10.1 mg/dL Final     Glucose 03/08/2022 102 (A) 70 - 99 mg/dL Final     Alkaline Phosphatase 03/08/2022 58  40 - 150 U/L Final     AST 03/08/2022 16  0 - 45 U/L Final     ALT 03/08/2022 52  0 - 70 U/L Final     Protein Total 03/08/2022 8.0  6.8 - 8.8 g/dL Final     Albumin 03/08/2022 4.3  3.4 - 5.0 g/dL Final     Bilirubin Total 03/08/2022 0.7  0.2 - 1.3 mg/dL Final     GFR Estimate 03/08/2022 >90  >60 mL/min/1.73m2 Final    Effective December 21, 2021 eGFRcr in adults is calculated using the 2021 CKD-EPI creatinine equation which includes age and gender (Nelsy et al., NEJM, DOI: 10.1056/DEQNwp3188503)     No results found for any visits on 05/05/22.  No results found for this or any previous visit (from the past 24 hour(s)).

## 2022-05-05 NOTE — NURSING NOTE
Chief Complaint   Patient presents with     Hypertension       Initial BP (!) 144/96 (BP Location: Left arm, Patient Position: Chair, Cuff Size: Adult Large)   Pulse 73   Temp 96.8  F (36  C) (Tympanic)   Wt 92.5 kg (204 lb)   SpO2 96%   BMI 27.67 kg/m   Estimated body mass index is 27.67 kg/m  as calculated from the following:    Height as of 3/29/22: 1.829 m (6').    Weight as of this encounter: 92.5 kg (204 lb).  Medication Reconciliation: complete  EDOUARD BUSTILLOS LPN

## 2022-05-12 ENCOUNTER — TRANSFERRED RECORDS (OUTPATIENT)
Dept: HEALTH INFORMATION MANAGEMENT | Facility: CLINIC | Age: 62
End: 2022-05-12

## 2022-05-24 NOTE — PROGRESS NOTES
"  Assessment & Plan   Problem List Items Addressed This Visit        Circulatory    Benign essential hypertension    Relevant Medications    losartan (COZAAR) 100 MG tablet    metoprolol succinate ER (TOPROL XL) 100 MG 24 hr tablet       Other    Tremor - Primary    Anxiety    Relevant Medications    sertraline (ZOLOFT) 25 MG tablet             30 minutes spent on the date of the encounter doing chart review, review of test results, interpretation of tests, patient visit and documentation          Jason Leal, [facility]   Bird is a 61 year old who presents for the following health issues     HPI     Bird presents today for follow up.  He has a history of HTN and anxiety.  He was placed on Zoloft and is doing much better with his anxiety.   His BP is also under control.  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  His tremor has resolved.  He was also instructed to increase his Trazodone to 75 mg PO daily.       Hypertension Follow-up      Do you check your blood pressure regularly outside of the clinic? Yes     Are you following a low salt diet? No    Are your blood pressures ever more than 140 on the top number (systolic) OR more   than 90 on the bottom number (diastolic), for example 140/90? No    Anxiety Follow-Up    How are you doing with your anxiety since your last visit? improved    Are you having other symptoms that might be associated with anxiety? No    Have you had a significant life event? No     Are you feeling depressed? No    Do you have any concerns with your use of alcohol or other drugs? No    Social History     Tobacco Use     Smoking status: Never Smoker     Smokeless tobacco: Never Used   Substance Use Topics     Alcohol use: Yes     Drug use: No     MAHENDRA-7 SCORE 3/14/2022 3/29/2022 5/26/2022   Total Score 2 4 2     PHQ 12/28/2021 3/14/2022 3/29/2022   PHQ-9 Total Score 1 6 5   Q9: Thoughts of better off " dead/self-harm past 2 weeks Not at all Not at all Not at all     MAHENDRA-7  5/26/2022   1. Feeling nervous, anxious, or on edge 0   2. Not being able to stop or control worrying 0   3. Worrying too much about different things 1   4. Trouble relaxing 0   5. Being so restless that it is hard to sit still 0   6. Becoming easily annoyed or irritable 1   7. Feeling afraid, as if something awful might happen 0   MAHENDRA-7 Total Score 2   If you checked any problems, how difficult have they made it for you to do your work, take care of things at home, or get along with other people? -           Review of Systems   Constitutional, HEENT, cardiovascular, pulmonary, gi and gu systems are negative, except as otherwise noted.      Objective    /78 (BP Location: Left arm, Patient Position: Chair, Cuff Size: Adult Large)   Pulse 66   Temp 97.5  F (36.4  C) (Tympanic)   Wt 93.9 kg (207 lb)   SpO2 95%   BMI 28.07 kg/m    Body mass index is 28.07 kg/m .  Physical Exam   GENERAL: alert and no distress  RESP: lungs clear to auscultation - no rales, rhonchi or wheezes  CV: regular rate and rhythm, normal S1 S2, no S3 or S4, no murmur, click or rub, no peripheral edema and peripheral pulses strong  NEURO: No evident tremor   PSYCH: mentation appears normal, affect normal/bright    Office Visit on 03/08/2022   Component Date Value Ref Range Status     TSH 03/08/2022 1.49  0.40 - 4.00 mU/L Final     Estimated Average Glucose 03/08/2022 126  mg/dL Final     Hemoglobin A1C 03/08/2022 6.0 (A) 0.0 - 5.6 % Final    Normal <5.7%   Prediabetes 5.7-6.4%    Diabetes 6.5% or higher     Note: Adopted from ADA consensus guidelines.     Sodium 03/08/2022 135  133 - 144 mmol/L Final     Potassium 03/08/2022 4.2  3.4 - 5.3 mmol/L Final     Chloride 03/08/2022 106  94 - 109 mmol/L Final     Carbon Dioxide (CO2) 03/08/2022 24  20 - 32 mmol/L Final     Anion Gap 03/08/2022 5  3 - 14 mmol/L Final     Urea Nitrogen 03/08/2022 16  7 - 30 mg/dL Final      Creatinine 03/08/2022 0.88  0.66 - 1.25 mg/dL Final     Calcium 03/08/2022 9.7  8.5 - 10.1 mg/dL Final     Glucose 03/08/2022 102 (A) 70 - 99 mg/dL Final     Alkaline Phosphatase 03/08/2022 58  40 - 150 U/L Final     AST 03/08/2022 16  0 - 45 U/L Final     ALT 03/08/2022 52  0 - 70 U/L Final     Protein Total 03/08/2022 8.0  6.8 - 8.8 g/dL Final     Albumin 03/08/2022 4.3  3.4 - 5.0 g/dL Final     Bilirubin Total 03/08/2022 0.7  0.2 - 1.3 mg/dL Final     GFR Estimate 03/08/2022 >90  >60 mL/min/1.73m2 Final    Effective December 21, 2021 eGFRcr in adults is calculated using the 2021 CKD-EPI creatinine equation which includes age and gender (Nelsy et al., NEJM, DOI: 10.1056/NGTBur8007731)     No results found for any visits on 05/26/22.  No results found for this or any previous visit (from the past 24 hour(s)).

## 2022-05-26 ENCOUNTER — OFFICE VISIT (OUTPATIENT)
Dept: INTERNAL MEDICINE | Facility: OTHER | Age: 62
End: 2022-05-26
Attending: INTERNAL MEDICINE
Payer: COMMERCIAL

## 2022-05-26 VITALS
OXYGEN SATURATION: 95 % | WEIGHT: 207 LBS | HEART RATE: 66 BPM | SYSTOLIC BLOOD PRESSURE: 126 MMHG | DIASTOLIC BLOOD PRESSURE: 78 MMHG | TEMPERATURE: 97.5 F | BODY MASS INDEX: 28.07 KG/M2

## 2022-05-26 DIAGNOSIS — R25.1 TREMOR: Primary | ICD-10-CM

## 2022-05-26 DIAGNOSIS — F41.9 ANXIETY: ICD-10-CM

## 2022-05-26 DIAGNOSIS — I10 BENIGN ESSENTIAL HYPERTENSION: ICD-10-CM

## 2022-05-26 PROCEDURE — 99214 OFFICE O/P EST MOD 30 MIN: CPT | Performed by: INTERNAL MEDICINE

## 2022-05-26 RX ORDER — LOSARTAN POTASSIUM 100 MG/1
100 TABLET ORAL DAILY
Qty: 30 TABLET | Refills: 3 | Status: SHIPPED | OUTPATIENT
Start: 2022-05-26 | End: 2022-10-18

## 2022-05-26 RX ORDER — METOPROLOL SUCCINATE 100 MG/1
100 TABLET, EXTENDED RELEASE ORAL DAILY
Qty: 180 TABLET | Refills: 1 | Status: SHIPPED | OUTPATIENT
Start: 2022-05-26 | End: 2023-01-30

## 2022-05-26 RX ORDER — SERTRALINE HYDROCHLORIDE 25 MG/1
25 TABLET, FILM COATED ORAL DAILY
Qty: 90 TABLET | Refills: 1 | Status: SHIPPED | OUTPATIENT
Start: 2022-05-26 | End: 2022-10-18

## 2022-05-26 RX ORDER — CARBIDOPA AND LEVODOPA 25; 100 MG/1; MG/1
1.5 TABLET ORAL 4 TIMES DAILY
COMMUNITY
Start: 2022-05-13

## 2022-05-26 ASSESSMENT — ANXIETY QUESTIONNAIRES
6. BECOMING EASILY ANNOYED OR IRRITABLE: SEVERAL DAYS
3. WORRYING TOO MUCH ABOUT DIFFERENT THINGS: SEVERAL DAYS
7. FEELING AFRAID AS IF SOMETHING AWFUL MIGHT HAPPEN: NOT AT ALL
2. NOT BEING ABLE TO STOP OR CONTROL WORRYING: NOT AT ALL
GAD7 TOTAL SCORE: 2
5. BEING SO RESTLESS THAT IT IS HARD TO SIT STILL: NOT AT ALL
GAD7 TOTAL SCORE: 2
4. TROUBLE RELAXING: NOT AT ALL
1. FEELING NERVOUS, ANXIOUS, OR ON EDGE: NOT AT ALL

## 2022-05-26 ASSESSMENT — PAIN SCALES - GENERAL: PAINLEVEL: NO PAIN (0)

## 2022-05-26 NOTE — NURSING NOTE
Chief Complaint   Patient presents with     Hypertension     Anxiety       Initial /78 (BP Location: Left arm, Patient Position: Chair, Cuff Size: Adult Large)   Pulse 66   Temp 97.5  F (36.4  C) (Tympanic)   Wt 93.9 kg (207 lb)   SpO2 95%   BMI 28.07 kg/m   Estimated body mass index is 28.07 kg/m  as calculated from the following:    Height as of 3/29/22: 1.829 m (6').    Weight as of this encounter: 93.9 kg (207 lb).  Medication Reconciliation: complete  EDOUARD BUSTILLOS LPN

## 2022-06-03 DIAGNOSIS — G47.00 INSOMNIA, UNSPECIFIED TYPE: ICD-10-CM

## 2022-06-06 RX ORDER — TRAZODONE HYDROCHLORIDE 50 MG/1
TABLET, FILM COATED ORAL
Qty: 90 TABLET | Refills: 0 | Status: SHIPPED | OUTPATIENT
Start: 2022-06-06 | End: 2022-09-08

## 2022-09-06 DIAGNOSIS — G47.00 INSOMNIA, UNSPECIFIED TYPE: ICD-10-CM

## 2022-09-08 RX ORDER — TRAZODONE HYDROCHLORIDE 50 MG/1
TABLET, FILM COATED ORAL
Qty: 90 TABLET | Refills: 0 | Status: SHIPPED | OUTPATIENT
Start: 2022-09-08 | End: 2022-12-02

## 2022-09-08 NOTE — TELEPHONE ENCOUNTER
Trazodone       Last Written Prescription Date:  6/6/22  Last Fill Quantity: 90,   # refills: 0  Last Office Visit: 5/26/22  Future Office visit:

## 2022-10-12 NOTE — DISCHARGE INSTRUCTIONS
1) Follow the aftercare instructions provided.  2) You have been given a prescription for a medication that can cause an allergic reactions. Return to the ER if you develop any itching, tongue swelling, wheezing or shortness of breath.  3) You must follow up with your doctor in 12 to 24 hours.   4) you must follow-up with your doctor next week to establish your blood pressure and medication management  5) Your blood pressure today was elevated. You must have it rechecked by your primary care doctor within one week.      [facility]  Ph: 7.314  C02: 53.5  pO2: <15  HCO3 27.2  Lactic 1.69

## 2022-10-18 DIAGNOSIS — I10 BENIGN ESSENTIAL HYPERTENSION: ICD-10-CM

## 2022-10-18 DIAGNOSIS — F41.9 ANXIETY: ICD-10-CM

## 2022-10-18 RX ORDER — SERTRALINE HYDROCHLORIDE 25 MG/1
TABLET, FILM COATED ORAL
Qty: 90 TABLET | Refills: 1 | Status: SHIPPED | OUTPATIENT
Start: 2022-10-18 | End: 2023-04-05

## 2022-10-18 RX ORDER — LOSARTAN POTASSIUM 100 MG/1
TABLET ORAL
Qty: 30 TABLET | Refills: 3 | Status: SHIPPED | OUTPATIENT
Start: 2022-10-18 | End: 2023-03-13

## 2022-11-30 DIAGNOSIS — G47.00 INSOMNIA, UNSPECIFIED TYPE: ICD-10-CM

## 2022-12-02 RX ORDER — TRAZODONE HYDROCHLORIDE 50 MG/1
TABLET, FILM COATED ORAL
Qty: 90 TABLET | Refills: 0 | Status: SHIPPED | OUTPATIENT
Start: 2022-12-02 | End: 2023-02-27

## 2023-01-30 DIAGNOSIS — I10 BENIGN ESSENTIAL HYPERTENSION: ICD-10-CM

## 2023-01-30 RX ORDER — METOPROLOL SUCCINATE 100 MG/1
100 TABLET, EXTENDED RELEASE ORAL 2 TIMES DAILY
Qty: 180 TABLET | Refills: 0 | Status: SHIPPED | OUTPATIENT
Start: 2023-01-30 | End: 2023-05-09

## 2023-01-30 NOTE — TELEPHONE ENCOUNTER
Pt calling and needs refill on Metoprolol.    He states he takes this BID.    Current order is for once daily.    Metoprolol      Last Written Prescription Date:  5.26.2022  Last Fill Quantity: 180,   # refills: 1  Last Office Visit: 5.26.2022  Future Office visit:       Routing refill request to provider for review/approval because:  Order different that pt reported how he is taking.    Please advise.    Changed to BID### Please review if appropriate.      Irene Luz RN

## 2023-02-21 DIAGNOSIS — G47.00 INSOMNIA, UNSPECIFIED TYPE: ICD-10-CM

## 2023-02-24 NOTE — TELEPHONE ENCOUNTER
traZODone (DESYREL) 50 MG tablet      Last Written Prescription Date:  12/02/2022  Last Fill Quantity: 90,   # refills: 0  Last Office Visit: 5/26/2022

## 2023-02-27 RX ORDER — TRAZODONE HYDROCHLORIDE 50 MG/1
TABLET, FILM COATED ORAL
Qty: 90 TABLET | Refills: 0 | Status: SHIPPED | OUTPATIENT
Start: 2023-02-27 | End: 2023-05-23

## 2023-03-01 DIAGNOSIS — G47.00 INSOMNIA, UNSPECIFIED TYPE: ICD-10-CM

## 2023-03-03 RX ORDER — TRAZODONE HYDROCHLORIDE 50 MG/1
TABLET, FILM COATED ORAL
Qty: 90 TABLET | Refills: 0 | OUTPATIENT
Start: 2023-03-03

## 2023-03-10 NOTE — PROGRESS NOTES
Assessment & Plan   Problem List Items Addressed This Visit        Endocrine    Hyperlipidemia LDL goal <100 - Primary    Relevant Orders    Comprehensive metabolic panel (BMP + Alb, Alk Phos, ALT, AST, Total. Bili, TP)    Lipid Profile       Circulatory    Benign essential hypertension    Relevant Medications    losartan (COZAAR) 100 MG tablet    Other Relevant Orders    CBC with platelets and differential    Comprehensive metabolic panel (BMP + Alb, Alk Phos, ALT, AST, Total. Bili, TP)    Lipid Profile   Other Visit Diagnoses     Routine history and physical examination of adult        Relevant Orders    CBC with platelets and differential    Comprehensive metabolic panel (BMP + Alb, Alk Phos, ALT, AST, Total. Bili, TP)    Lipid Profile             25 minutes spent on the date of the encounter doing chart review, review of test results, interpretation of tests, patient visit and documentation        BMI:   Estimated body mass index is 29.02 kg/m  as calculated from the following:    Height as of 3/29/22: 1.829 m (6').    Weight as of this encounter: 97.1 kg (214 lb).       No follow-ups on file.    Jason Leal, DO  [facility]   Bird is a 62 year old, presenting for the following health issues:  Hypertension and Anxiety      HPI     Bird presents today for routine follow up.  He has no specific complaints today.  He is due for routine labs.      Hypertension Follow-up      Do you check your blood pressure regularly outside of the clinic? No     Are you following a low salt diet? Yes    Are your blood pressures ever more than 140 on the top number (systolic) OR more   than 90 on the bottom number (diastolic), for example 140/90? No    Anxiety Follow-Up    How are you doing with your anxiety since your last visit? stable    Are you having other symptoms that might be associated with anxiety? No    Have you had a significant life event? No     Are you feeling  depressed? No    Do you have any concerns with your use of alcohol or other drugs? No    Social History     Tobacco Use     Smoking status: Never     Smokeless tobacco: Never   Substance Use Topics     Alcohol use: Yes     Drug use: No     MAHENDRA-7 SCORE 3/29/2022 5/26/2022 3/13/2023   Total Score 4 2 0     PHQ 3/14/2022 3/29/2022 3/13/2023   PHQ-9 Total Score 6 5 5   Q9: Thoughts of better off dead/self-harm past 2 weeks Not at all Not at all Not at all     Last PHQ-9 3/13/2023   1.  Little interest or pleasure in doing things 1   2.  Feeling down, depressed, or hopeless 0   3.  Trouble falling or staying asleep, or sleeping too much 2   4.  Feeling tired or having little energy 2   5.  Poor appetite or overeating 0   6.  Feeling bad about yourself 0   7.  Trouble concentrating 0   8.  Moving slowly or restless 0   Q9: Thoughts of better off dead/self-harm past 2 weeks 0   PHQ-9 Total Score 5   Difficulty at work, home, or with people -     MAHENDRA-7  3/13/2023   1. Feeling nervous, anxious, or on edge 0   2. Not being able to stop or control worrying 0   3. Worrying too much about different things 0   4. Trouble relaxing 0   5. Being so restless that it is hard to sit still 0   6. Becoming easily annoyed or irritable 0   7. Feeling afraid, as if something awful might happen 0   MAHENDRA-7 Total Score 0   If you checked any problems, how difficult have they made it for you to do your work, take care of things at home, or get along with other people? -         Review of Systems   Constitutional, HEENT, cardiovascular, pulmonary, gi and gu systems are negative, except as otherwise noted.      Objective    /84 (BP Location: Left arm, Patient Position: Chair, Cuff Size: Adult Large)   Pulse 66   Temp 97.4  F (36.3  C) (Tympanic)   Wt 97.1 kg (214 lb)   SpO2 98%   BMI 29.02 kg/m    Body mass index is 29.02 kg/m .  Physical Exam   GENERAL: healthy, alert and no distress  RESP: lungs clear to auscultation - no rales,  rhonchi or wheezes  CV: regular rate and rhythm, normal S1 S2, no S3 or S4, no murmur, click or rub, no peripheral edema and peripheral pulses strong  ABDOMEN: soft, nontender, no hepatosplenomegaly, no masses and bowel sounds normal  MS: no gross musculoskeletal defects noted, no edema  SKIN: no suspicious lesions or rashes  NEURO: Normal strength and tone, mentation intact and speech normal  PSYCH: mentation appears normal, affect normal/bright    Office Visit on 03/08/2022   Component Date Value Ref Range Status     TSH 03/08/2022 1.49  0.40 - 4.00 mU/L Final     Estimated Average Glucose 03/08/2022 126  mg/dL Final     Hemoglobin A1C 03/08/2022 6.0 (H)  0.0 - 5.6 % Final    Normal <5.7%   Prediabetes 5.7-6.4%    Diabetes 6.5% or higher     Note: Adopted from ADA consensus guidelines.     Sodium 03/08/2022 135  133 - 144 mmol/L Final     Potassium 03/08/2022 4.2  3.4 - 5.3 mmol/L Final     Chloride 03/08/2022 106  94 - 109 mmol/L Final     Carbon Dioxide (CO2) 03/08/2022 24  20 - 32 mmol/L Final     Anion Gap 03/08/2022 5  3 - 14 mmol/L Final     Urea Nitrogen 03/08/2022 16  7 - 30 mg/dL Final     Creatinine 03/08/2022 0.88  0.66 - 1.25 mg/dL Final     Calcium 03/08/2022 9.7  8.5 - 10.1 mg/dL Final     Glucose 03/08/2022 102 (H)  70 - 99 mg/dL Final     Alkaline Phosphatase 03/08/2022 58  40 - 150 U/L Final     AST 03/08/2022 16  0 - 45 U/L Final     ALT 03/08/2022 52  0 - 70 U/L Final     Protein Total 03/08/2022 8.0  6.8 - 8.8 g/dL Final     Albumin 03/08/2022 4.3  3.4 - 5.0 g/dL Final     Bilirubin Total 03/08/2022 0.7  0.2 - 1.3 mg/dL Final     GFR Estimate 03/08/2022 >90  >60 mL/min/1.73m2 Final    Effective December 21, 2021 eGFRcr in adults is calculated using the 2021 CKD-EPI creatinine equation which includes age and gender (Nelsy et al., NEJM, DOI: 10.1056/LYXQvw1364946)     No results found for any visits on 03/13/23.  No results found for this or any previous visit (from the past 24 hour(s)).

## 2023-03-13 ENCOUNTER — OFFICE VISIT (OUTPATIENT)
Dept: INTERNAL MEDICINE | Facility: OTHER | Age: 63
End: 2023-03-13
Attending: INTERNAL MEDICINE
Payer: COMMERCIAL

## 2023-03-13 VITALS
DIASTOLIC BLOOD PRESSURE: 84 MMHG | HEART RATE: 66 BPM | TEMPERATURE: 97.4 F | SYSTOLIC BLOOD PRESSURE: 128 MMHG | OXYGEN SATURATION: 98 % | WEIGHT: 214 LBS | BODY MASS INDEX: 29.02 KG/M2

## 2023-03-13 DIAGNOSIS — E83.52 HYPERCALCEMIA: Primary | ICD-10-CM

## 2023-03-13 DIAGNOSIS — Z00.00 ROUTINE HISTORY AND PHYSICAL EXAMINATION OF ADULT: ICD-10-CM

## 2023-03-13 DIAGNOSIS — E78.5 HYPERLIPIDEMIA LDL GOAL <100: Primary | ICD-10-CM

## 2023-03-13 DIAGNOSIS — I10 BENIGN ESSENTIAL HYPERTENSION: ICD-10-CM

## 2023-03-13 LAB
ALBUMIN SERPL BCG-MCNC: 4.4 G/DL (ref 3.5–5.2)
ALP SERPL-CCNC: 58 U/L (ref 40–129)
ALT SERPL W P-5'-P-CCNC: 46 U/L (ref 10–50)
ANION GAP SERPL CALCULATED.3IONS-SCNC: 12 MMOL/L (ref 7–15)
AST SERPL W P-5'-P-CCNC: 21 U/L (ref 10–50)
BASOPHILS # BLD AUTO: 0 10E3/UL (ref 0–0.2)
BASOPHILS NFR BLD AUTO: 1 %
BILIRUB SERPL-MCNC: 0.5 MG/DL
BUN SERPL-MCNC: 15.6 MG/DL (ref 8–23)
CALCIUM SERPL-MCNC: 10.3 MG/DL (ref 8.8–10.2)
CHLORIDE SERPL-SCNC: 99 MMOL/L (ref 98–107)
CHOLEST SERPL-MCNC: 193 MG/DL
CREAT SERPL-MCNC: 1.02 MG/DL (ref 0.67–1.17)
DEPRECATED HCO3 PLAS-SCNC: 25 MMOL/L (ref 22–29)
EOSINOPHIL # BLD AUTO: 0.1 10E3/UL (ref 0–0.7)
EOSINOPHIL NFR BLD AUTO: 2 %
ERYTHROCYTE [DISTWIDTH] IN BLOOD BY AUTOMATED COUNT: 12.5 % (ref 10–15)
GFR SERPL CREATININE-BSD FRML MDRD: 84 ML/MIN/1.73M2
GLUCOSE SERPL-MCNC: 105 MG/DL (ref 70–99)
HCT VFR BLD AUTO: 45.1 % (ref 40–53)
HDLC SERPL-MCNC: 35 MG/DL
HGB BLD-MCNC: 15.3 G/DL (ref 13.3–17.7)
LDLC SERPL CALC-MCNC: 123 MG/DL
LYMPHOCYTES # BLD AUTO: 1.7 10E3/UL (ref 0.8–5.3)
LYMPHOCYTES NFR BLD AUTO: 25 %
MCH RBC QN AUTO: 30 PG (ref 26.5–33)
MCHC RBC AUTO-ENTMCNC: 33.9 G/DL (ref 31.5–36.5)
MCV RBC AUTO: 88 FL (ref 78–100)
MONOCYTES # BLD AUTO: 0.6 10E3/UL (ref 0–1.3)
MONOCYTES NFR BLD AUTO: 9 %
NEUTROPHILS # BLD AUTO: 4.3 10E3/UL (ref 1.6–8.3)
NEUTROPHILS NFR BLD AUTO: 64 %
NONHDLC SERPL-MCNC: 158 MG/DL
PLATELET # BLD AUTO: 233 10E3/UL (ref 150–450)
POTASSIUM SERPL-SCNC: 4.8 MMOL/L (ref 3.4–5.3)
PROT SERPL-MCNC: 7.3 G/DL (ref 6.4–8.3)
RBC # BLD AUTO: 5.1 10E6/UL (ref 4.4–5.9)
SODIUM SERPL-SCNC: 136 MMOL/L (ref 136–145)
TRIGL SERPL-MCNC: 175 MG/DL
WBC # BLD AUTO: 6.7 10E3/UL (ref 4–11)

## 2023-03-13 PROCEDURE — 99214 OFFICE O/P EST MOD 30 MIN: CPT | Performed by: INTERNAL MEDICINE

## 2023-03-13 PROCEDURE — 36415 COLL VENOUS BLD VENIPUNCTURE: CPT | Mod: ZL | Performed by: INTERNAL MEDICINE

## 2023-03-13 PROCEDURE — 80053 COMPREHEN METABOLIC PANEL: CPT | Mod: ZL | Performed by: INTERNAL MEDICINE

## 2023-03-13 PROCEDURE — 85025 COMPLETE CBC W/AUTO DIFF WBC: CPT | Mod: ZL | Performed by: INTERNAL MEDICINE

## 2023-03-13 PROCEDURE — G0463 HOSPITAL OUTPT CLINIC VISIT: HCPCS

## 2023-03-13 PROCEDURE — 80061 LIPID PANEL: CPT | Mod: ZL | Performed by: INTERNAL MEDICINE

## 2023-03-13 RX ORDER — LOSARTAN POTASSIUM 100 MG/1
100 TABLET ORAL DAILY
Qty: 90 TABLET | Refills: 3 | Status: SHIPPED | OUTPATIENT
Start: 2023-03-13 | End: 2024-01-05

## 2023-03-13 ASSESSMENT — ANXIETY QUESTIONNAIRES
2. NOT BEING ABLE TO STOP OR CONTROL WORRYING: NOT AT ALL
1. FEELING NERVOUS, ANXIOUS, OR ON EDGE: NOT AT ALL
5. BEING SO RESTLESS THAT IT IS HARD TO SIT STILL: NOT AT ALL
6. BECOMING EASILY ANNOYED OR IRRITABLE: NOT AT ALL
GAD7 TOTAL SCORE: 0
7. FEELING AFRAID AS IF SOMETHING AWFUL MIGHT HAPPEN: NOT AT ALL
GAD7 TOTAL SCORE: 0
3. WORRYING TOO MUCH ABOUT DIFFERENT THINGS: NOT AT ALL

## 2023-03-13 ASSESSMENT — PATIENT HEALTH QUESTIONNAIRE - PHQ9
SUM OF ALL RESPONSES TO PHQ QUESTIONS 1-9: 5
5. POOR APPETITE OR OVEREATING: NOT AT ALL

## 2023-03-13 ASSESSMENT — PAIN SCALES - GENERAL: PAINLEVEL: NO PAIN (0)

## 2023-03-20 ENCOUNTER — LAB (OUTPATIENT)
Dept: LAB | Facility: OTHER | Age: 63
End: 2023-03-20
Payer: COMMERCIAL

## 2023-03-20 DIAGNOSIS — E83.52 HYPERCALCEMIA: ICD-10-CM

## 2023-03-20 LAB — CALCIUM SERPL-MCNC: 10.2 MG/DL (ref 8.8–10.2)

## 2023-03-20 PROCEDURE — 82310 ASSAY OF CALCIUM: CPT | Mod: ZL

## 2023-03-20 PROCEDURE — 36415 COLL VENOUS BLD VENIPUNCTURE: CPT | Mod: ZL

## 2023-04-05 DIAGNOSIS — F41.9 ANXIETY: ICD-10-CM

## 2023-04-06 NOTE — TELEPHONE ENCOUNTER
Zoloft       Last Written Prescription Date:  10/18/22  Last Fill Quantity: 90,   # refills: 1  Last Office Visit: 3/13/23  Future Office visit:

## 2023-04-07 RX ORDER — SERTRALINE HYDROCHLORIDE 25 MG/1
25 TABLET, FILM COATED ORAL DAILY
Qty: 90 TABLET | Refills: 0 | Status: SHIPPED | OUTPATIENT
Start: 2023-04-07 | End: 2023-07-18

## 2023-05-09 DIAGNOSIS — I10 BENIGN ESSENTIAL HYPERTENSION: ICD-10-CM

## 2023-05-09 RX ORDER — METOPROLOL SUCCINATE 100 MG/1
100 TABLET, EXTENDED RELEASE ORAL 2 TIMES DAILY
Qty: 180 TABLET | Refills: 2 | Status: SHIPPED | OUTPATIENT
Start: 2023-05-09 | End: 2023-10-30

## 2023-05-19 DIAGNOSIS — G47.00 INSOMNIA, UNSPECIFIED TYPE: ICD-10-CM

## 2023-05-23 RX ORDER — TRAZODONE HYDROCHLORIDE 50 MG/1
TABLET, FILM COATED ORAL
Qty: 90 TABLET | Refills: 0 | Status: SHIPPED | OUTPATIENT
Start: 2023-05-23 | End: 2023-08-18

## 2023-05-23 NOTE — TELEPHONE ENCOUNTER
Trazodone      Last Written Prescription Date:  2.27.23  Last Fill Quantity: #90,   # refills: 0  Last Office Visit: 3.13.23  Future Office visit:       Routing refill request to provider for review/approval because:

## 2023-07-15 DIAGNOSIS — F41.9 ANXIETY: ICD-10-CM

## 2023-07-18 RX ORDER — SERTRALINE HYDROCHLORIDE 25 MG/1
TABLET, FILM COATED ORAL
Qty: 90 TABLET | Refills: 0 | Status: SHIPPED | OUTPATIENT
Start: 2023-07-18 | End: 2023-10-10

## 2023-07-18 NOTE — TELEPHONE ENCOUNTER
Zoloft      Last Written Prescription Date:  4.7.23  Last Fill Quantity: #90,   # refills: 0  Last Office Visit: 3.13.23  Future Office visit:       Routing refill request to provider for review/approval because:

## 2023-08-16 DIAGNOSIS — G47.00 INSOMNIA, UNSPECIFIED TYPE: ICD-10-CM

## 2023-08-17 NOTE — TELEPHONE ENCOUNTER
Trazodone      Last Written Prescription Date:  5.23.23  Last Fill Quantity: #90,   # refills: 0  Last Office Visit: 3.13.23  Future Office visit:       Routing refill request to provider for review/approval because:

## 2023-08-18 RX ORDER — TRAZODONE HYDROCHLORIDE 50 MG/1
TABLET, FILM COATED ORAL
Qty: 90 TABLET | Refills: 0 | Status: SHIPPED | OUTPATIENT
Start: 2023-08-18 | End: 2023-09-18

## 2023-08-19 DIAGNOSIS — G47.00 INSOMNIA, UNSPECIFIED TYPE: ICD-10-CM

## 2023-08-22 RX ORDER — TRAZODONE HYDROCHLORIDE 50 MG/1
TABLET, FILM COATED ORAL
Qty: 90 TABLET | Refills: 0 | OUTPATIENT
Start: 2023-08-22

## 2023-09-18 ENCOUNTER — OFFICE VISIT (OUTPATIENT)
Dept: INTERNAL MEDICINE | Facility: OTHER | Age: 63
End: 2023-09-18
Attending: INTERNAL MEDICINE
Payer: COMMERCIAL

## 2023-09-18 VITALS
OXYGEN SATURATION: 94 % | WEIGHT: 210 LBS | BODY MASS INDEX: 28.48 KG/M2 | RESPIRATION RATE: 20 BRPM | DIASTOLIC BLOOD PRESSURE: 86 MMHG | SYSTOLIC BLOOD PRESSURE: 138 MMHG | TEMPERATURE: 97.5 F | HEART RATE: 68 BPM

## 2023-09-18 DIAGNOSIS — G47.00 INSOMNIA, UNSPECIFIED TYPE: ICD-10-CM

## 2023-09-18 DIAGNOSIS — K21.00 GASTROESOPHAGEAL REFLUX DISEASE WITH ESOPHAGITIS WITHOUT HEMORRHAGE: Primary | ICD-10-CM

## 2023-09-18 PROCEDURE — 99213 OFFICE O/P EST LOW 20 MIN: CPT | Performed by: INTERNAL MEDICINE

## 2023-09-18 PROCEDURE — G0463 HOSPITAL OUTPT CLINIC VISIT: HCPCS

## 2023-09-18 RX ORDER — TRAZODONE HYDROCHLORIDE 50 MG/1
25-50 TABLET, FILM COATED ORAL AT BEDTIME
Qty: 90 TABLET | Refills: 1 | Status: SHIPPED | OUTPATIENT
Start: 2023-11-17 | End: 2024-05-08

## 2023-09-18 RX ORDER — OMEPRAZOLE 40 MG/1
40 CAPSULE, DELAYED RELEASE ORAL DAILY
Qty: 90 CAPSULE | Refills: 1 | Status: SHIPPED | OUTPATIENT
Start: 2023-09-18

## 2023-09-18 ASSESSMENT — ANXIETY QUESTIONNAIRES
GAD7 TOTAL SCORE: 0
GAD7 TOTAL SCORE: 0
3. WORRYING TOO MUCH ABOUT DIFFERENT THINGS: NOT AT ALL
7. FEELING AFRAID AS IF SOMETHING AWFUL MIGHT HAPPEN: NOT AT ALL
5. BEING SO RESTLESS THAT IT IS HARD TO SIT STILL: NOT AT ALL
4. TROUBLE RELAXING: NOT AT ALL
6. BECOMING EASILY ANNOYED OR IRRITABLE: NOT AT ALL
1. FEELING NERVOUS, ANXIOUS, OR ON EDGE: NOT AT ALL
IF YOU CHECKED OFF ANY PROBLEMS ON THIS QUESTIONNAIRE, HOW DIFFICULT HAVE THESE PROBLEMS MADE IT FOR YOU TO DO YOUR WORK, TAKE CARE OF THINGS AT HOME, OR GET ALONG WITH OTHER PEOPLE: NOT DIFFICULT AT ALL
2. NOT BEING ABLE TO STOP OR CONTROL WORRYING: NOT AT ALL

## 2023-09-18 ASSESSMENT — PAIN SCALES - GENERAL: PAINLEVEL: NO PAIN (0)

## 2023-09-18 ASSESSMENT — PATIENT HEALTH QUESTIONNAIRE - PHQ9
SUM OF ALL RESPONSES TO PHQ QUESTIONS 1-9: 1
SUM OF ALL RESPONSES TO PHQ QUESTIONS 1-9: 1
10. IF YOU CHECKED OFF ANY PROBLEMS, HOW DIFFICULT HAVE THESE PROBLEMS MADE IT FOR YOU TO DO YOUR WORK, TAKE CARE OF THINGS AT HOME, OR GET ALONG WITH OTHER PEOPLE: NOT DIFFICULT AT ALL

## 2023-09-18 NOTE — PROGRESS NOTES
Assessment & Plan   Problem List Items Addressed This Visit    None  Visit Diagnoses       Gastroesophageal reflux disease with esophagitis without hemorrhage    -  Primary    Relevant Medications    omeprazole (PRILOSEC) 40 MG DR capsule    Insomnia, unspecified type        Relevant Medications    traZODone (DESYREL) 50 MG tablet (Start on 11/17/2023)               20 minutes spent by me on the date of the encounter doing chart review, review of test results, interpretation of tests, patient visit, and documentation            No follow-ups on file.    Jason Leal,   [facility]   Bird is a 63 year old, presenting for the following health issues:  Hypertension and Anxiety      HPI       Bird presents to clinic today for follow-up.  He does report some symptoms of GERD and dyspepsia for which he has been taking his mom's sucralfate which has been somewhat helpful for him.  He does report drinking quite a bit of coffee.  He has never had an EGD.  He states that the abdominal discomfort generally improves after he takes his sucralfate.  We did discuss the option as to an EGD and changing him to a PPI.  I informed him that the sucralfate is not going to hurt him however a PPI may be a bit more beneficial.  He is agreeable to this today.  In addition he does need a refill of his trazodone for insomnia.  He states that for some reason he has been having trouble with [facility] and refilling this hence a new prescription was sent in today.    Hypertension Follow-up    Do you check your blood pressure regularly outside of the clinic? No   Are you following a low salt diet? Yes  Are your blood pressures ever more than 140 on the top number (systolic) OR more   than 90 on the bottom number (diastolic), for example 140/90? No    Anxiety Follow-Up  How are you doing with your anxiety since your last visit? No change  Are you having other symptoms that might be associated with  anxiety? No  Have you had a significant life event? No   Are you feeling depressed? No  Do you have any concerns with your use of alcohol or other drugs? No    Social History     Tobacco Use    Smoking status: Never    Smokeless tobacco: Never   Substance Use Topics    Alcohol use: Yes    Drug use: No         5/26/2022    10:00 AM 3/13/2023    10:17 AM 9/18/2023     8:22 AM   MAHENDRA-7 SCORE   Total Score   0 (minimal anxiety)   Total Score 2 0 0         3/29/2022     9:25 AM 3/13/2023    10:17 AM 9/18/2023     8:19 AM   PHQ   PHQ-9 Total Score 5 5 1   Q9: Thoughts of better off dead/self-harm past 2 weeks Not at all Not at all Not at all         9/18/2023     8:19 AM   Last PHQ-9   1.  Little interest or pleasure in doing things 0   2.  Feeling down, depressed, or hopeless 0   3.  Trouble falling or staying asleep, or sleeping too much 0   4.  Feeling tired or having little energy 1   5.  Poor appetite or overeating 0   6.  Feeling bad about yourself 0   7.  Trouble concentrating 0   8.  Moving slowly or restless 0   Q9: Thoughts of better off dead/self-harm past 2 weeks 0   PHQ-9 Total Score 1         9/18/2023     8:22 AM   MAHENDRA-7    1. Feeling nervous, anxious, or on edge 0   2. Not being able to stop or control worrying 0   3. Worrying too much about different things 0   4. Trouble relaxing 0   5. Being so restless that it is hard to sit still 0   6. Becoming easily annoyed or irritable 0   7. Feeling afraid, as if something awful might happen 0   MAHENDRA-7 Total Score 0   If you checked any problems, how difficult have they made it for you to do your work, take care of things at home, or get along with other people? Not difficult at all         Review of Systems   Constitutional, HEENT, cardiovascular, pulmonary, gi and gu systems are negative, except as otherwise noted.      Objective    /86 (BP Location: Right arm, Patient Position: Sitting, Cuff Size: Adult Regular)   Pulse 68   Temp 97.5  F (36.4  C)  (Tympanic)   Resp 20   Wt 95.3 kg (210 lb)   SpO2 94%   BMI 28.48 kg/m    Body mass index is 28.48 kg/m .  Physical Exam   GENERAL: healthy, alert and no distress  EYES: Eyes grossly normal to inspection, PERRL and conjunctivae and sclerae normal  RESP: lungs clear to auscultation - no rales, rhonchi or wheezes  CV: regular rate and rhythm, normal S1 S2, no S3 or S4, no murmur, click or rub, no peripheral edema and peripheral pulses strong  MS: no gross musculoskeletal defects noted, no edema  PSYCH: mentation appears normal, affect normal/bright    Lab on 03/20/2023   Component Date Value Ref Range Status    Calcium 03/20/2023 10.2  8.8 - 10.2 mg/dL Final     No results found for any visits on 09/18/23.  No results found for this or any previous visit (from the past 24 hour(s)).            Answers submitted by the patient for this visit:  Patient Health Questionnaire (Submitted on 9/18/2023)  If you checked off any problems, how difficult have these problems made it for you to do your work, take care of things at home, or get along with other people?: Not difficult at all  PHQ9 TOTAL SCORE: 1  MAHENDRA-7 (Submitted on 9/18/2023)  MAHENDRA 7 TOTAL SCORE: 0

## 2023-10-08 DIAGNOSIS — F41.9 ANXIETY: ICD-10-CM

## 2023-10-09 NOTE — TELEPHONE ENCOUNTER
Zoloft       Last Written Prescription Date:  7/18/2023  Last Fill Quantity: 90,   # refills: 0  Last Office Visit: 09/18/2023

## 2023-10-10 DIAGNOSIS — F41.9 ANXIETY: ICD-10-CM

## 2023-10-10 RX ORDER — SERTRALINE HYDROCHLORIDE 25 MG/1
TABLET, FILM COATED ORAL
Qty: 90 TABLET | Refills: 0 | Status: SHIPPED | OUTPATIENT
Start: 2023-10-10 | End: 2023-10-10

## 2023-10-10 RX ORDER — SERTRALINE HYDROCHLORIDE 25 MG/1
25 TABLET, FILM COATED ORAL DAILY
Qty: 90 TABLET | Refills: 3 | Status: SHIPPED | OUTPATIENT
Start: 2023-10-10 | End: 2024-07-29

## 2023-10-10 NOTE — TELEPHONE ENCOUNTER
Reason for call:  Medication      Have you contacted your pharmacy? Yes   Pharmacy said Dr Leal called the medicationIf patient has contacted Pharmacy and it has been over 72hrs, continue to #2  Medication sertraline 25mg 90 day supply  What Pharmacy do you use? [facility] [place]      (Please note that the turn-around-time for prescriptions is 72 business hours; I am sending your request at this time. SEND TO  [facility]  )

## 2023-10-28 DIAGNOSIS — I10 BENIGN ESSENTIAL HYPERTENSION: ICD-10-CM

## 2023-10-30 RX ORDER — METOPROLOL SUCCINATE 100 MG/1
100 TABLET, EXTENDED RELEASE ORAL 2 TIMES DAILY
Qty: 180 TABLET | Refills: 0 | Status: SHIPPED | OUTPATIENT
Start: 2023-10-30 | End: 2024-01-22

## 2023-10-30 NOTE — TELEPHONE ENCOUNTER
Toprol      Last Written Prescription Date:  5/9/23  Last Fill Quantity: 180,   # refills: 2  Last Office Visit: 9/18/23  Future Office visit:       Routing refill request to provider for review/approval because:

## 2023-12-11 NOTE — NURSING NOTE
Chief Complaint   Patient presents with     Hypertension       Initial /82 (BP Location: Right arm, Patient Position: Chair, Cuff Size: Adult Large)   Pulse 78   Temp 96.9  F (36.1  C) (Tympanic)   Wt 89.4 kg (197 lb)   SpO2 97%   BMI 26.72 kg/m   Estimated body mass index is 26.72 kg/m  as calculated from the following:    Height as of 12/7/20: 1.829 m (6').    Weight as of this encounter: 89.4 kg (197 lb).  Medication Reconciliation: complete  EDOUARD BUSTILLOS LPN  
11-Dec-2023 08:07

## 2024-01-04 DIAGNOSIS — I10 BENIGN ESSENTIAL HYPERTENSION: ICD-10-CM

## 2024-01-04 NOTE — TELEPHONE ENCOUNTER
Losartan (Cozaar) 100 MG tablet    Last Written Prescription Date:  03/13/2023  Last Fill Quantity: 90,   # refills: 3  Last Office Visit: 09/18/2023

## 2024-01-05 RX ORDER — LOSARTAN POTASSIUM 100 MG/1
100 TABLET ORAL DAILY
Qty: 90 TABLET | Refills: 0 | Status: SHIPPED | OUTPATIENT
Start: 2024-01-05 | End: 2024-04-03

## 2024-01-20 DIAGNOSIS — I10 BENIGN ESSENTIAL HYPERTENSION: ICD-10-CM

## 2024-01-22 RX ORDER — METOPROLOL SUCCINATE 100 MG/1
100 TABLET, EXTENDED RELEASE ORAL 2 TIMES DAILY
Qty: 180 TABLET | Refills: 2 | Status: SHIPPED | OUTPATIENT
Start: 2024-01-22 | End: 2024-09-16

## 2024-01-22 NOTE — TELEPHONE ENCOUNTER
METOPROLOL ER SUCCINATE 100MG TABS           Last Written Prescription Date:  10/30/23  Last Fill Quantity: 180,   # refills: 0  Last Office Visit: 9/18/23  Future Office visit:       Routing refill request to provider for review/approval because:    Beta-Blockers Protocol [id number]/20/2024 08:00 AM   Protocol Details Recent (12 mo) or future (30 days) visit within the authorizing provider's specialty     LOV 9/18/23- patient has been in for office visit within the last 12 months.

## 2024-02-19 ENCOUNTER — TELEPHONE (OUTPATIENT)
Dept: INTERNAL MEDICINE | Facility: OTHER | Age: 64
End: 2024-02-19

## 2024-02-19 NOTE — TELEPHONE ENCOUNTER
Call returned to patient. Patient requesting appointment for cough - productive - clear in color     Denies fever. Covid test at home - patient has not taken at home covid test     Symptoms starting x 1 week ago.     Appointment scheduled with Dr. Leal.     Next 5 appointments (look out 90 days)      Feb 20, 2024  2:00 PM  (Arrive by 1:45 PM)  SHORT with Jason Leal,   [facility] ([facility] ) [address] [place] [address]  [facility] [id number] [phone]

## 2024-02-19 NOTE — TELEPHONE ENCOUNTER
2:38 PM    Reason for Call: OVERBOOK    Patient is having the following symptoms: POSSIBLE PNEUMONIA OR BRONCHITIS - SOUNDS LIKE HE HAS CROUP for 5 days.    The patient is requesting an appointment for ASAP with DR STUART.    Was an appointment offered for this call? No  If yes : Appointment type              Date    Preferred method for responding to this message: Telephone Call  What is your phone number ? [phone]     If we cannot reach you directly, may we leave a detailed response at the number you provided? Yes    Can this message wait until your PCP/provider returns, if unavailable today? No,     Urmila Leal

## 2024-02-26 ENCOUNTER — OFFICE VISIT (OUTPATIENT)
Dept: INTERNAL MEDICINE | Facility: OTHER | Age: 64
End: 2024-02-26
Attending: INTERNAL MEDICINE
Payer: COMMERCIAL

## 2024-02-26 VITALS
DIASTOLIC BLOOD PRESSURE: 86 MMHG | SYSTOLIC BLOOD PRESSURE: 136 MMHG | OXYGEN SATURATION: 95 % | BODY MASS INDEX: 29.16 KG/M2 | HEART RATE: 80 BPM | RESPIRATION RATE: 20 BRPM | TEMPERATURE: 97.3 F | WEIGHT: 215 LBS

## 2024-02-26 DIAGNOSIS — R05.2 SUBACUTE COUGH: Primary | ICD-10-CM

## 2024-02-26 DIAGNOSIS — J20.8 ACUTE BRONCHITIS DUE TO OTHER SPECIFIED ORGANISMS: ICD-10-CM

## 2024-02-26 LAB
FLUAV RNA SPEC QL NAA+PROBE: NEGATIVE
FLUBV RNA RESP QL NAA+PROBE: NEGATIVE
RSV RNA SPEC NAA+PROBE: NEGATIVE
SARS-COV-2 RNA RESP QL NAA+PROBE: NEGATIVE

## 2024-02-26 PROCEDURE — G0463 HOSPITAL OUTPT CLINIC VISIT: HCPCS

## 2024-02-26 PROCEDURE — 87637 SARSCOV2&INF A&B&RSV AMP PRB: CPT | Mod: ZL | Performed by: INTERNAL MEDICINE

## 2024-02-26 PROCEDURE — 99213 OFFICE O/P EST LOW 20 MIN: CPT | Performed by: INTERNAL MEDICINE

## 2024-02-26 RX ORDER — AZITHROMYCIN 250 MG/1
TABLET, FILM COATED ORAL
Qty: 11 TABLET | Refills: 0 | Status: SHIPPED | OUTPATIENT
Start: 2024-02-26 | End: 2024-03-07

## 2024-02-26 ASSESSMENT — PAIN SCALES - GENERAL: PAINLEVEL: NO PAIN (0)

## 2024-02-26 NOTE — PROGRESS NOTES
Assessment & Plan   Problem List Items Addressed This Visit    None  Visit Diagnoses       Subacute cough    -  Primary    Relevant Medications    azithromycin (ZITHROMAX) 250 MG tablet    Other Relevant Orders    Symptomatic Influenza A/B, RSV, & SARS-CoV2 PCR (COVID-19)    Acute bronchitis due to other specified organisms                   20 minutes spent by me on the date of the encounter doing chart review, review of test results, interpretation of tests, patient visit, and documentation          No follow-ups on file.      Subjective   Bird is a 63 year old, presenting for the following health issues:  Cough    HPI     Bird presents today with nearly 2 weeks of cough and chest congestion.  He does report chills associated with this along with rhinorrhea.  In addition he reports generalized malaise.  Denies any fevers.  Denies any ill contacts.  States that is difficult to sleep due to the coughing.  He has not yet tested for COVID however he would like to do so due to the fact that his mother is 99 years old and she sees him daily.      Acute Illness- patient has not taken a covid test at home.   Acute illness concerns: cough, chest congestion, shortness of breath, wheezing intermittently  Onset/Duration: x 1 1/2 weeks ago   Symptoms:  Fever: No  Chills/Sweats: YES- chills   Headache (location?): YES  Sinus Pressure: YES  Conjunctivitis:  No  Ear Pain: no  Rhinorrhea: YES  Congestion: YES  Sore Throat: No  Cough: YES - productive - brownish in color   Wheeze: YES  Decreased Appetite: No  Nausea: No  Vomiting: No  Diarrhea: No  Dysuria/Freq.: No  Dysuria or Hematuria: No  Fatigue/Achiness: YES- fatigue and body aches   Sick/Strep Exposure: No  Therapies tried and outcome: sinus medication OTC, Tylenol, cough drops and orange juice           Review of Systems  Constitutional, HEENT, cardiovascular, pulmonary, gi and gu systems are negative, except as otherwise noted.      Objective    /86 (BP Location:  Right arm, Patient Position: Sitting, Cuff Size: Adult Large)   Pulse 80   Temp 97.3  F (36.3  C) (Tympanic)   Resp 20   Wt 97.5 kg (215 lb)   SpO2 95%   BMI 29.16 kg/m    Body mass index is 29.16 kg/m .  Physical Exam   GENERAL: alert and no distress  EYES: Eyes grossly normal to inspection, PERRL and conjunctivae and sclerae normal  HENT: ear canals and TM's normal, nose and mouth without ulcers or lesions  NECK: no adenopathy, no asymmetry, masses, or scars  RESP: Generally clear to auscultation however slightly coarse in the bases bilaterally.  Patient with a barky type of cough.  CV: regular rate and rhythm, normal S1 S2, no S3 or S4, no murmur, click or rub, no peripheral edema  SKIN: no suspicious lesions or rashes  PSYCH: mentation appears normal, affect normal/bright    Lab on 03/20/2023   Component Date Value Ref Range Status    Calcium 03/20/2023 10.2  8.8 - 10.2 mg/dL Final     No results found for any visits on 02/26/24.  No results found for this or any previous visit (from the past 24 hour(s)).        Signed Electronically by: Jason Leal DO

## 2024-04-03 DIAGNOSIS — I10 BENIGN ESSENTIAL HYPERTENSION: ICD-10-CM

## 2024-04-03 RX ORDER — LOSARTAN POTASSIUM 100 MG/1
100 TABLET ORAL DAILY
Qty: 90 TABLET | Refills: 1 | Status: SHIPPED | OUTPATIENT
Start: 2024-04-03 | End: 2024-09-16

## 2024-04-03 NOTE — TELEPHONE ENCOUNTER
Losartan      Last Written Prescription Date:  1/5/24  Last Fill Quantity: 90,   # refills: 0  Last Office Visit: 2/26/24  Future Office visit:       Routing refill request to provider for review/approval because:

## 2024-04-11 ENCOUNTER — TRANSFERRED RECORDS (OUTPATIENT)
Dept: HEALTH INFORMATION MANAGEMENT | Facility: CLINIC | Age: 64
End: 2024-04-11

## 2024-05-07 DIAGNOSIS — G47.00 INSOMNIA, UNSPECIFIED TYPE: ICD-10-CM

## 2024-05-07 NOTE — TELEPHONE ENCOUNTER
Trazodone      Last Written Prescription Date:  11/17/23  Last Fill Quantity: 90,   # refills: 1  Last Office Visit: 2/26/24  Future Office visit:       Routing refill request to provider for review/approval because:

## 2024-05-08 RX ORDER — TRAZODONE HYDROCHLORIDE 50 MG/1
25-50 TABLET, FILM COATED ORAL AT BEDTIME
Qty: 90 TABLET | Refills: 1 | Status: SHIPPED | OUTPATIENT
Start: 2024-05-08 | End: 2024-08-06

## 2024-06-07 DIAGNOSIS — F41.9 ANXIETY: ICD-10-CM

## 2024-06-07 RX ORDER — SERTRALINE HYDROCHLORIDE 25 MG/1
25 TABLET, FILM COATED ORAL DAILY
Qty: 90 TABLET | Refills: 3 | OUTPATIENT
Start: 2024-06-07

## 2024-06-07 NOTE — TELEPHONE ENCOUNTER
Zoloft       Last Written Prescription Date:  10/10/2023  Last Fill Quantity: 90,   # refills: 3  Last Office Visit: 2/26/2024  Future Office visit:    Next 5 appointments (look out 90 days)      Sep 05, 2024 10:00 AM  (Arrive by 9:45 AM)  Pre-Operative Physical with Jason Leal DO  [facility] ([facility] ) [address]  [place] [id number] [phone]

## 2024-07-17 ENCOUNTER — OFFICE VISIT (OUTPATIENT)
Dept: INTERNAL MEDICINE | Facility: OTHER | Age: 64
End: 2024-07-17
Attending: INTERNAL MEDICINE
Payer: COMMERCIAL

## 2024-07-17 VITALS
HEART RATE: 72 BPM | DIASTOLIC BLOOD PRESSURE: 84 MMHG | RESPIRATION RATE: 20 BRPM | TEMPERATURE: 97.8 F | WEIGHT: 206.8 LBS | OXYGEN SATURATION: 98 % | BODY MASS INDEX: 28.05 KG/M2 | SYSTOLIC BLOOD PRESSURE: 138 MMHG

## 2024-07-17 DIAGNOSIS — Z12.5 SCREENING FOR PROSTATE CANCER: ICD-10-CM

## 2024-07-17 DIAGNOSIS — E78.5 HYPERLIPIDEMIA LDL GOAL <100: ICD-10-CM

## 2024-07-17 DIAGNOSIS — Z12.11 SCREENING FOR COLON CANCER: Primary | ICD-10-CM

## 2024-07-17 PROCEDURE — G0463 HOSPITAL OUTPT CLINIC VISIT: HCPCS

## 2024-07-17 PROCEDURE — 99213 OFFICE O/P EST LOW 20 MIN: CPT | Performed by: INTERNAL MEDICINE

## 2024-07-17 ASSESSMENT — PAIN SCALES - GENERAL: PAINLEVEL: MODERATE PAIN (4)

## 2024-07-17 NOTE — PROGRESS NOTES
Assessment & Plan   Problem List Items Addressed This Visit          Endocrine    Hyperlipidemia LDL goal <100     Other Visit Diagnoses       Screening for colon cancer    -  Primary    Screening for prostate cancer                          No follow-ups on file.      Subjective   Bird is a 63 year old, presenting for the following health issues:  Knee Pain    History of Present Illness       Reason for visit:  Follow up on medication    He eats 0-1 servings of fruits and vegetables daily.He consumes 1 sweetened beverage(s) daily.He exercises with enough effort to increase his heart rate 9 or less minutes per day.  He exercises with enough effort to increase his heart rate 3 or less days per week.   He is taking medications regularly.     TKA - RT scheduled on 9/27/24    Bird presents today as he continues to have right knee pain.  He has a history of Parkinson's, hypertension and prediabetes.  He is scheduled for right total knee replacement September 27, 2024.  He has been using Tylenol for pain however he wanted to discuss other options.  He states that his other medications make him sleepy hence he does not want any narcotics.  We did discuss tramadol however he was not interested in that.  He has been using Tylenol however states that that has not been helpful.  We did discuss the use of NSAIDs and/or ibuprofen which she is agreeable to.  We did discuss the fact that there are some potential side effects of ibuprofen use including gastritis and acute kidney injury however as he states that this is short-term treatment plan as he looks forward to getting his knee replaced in about 2 months.              Review of Systems  Constitutional, HEENT, cardiovascular, pulmonary, gi and gu systems are negative, except as otherwise noted.      Objective    /84 (BP Location: Right arm, Patient Position: Sitting, Cuff Size: Adult Large)   Pulse 72   Temp 97.8  F (36.6  C) (Tympanic)   Resp 20   Wt 93.8 kg (206  lb 12.8 oz)   SpO2 98%   BMI 28.05 kg/m    Body mass index is 28.05 kg/m .  Physical Exam   GENERAL: alert and no distress  PSYCH: mentation appears normal, affect normal/bright    Office Visit on 02/26/2024   Component Date Value Ref Range Status    Influenza A PCR 02/26/2024 Negative  Negative Final    Influenza B PCR 02/26/2024 Negative  Negative Final    RSV PCR 02/26/2024 Negative  Negative Final    SARS CoV2 PCR 02/26/2024 Negative  Negative Final    NEGATIVE: SARS-CoV-2 (COVID-19) RNA not detected, presumed negative.     No results found for any visits on 07/17/24.  No results found for this or any previous visit (from the past 24 hour(s)).        Signed Electronically by: Jason Leal DO

## 2024-07-29 DIAGNOSIS — F41.9 ANXIETY: ICD-10-CM

## 2024-07-29 RX ORDER — SERTRALINE HYDROCHLORIDE 25 MG/1
25 TABLET, FILM COATED ORAL DAILY
Qty: 90 TABLET | Refills: 3 | Status: SHIPPED | OUTPATIENT
Start: 2024-07-29

## 2024-07-29 NOTE — TELEPHONE ENCOUNTER
[facility]    Gastroenterology Consultation    Identification: Michaela Dhaliwal, 58 year old, , female   Consulting Provider: Nilda Jimenez, *  Reason for Consultation: RUQ abdominal pain    Subjective     Source of History: Patient and EMR. Reliable.    History of Present Illness: The patient is a 58-year-old woman with a history of chronic pancreatitis and a related stricture status post cholecystectomy and CBD stent placement in the [facility] followed by an exchange of the stent here at [facility] in April 2020.  At that time, she had presented with right upper quadrant pain and experienced significant relief after the stent exchange.  Her plan was to follow-up with her physicians/surgeons in the [facility] but she was unable to travel because of the COVID-19 pandemic.  She was doing well until 1 week ago when her abdominal pain recurred.  Localized to the right upper quadrant and does not radiate.  Not severe.  Not associated with nausea, vomiting or diarrhea.  No fever or chills.    ER course: She presented to the ER because she has no PCP.  Her labs were unremarkable except for an elevated lipase level.  CT abdomen and pelvis with contrast redemonstrated the stent in an acceptable position with stable intrahepatic and extrahepatic biliary ductal dilatation.  There was no evidence of cholangitis.  She was not started on antibiotics.  She was admitted for pain control and the plan for ERCP for stent exchange as previously planned.    Review of Systems   Constitutional: Negative for chills, fever and unexpected weight change.   HENT: Negative for dental problem, drooling, mouth sores, sore throat, trouble swallowing and voice change.    Eyes: Negative for visual disturbance.   Respiratory: Negative for cough, shortness of breath and stridor.    Cardiovascular: Negative for chest pain and leg swelling.   Gastrointestinal: Positive for abdominal pain. Negative for  SERTRALINE 25MG TABLETS         Last Written Prescription Date:  10/10/23  Last Fill Quantity: 90,   # refills: 3  Last Office Visit: 7/17/24  Future Office visit:    Next 5 appointments (look out 90 days)      Sep 05, 2024 10:00 AM  (Arrive by 9:45 AM)  Pre-Operative Physical with Jason Leal DO  [facility] ([facility] ) [address]  [facility] [address]  [phone]             Routing refill request to provider for review/approval because:  SSRIs Protocol Lospta3007/29/2024 08:02 AM   Protocol Details MAHENDRA-7 score of less than 5 in past 6 months.         5/26/2022    10:00 AM 3/13/2023    10:17 AM 9/18/2023     8:22 AM   MAHENDRA-7 SCORE   Total Score   0 (minimal anxiety)   Total Score 2 0 0           abdominal distention, anal bleeding, blood in stool, constipation, diarrhea, nausea, rectal pain and vomiting.   Endocrine: Negative for polydipsia and polyphagia.   Genitourinary: Negative for difficulty urinating, dysuria, flank pain, genital sores, pelvic pain and urgency.   Musculoskeletal: Negative for back pain and gait problem.   Skin: Negative for rash.   Allergic/Immunologic: Negative for immunocompromised state.   Neurological: Negative for dizziness, syncope and weakness.   Hematological: Negative for adenopathy. Does not bruise/bleed easily.   Psychiatric/Behavioral: Negative for confusion, decreased concentration and hallucinations.        Past Medical History:   Diagnosis Date   • History of cholecystectomy 09/2019        History reviewed. No pertinent surgical history.    History reviewed. No pertinent family history.    Social History     Tobacco Use   • Smoking status: Never Smoker   • Smokeless tobacco: Never Used   Substance Use Topics   • Alcohol use: Never     Frequency: Never   • Drug use: Never       ALLERGIES: no known allergies.    Valsv-ad-Rvoquas Medications  No medications prior to admission.       Scheduled Medications  Current Facility-Administered Medications   Medication   • hydrALAZINE (APRESOLINE) injection 5 mg   • potassium CHLORIDE (KLOR-CON M) phyllis ER tablet 40 mEq   • amLODIPine (NORVASC) tablet 5 mg   • morphine injection 4 mg   • sodium chloride 0.9 % flush bag 25 mL   • sodium chloride (PF) 0.9 % injection 2 mL   • lactated ringers infusion   • acetaminophen (TYLENOL) tablet 650 mg   • ondansetron (ZOFRAN) injection 4 mg   • famotidine (PEPCID) injection 20 mg       Continuous Infusions  • lactated ringers infusion 75 mL/hr at 07/21/20 1234       Objective     Vital Signs  BP (!) 160/81 (BP Location: RUE - Right upper extremity, Patient Position: Supine)   Pulse 90   Temp 97.9 °F (36.6 °C) (Oral)   Resp 16   Ht 5' 3\" (1.6 m)   Wt 58 kg (127 lb 13.9 oz)   BMI 22.65  kg/m²   BSA 1.6 m²     Physical Exam  Constitutional:       General: She is awake. She is not in acute distress.     Appearance: She is not ill-appearing or toxic-appearing.   HENT:      Mouth/Throat:      Mouth: Mucous membranes are moist.      Pharynx: No oropharyngeal exudate or posterior oropharyngeal erythema.   Eyes:      General: No scleral icterus.  Neck:      Vascular: No JVD.   Cardiovascular:      Rate and Rhythm: Normal rate and regular rhythm.      Heart sounds: Normal heart sounds. No murmur.   Pulmonary:      Breath sounds: Normal breath sounds and air entry.   Chest:      Chest wall: There is no dullness to percussion.   Abdominal:      General: Abdomen is flat. Bowel sounds are normal. There is no distension.      Palpations: Abdomen is soft. There is no shifting dullness, fluid wave, hepatomegaly, splenomegaly, mass or pulsatile mass.      Tenderness: There is abdominal tenderness in the right upper quadrant. There is no guarding or rebound.      Hernia: No hernia is present. There is no hernia in the right inguinal area or left inguinal area.   Genitourinary:     Rectum: No mass, tenderness, anal fissure or external hemorrhoid. Normal anal tone.   Musculoskeletal:      Right lower leg: No edema.      Left lower leg: No edema.   Lymphadenopathy:      Cervical: No cervical adenopathy.      Upper Body:      Right upper body: No supraclavicular adenopathy.      Left upper body: No supraclavicular adenopathy.      Lower Body: No right inguinal adenopathy. No left inguinal adenopathy.   Skin:     General: Skin is warm and dry.      Findings: No bruising, ecchymosis, erythema or petechiae.      Nails: There is no clubbing.     Neurological:      Mental Status: She is alert and oriented to person, place, and time.      Cranial Nerves: No cranial nerve deficit.   Psychiatric:         Attention and Perception: Attention normal.         Behavior: Behavior normal.          Intake and Output    Intake/Output  Summary (Last 24 hours) at 7/21/2020 1849  Last data filed at 7/21/2020 0639  Gross per 24 hour   Intake 564.72 ml   Output --   Net 564.72 ml       Pertinent Laboratory and Diagnostic Studies:  Lab Results   Component Value Date/Time    HGB 13.0 07/21/2020 05:07 AM     07/21/2020 05:07 AM    WBC 4.6 07/21/2020 05:07 AM    INR 1.1 04/23/2020 05:30 AM    BUN 8 07/21/2020 05:07 AM    CREATININE 0.60 07/21/2020 05:07 AM    AST 21 07/21/2020 05:07 AM    BILIRUBIN 0.6 07/21/2020 05:07 AM    ALBUMIN 3.3 (L) 07/21/2020 05:07 AM    LIPA 280 07/21/2020 05:07 AM       CT ABDOMEN PELVIS W CONTRAST   Final Result   1.  Stable CT with biliary stent in satisfactory position.  Moderate to severe intrahepatic and proximal extrahepatic biliary dilatation is unchanged.   2.  Stigmata of chronic pancreatitis are stable.   3.  Large uterine fibroid is unchanged.  It measures approximately 7 cm.      Electronically Signed by: YUNG MCNAIR M.D.    Signed on: 7/20/2020 6:04 PM          ERCP    (Results Pending)       All labs and imaging were personally reviewed.  Please see the chart for details or below for specific positive findings.    Assessment and Plan     Michaela Dhaliwal is a 58 year old female with a history of chronic pancreatitis (unknown etiology) complicated by a biliary stricture status post ERCP with stent basement (first 1 November 2019, second time April 2020).    1.  Right upper quadrant pain: Unclear etiology.  Transaminases and liver function tests are unremarkable.  No evidence of acute pancreatitis on exam.  She has had a cholecystectomy in the past.    2.  Biliary stricture with intrahepatic and extrahepatic biliary ductal dilatation: Status post stent exchange in April 2020.    3.  Chronic pancreatitis: No clear etiology.  Does not drink alcohol.  No family history of pancreatitis.    Recommendations:  -Planning for ERCP with stent exchange on Thursday, 7/23/2020  -In the meantime, follow-up on  CA-19-9  -Monitor transaminases and LFTs  -Regular diet for now  -Follow-up on COVID-19 test  -We will need preop evaluation per primary team  -We will continue to follow    Guadalupe Gallegos MD  GI Fellow, PGY-4

## 2024-08-06 DIAGNOSIS — G47.00 INSOMNIA, UNSPECIFIED TYPE: ICD-10-CM

## 2024-08-06 RX ORDER — TRAZODONE HYDROCHLORIDE 50 MG/1
25-50 TABLET, FILM COATED ORAL AT BEDTIME
Qty: 90 TABLET | Refills: 1 | Status: SHIPPED | OUTPATIENT
Start: 2024-08-06

## 2024-09-04 NOTE — PROGRESS NOTES
Preoperative Evaluation  [facility]  [address]  [place] [address]  Phone: [phone]  Primary Provider: Jason Leal DO  Pre-op Performing Provider: Jason Leal DO  Sep 5, 2024             9/5/2024   Surgical Information   What procedure is being done? knee replacement Right   Facility or Hospital where procedure[facility]surgery [facility]   Who is doing the procedure / surgery? doctor  chuck   Date of surgery / procedure: september 27   Time of surgery / procedure: 7   Where do you plan to recover after surgery? at home with family        Fax number for surgical facility: Note does not need to be faxed, will be available electronically in Epic.    Assessment & Plan     The proposed surgical procedure is considered INTERMEDIATE risk.    Problem List Items Addressed This Visit    None  Visit Diagnoses       Pre-op exam    -  Primary    Relevant Orders    EKG 12-lead complete w/read - (Clinic Performed) (Completed)    Panic attack        Relevant Orders    TSH with free T4 reflex                    - No identified additional risk factors other than previously addressed    Antiplatelet or Anticoagulation Medication Instructions   - Patient is on no antiplatelet or anticoagulation medications.    Additional Medication Instructions  Take your metoprolol morning of the procedure.  Overall multivitamins supplements 1 week prior to procedure including fish oil.    Recommendation  Approval given to proceed with proposed procedure, without further diagnostic evaluation.    Mayda Pang is a 64 year old, presenting for the following:  Pre-Op Exam           No data to display              HPI related to upcoming procedure: Right total knee replacement        9/5/2024   Pre-Op Questionnaire   Have you ever had a heart attack or stroke? No   Have you ever had surgery on your heart or blood vessels, such as a stent placement, a coronary  artery bypass, or surgery on an artery in your head, neck, heart, or legs? No   Do you have chest pain with activity? No   Do you have a history of heart failure? No   Do you currently have a cold, bronchitis or symptoms of other infection? No   Do you have a cough, shortness of breath, or wheezing? No   Do you or anyone in your family have previous history of blood clots? No   Do you or does anyone in your family have a serious bleeding problem such as prolonged bleeding following surgeries or cuts? No   Have you ever had problems with anemia or been told to take iron pills? No   Have you had any abnormal blood loss such as black, tarry or bloody stools? No   Have you ever had a blood transfusion? No   Are you willing to have a blood transfusion if it is medically needed before, during, or after your surgery? Yes   Have you or any of your relatives ever had problems with anesthesia? No   Do you have sleep apnea, excessive snoring or daytime drowsiness? No   Do you have any artifical heart valves or other implanted medical devices like a pacemaker, defibrillator, or continuous glucose monitor? No   Do you have artificial joints? No   Are you allergic to latex? No        Health Care Directive  Patient does not have a Health Care Directive or Living Will: Discussed advance care planning with patient; however, patient declined at this time.    6}    Status of Chronic Conditions:  HYPERLIPIDEMIA - Patient has a long history of significant Hyperlipidemia requiring medication for treatment with recent fair control. Patient reports no problems or side effects with the medication.     HYPERTENSION - Patient has longstanding history of HTN , currently denies any symptoms referable to elevated blood pressure. Specifically denies chest pain, palpitations, dyspnea, orthopnea, PND or peripheral edema. Blood pressure readings have been in normal range. Current medication regimen is as listed below. Patient denies any side effects  of medication.     Anxiety    Parkinson's Disease    Pre Diabetes    Patient Active Problem List    Diagnosis Date Noted    Anxiety 03/29/2022     Priority: Medium    Prediabetes 03/18/2022     Priority: Medium    Tremor 03/18/2022     Priority: Medium    Hyperlipidemia LDL goal <100 12/07/2020     Priority: Medium    Encounter for monitoring statin therapy 12/07/2020     Priority: Medium    Benign essential hypertension 06/03/2020     Priority: Medium    Benign non-nodular prostatic hyperplasia with lower urinary tract symptoms 08/29/2016     Priority: Medium      Past Medical History:   Diagnosis Date    Encounter for monitoring statin therapy 12/07/2020    HTN (hypertension)     Hyperlipidemia LDL goal <100 12/07/2020     Past Surgical History:   Procedure Laterality Date    APPENDECTOMY      GENITOURINARY SURGERY      circ    SOFT TISSUE SURGERY      wisdom teeth extracted     Current Outpatient Medications   Medication Sig Dispense Refill    carbidopa-levodopa (SINEMET)  MG tablet TAKE 1 TABLET BY MOUTH THREE TIMES DAILY      losartan (COZAAR) 100 MG tablet TAKE 1 TABLET(100 MG) BY MOUTH DAILY 90 tablet 1    metoprolol succinate ER (TOPROL XL) 100 MG 24 hr tablet TAKE 1 TABLET(100 MG) BY MOUTH TWICE DAILY 180 tablet 2    omeprazole (PRILOSEC) 40 MG DR capsule Take 1 capsule (40 mg) by mouth daily 90 capsule 1    sertraline (ZOLOFT) 25 MG tablet TAKE 1 TABLET(25 MG) BY MOUTH DAILY 90 tablet 3    tamsulosin (FLOMAX) 0.4 MG capsule Take 0.4 mg by mouth daily      traZODone (DESYREL) 50 MG tablet TAKE 1/2 TO 1 TABLET(25 TO 50 MG) BY MOUTH AT BEDTIME 90 tablet 1    zinc 50 MG TABS       Omega-3 Fatty Acids (OMEGA 3 PO) Take 1 tablet by mouth daily (Patient not taking: Reported on 7/17/2024)         Allergies   Allergen Reactions    Atorvastatin      Insomnia         Social History     Tobacco Use    Smoking status: Never    Smokeless tobacco: Never   Substance Use Topics    Alcohol use: Yes     Family History  "  Problem Relation Age of Onset    Other Cancer Father         brain tumor- at 52    Diabetes No family hx of     Thyroid Disease No family hx of     Asthma No family hx of      History   Drug Use No             Review of Systems  Constitutional, HEENT, cardiovascular, pulmonary, GI, , musculoskeletal, neuro, skin, endocrine and psych systems are negative, except as otherwise noted.    Objective    BP (!) 144/92 (BP Location: Left arm, Patient Position: Sitting, Cuff Size: Adult Large)   Pulse 77   Temp 97.9  F (36.6  C) (Tympanic)   Resp 20   Wt 92.1 kg (203 lb)   SpO2 95%   BMI 27.53 kg/m     Estimated body mass index is 27.53 kg/m  as calculated from the following:    Height as of 3/29/22: 1.829 m (6').    Weight as of this encounter: 92.1 kg (203 lb).  Physical Exam  GENERAL: alert and no distress  EYES: Eyes grossly normal to inspection, PERRL and conjunctivae and sclerae normal  HENT: ear canals and TM's normal, nose and mouth without ulcers or lesions  RESP: lungs clear to auscultation - no rales, rhonchi or wheezes  CV: regular rate and rhythm, normal S1 S2, no S3 or S4, no murmur, click or rub, no peripheral edema  ABDOMEN: soft, nontender, no hepatosplenomegaly, no masses and bowel sounds normal  MS: no gross musculoskeletal defects noted, no edema  SKIN: no suspicious lesions or rashes  PSYCH: Pleasant, appropriate slightly anxious.    No results for input(s): \"HGB\", \"PLT\", \"INR\", \"NA\", \"POTASSIUM\", \"CR\", \"A1C\" in the last 8760 hours.     Diagnostics     Component  Ref Range & Units 6 d ago   Sodium  134 - 143 mEq/L 135   Potassium  3.4 - 5.1 mEq/L 4.0   Chloride  99 - 110 mEq/L 104   Carbon Dioxide  19 - 29 mEq/L 23   Anion Gap  3.0 - 15.0 mEq/L 8.0   Blood Urea Nitrogen  5 - 24 mg/dL 17   Creatinine  0.70 - 1.20 mg/dL 1.14   Glomerular Filtration Rate  >60 mL/min/1.73 m*2 73   Comment: Risk of cardiovascular disease increases when GFR is abnormal; persistently reduced GFR values are a " specific indication of CKD. This calculation uses CKD-EPI 2021 equation without adjustment for race; it has not been validated in pregnant women.   Calcium  8.4 - 10.5 mg/dL 9.5   Glucose  70 - 99 mg/dL 169 High    Protein, Total  6.0 - 8.0 g/dL 7.2   Albumin  3.5 - 5.0 g/dL 4.0   Alkaline Phosphatase  40 - 150 IU/L 51   Aspartate Aminotransferase  10 - 40 IU/L 19   Alanine Aminotransferase  6 - 40 IU/L 16   Bilirubin, Total  0.2 - 1.2 mg/dL 0.6   omponent  Ref Range & Units6 d agoWBC  3.2 - 11.0 10*9/L7.3RBC  4.14 - 5.76 10*12/L4.86HGB  12.9 - 16.9 g/dL15.0HCT  38.4 - 49.7 %43.4MCV  81.4 - 99.0 fL89.3MCH  26.7 - 33.1 pg30.9MCHC  31.6 - 35.5 g/dL34.6RDW  11.3 - 14.6 %11.7PLT  130 - 375 10*9/S409Xcrerlnptah %  %66.7Lymphocytes %  %21.7Monocytes %  %9.1Eosinophils %  %1.4Basophils %  %0.7Immature Granulocytes %  %0.4Neutrophils Absolute  1.5 - 7.6 10*9/L4.9Lymphocytes Absolute  0.8 - 3.3 10*9/L1.6Monocytes Absolute  0.2 - 0.9 10*9/L0.7Eosinophils Absolute  0.0 - 0.4 10*9/L0.1Basophils Absolute  0.0 - 0.1 10*9/L0.1Immature Granulocytes Absolute  0.00 - 0.06 10*9/L0.03Resulting [facility]      EKG: appears normal, NSR, rate 73    Revised Cardiac Risk Index (RCRI)  The patient has the following serious cardiovascular risks for perioperative complications:   - No serious cardiac risks = 0 points     RCRI Interpretation: 0 points: Class I (very low risk - 0.4% complication rate)         Signed Electronically by: Jason Leal, DO  A copy of this evaluation report is provided to the requesting physician.       0927/24 TKA - RT

## 2024-09-05 ENCOUNTER — OFFICE VISIT (OUTPATIENT)
Dept: INTERNAL MEDICINE | Facility: OTHER | Age: 64
End: 2024-09-05
Attending: INTERNAL MEDICINE
Payer: COMMERCIAL

## 2024-09-05 VITALS
DIASTOLIC BLOOD PRESSURE: 92 MMHG | WEIGHT: 203 LBS | BODY MASS INDEX: 27.53 KG/M2 | TEMPERATURE: 97.9 F | HEART RATE: 77 BPM | RESPIRATION RATE: 20 BRPM | OXYGEN SATURATION: 95 % | SYSTOLIC BLOOD PRESSURE: 144 MMHG

## 2024-09-05 DIAGNOSIS — M79.10 MYALGIA: ICD-10-CM

## 2024-09-05 DIAGNOSIS — F41.0 PANIC ATTACK: ICD-10-CM

## 2024-09-05 DIAGNOSIS — Z01.818 PRE-OP EXAM: Primary | ICD-10-CM

## 2024-09-05 LAB
HOLD SPECIMEN: NORMAL
TSH SERPL DL<=0.005 MIU/L-ACNC: 1.31 UIU/ML (ref 0.3–4.2)

## 2024-09-05 PROCEDURE — G0463 HOSPITAL OUTPT CLINIC VISIT: HCPCS

## 2024-09-05 PROCEDURE — 93005 ELECTROCARDIOGRAM TRACING: CPT | Performed by: INTERNAL MEDICINE

## 2024-09-05 PROCEDURE — 86618 LYME DISEASE ANTIBODY: CPT | Mod: ZL | Performed by: INTERNAL MEDICINE

## 2024-09-05 PROCEDURE — 84443 ASSAY THYROID STIM HORMONE: CPT | Mod: ZL | Performed by: INTERNAL MEDICINE

## 2024-09-05 PROCEDURE — 93010 ELECTROCARDIOGRAM REPORT: CPT | Performed by: INTERNAL MEDICINE

## 2024-09-05 PROCEDURE — 99214 OFFICE O/P EST MOD 30 MIN: CPT | Performed by: INTERNAL MEDICINE

## 2024-09-05 PROCEDURE — 36415 COLL VENOUS BLD VENIPUNCTURE: CPT | Mod: ZL | Performed by: INTERNAL MEDICINE

## 2024-09-05 ASSESSMENT — PAIN SCALES - GENERAL: PAINLEVEL: NO PAIN (0)

## 2024-09-05 NOTE — Clinical Note
We do need to remind him to take his metoprolol morning of the procedure with small sip of water.  Otherwise he is to hold all supplements and multivitamins 1 week prior including NSAIDs.

## 2024-09-06 LAB
ATRIAL RATE - MUSE: 73 BPM
B BURGDOR IGG+IGM SER QL: 0.05
DIASTOLIC BLOOD PRESSURE - MUSE: NORMAL MMHG
INTERPRETATION ECG - MUSE: NORMAL
P AXIS - MUSE: 45 DEGREES
PR INTERVAL - MUSE: 178 MS
QRS DURATION - MUSE: 88 MS
QT - MUSE: 392 MS
QTC - MUSE: 431 MS
R AXIS - MUSE: 0 DEGREES
SYSTOLIC BLOOD PRESSURE - MUSE: NORMAL MMHG
T AXIS - MUSE: 13 DEGREES
VENTRICULAR RATE- MUSE: 73 BPM

## 2024-09-15 DIAGNOSIS — I10 BENIGN ESSENTIAL HYPERTENSION: ICD-10-CM

## 2024-09-16 RX ORDER — LOSARTAN POTASSIUM 100 MG/1
100 TABLET ORAL DAILY
Qty: 90 TABLET | Refills: 3 | Status: SHIPPED | OUTPATIENT
Start: 2024-09-16

## 2024-09-16 RX ORDER — METOPROLOL SUCCINATE 100 MG/1
100 TABLET, EXTENDED RELEASE ORAL 2 TIMES DAILY
Qty: 180 TABLET | Refills: 3 | Status: SHIPPED | OUTPATIENT
Start: 2024-09-16

## 2024-09-16 NOTE — TELEPHONE ENCOUNTER
LOSARTAN 100MG TABLETS         Last Written Prescription Date:  4/3/24  Last Fill Quantity: 90,   # refills: 1  Last Office Visit: 9/5/24  Future Office visit:       Routing refill request to provider for review/approval because:    Angiotensin-II Receptors Eoukqu11/15/2024 08:00 AM   Protocol Details Last blood pressure under 140/90 in past 12 months    Has GFR on file in past 12 months and most recent value is normal    Recent (12 mo) or future (90days) visit within the authorizing provider's specialty    Normal serum potassium on file in past 12 months        BP Readings from Last 3 Encounters:   09/05/24 (!) 144/92   07/17/24 138/84   02/26/24 136/86     Potassium   Date Value Ref Range Status   03/13/2023 4.8 3.4 - 5.3 mmol/L Final   03/08/2022 4.2 3.4 - 5.3 mmol/L Final   11/23/2020 3.8 3.4 - 5.3 mmol/L Final     GFR Estimate   Date Value Ref Range Status   03/13/2023 84 >60 mL/min/1.73m2 Final     Comment:     eGFR calculated using 2021 CKD-EPI equation.   11/23/2020 88 >60 mL/min/[1.73_m2] Final     Comment:     Non  GFR Calc  Starting 12/18/2018, serum creatinine based estimated GFR (eGFR) will be   calculated using the Chronic Kidney Disease Epidemiology Collaboration   (CKD-EPI) equation.

## 2024-09-16 NOTE — TELEPHONE ENCOUNTER
METOPROLOL ER SUCCINATE 100MG TABS         Last Written Prescription Date:  1/22/24  Last Fill Quantity: 180,   # refills: 2  Last Office Visit: 9/5/24  Future Office visit:       Routing refill request to provider for review/approval because:  Beta-Blockers Protocol Iliwlb53/15/2024 08:00 AM   Protocol Details Blood pressure under 140/90 in past 12 months    Recent (12 mo) or future (90 days) visit within the authorizing provider's specialty     BP Readings from Last 3 Encounters:   09/05/24 (!) 144/92   07/17/24 138/84   02/26/24 136/86

## 2024-09-19 ENCOUNTER — ANESTHESIA EVENT (OUTPATIENT)
Dept: SURGERY | Facility: HOSPITAL | Age: 64
End: 2024-09-19
Payer: COMMERCIAL

## 2024-09-19 NOTE — ANESTHESIA PREPROCEDURE EVALUATION
Anesthesia Pre-Procedure Evaluation    Patient: Bird Cedeno   MRN: [id number] : 1960        Procedure : Procedure(s):  Right Total Knee Replacement          Past Medical History:   Diagnosis Date     Encounter for monitoring statin therapy 2020     HTN (hypertension)      Hyperlipidemia LDL goal <100 2020      Past Surgical History:   Procedure Laterality Date     APPENDECTOMY       GENITOURINARY SURGERY      circ     SOFT TISSUE SURGERY      wisdom teeth extracted      Allergies   Allergen Reactions     Atorvastatin      Insomnia       Social History     Tobacco Use     Smoking status: Never     Smokeless tobacco: Never   Substance Use Topics     Alcohol use: Yes      Wt Readings from Last 1 Encounters:   24 92.1 kg (203 lb)        Anesthesia Evaluation   Pt has had prior anesthetic. Type: General.        ROS/MED HX  ENT/Pulmonary:  - neg pulmonary ROS  (-) tobacco use   Neurologic:     (+)                 Parkinson's disease, features: tremors,              Cardiovascular:     (+)  hypertension- -   -  - -                                 Previous cardiac testing   Echo: Date: Results:    Stress Test:  Date:  Results:  Baseline electrocardiogram demonstrates sinus rhythm.   The stress electrocardiogram is negative for inducible ischemic EKG changes. There were no arrhythmias during stress. There were no arrhythmias during recovery.     ECG Reviewed:  Date: 24 Results:  Sinus rhythm   Normal ECG   No previous ECGs available     Cath:  Date: Results:      METS/Exercise Tolerance: >4 METS    Hematologic:       Musculoskeletal:       GI/Hepatic:     (+) GERD, Asymptomatic on medication,                  Renal/Genitourinary:       Endo:       Psychiatric/Substance Use:     (+) psychiatric history anxiety and depression       Infectious Disease:       Malignancy:       Other:  - neg other ROS    (+)  , H/O Chronic Pain,         Physical Exam    Airway        Mallampati: II   TM  "distance: > 3 FB   Neck ROM: full   Mouth opening: > 3 cm    Respiratory Devices and Support         Dental       (+) Minor Abnormalities - some fillings, tiny chips      Cardiovascular          Rhythm and rate: regular and normal     Pulmonary           breath sounds clear to auscultation       OUTSIDE LABS:  CBC:   Lab Results   Component Value Date    WBC 6.7 03/13/2023    WBC 6.7 05/08/2020    HGB 15.3 03/13/2023    HGB 15.3 05/08/2020    HCT 45.1 03/13/2023    HCT 45.2 05/08/2020     03/13/2023     05/08/2020     BMP:   Lab Results   Component Value Date     03/13/2023     03/08/2022    POTASSIUM 4.8 03/13/2023    POTASSIUM 4.2 03/08/2022    CHLORIDE 99 03/13/2023    CHLORIDE 106 03/08/2022    CO2 25 03/13/2023    CO2 24 03/08/2022    BUN 15.6 03/13/2023    BUN 16 03/08/2022    CR 1.02 03/13/2023    CR 0.88 03/08/2022     (H) 03/13/2023     (H) 03/08/2022     COAGS: No results found for: \"PTT\", \"INR\", \"FIBR\"  POC: No results found for: \"BGM\", \"HCG\", \"HCGS\"  HEPATIC:   Lab Results   Component Value Date    ALBUMIN 4.4 03/13/2023    PROTTOTAL 7.3 03/13/2023    ALT 46 03/13/2023    AST 21 03/13/2023    ALKPHOS 58 03/13/2023    BILITOTAL 0.5 03/13/2023     OTHER:   Lab Results   Component Value Date    A1C 6.0 (H) 03/08/2022    FLORY 10.2 03/20/2023    MAG 2.3 05/08/2020    TSH 1.31 09/05/2024       Anesthesia Plan    ASA Status:  3    NPO Status:  NPO Appropriate    Anesthesia Type: Spinal.              Consents    Anesthesia Plan(s) and associated risks, benefits, and realistic alternatives discussed. Questions answered and patient/representative(s) expressed understanding.     - Discussed: Risks, Benefits and Alternatives for BOTH SEDATION and the PROCEDURE were discussed     - Discussed with:  Patient      - Specific Concerns: Discussed risks and benefits of spinal anesthetic with patient including itching, sore back, infection, hematoma, spinal headache, CV complications, " nerve damage, inability to place, conversion to general anesthesia, loss of airway, and death. Pt wishes to proceed..     - Extended Intubation/Ventilatory Support Discussed: No.      - Patient is DNR/DNI Status: No     Use of blood products discussed: Yes.     - Discussed with: Patient.     Postoperative Care    Pain management: Peripheral nerve block (Single Shot).        Comments:               KELSEA Wallace CRNA    I have reviewed the pertinent notes and labs in the chart from the past 30 days and (re)examined the patient.  Any updates or changes from those notes are reflected in this note.

## 2024-09-20 NOTE — OR NURSING
At an ER visist patient was noted to have elevated /130's and at pre-op BP also elevated 144/92; per CRNA they wanted patient's BP rechecked.  Patient is aware of this and will try to go to the clinic for a BP recheck and/or test it at home.  Patient given PAT number to call back with readings.  Patient verbalized understanding and has no further questions and/or concerns at this time.

## 2024-09-23 ENCOUNTER — TELEPHONE (OUTPATIENT)
Dept: INTERNAL MEDICINE | Facility: OTHER | Age: 64
End: 2024-09-23

## 2024-09-23 ENCOUNTER — DOCUMENTATION ONLY (OUTPATIENT)
Dept: INTERNAL MEDICINE | Facility: OTHER | Age: 64
End: 2024-09-23

## 2024-09-23 NOTE — TELEPHONE ENCOUNTER
Form received 9-23-24 ,placed in provider's wall bin.   After form is completed patient would like form to be FILLED OUT AND CALLED WHEN COMPLETED. PATIENT WILL  FORM.

## 2024-09-23 NOTE — OR NURSING
Patient called and was able to schedule a blood pressure check at the [facility] for Wednesday afternoon.

## 2024-09-25 ENCOUNTER — ALLIED HEALTH/NURSE VISIT (OUTPATIENT)
Dept: FAMILY MEDICINE | Facility: OTHER | Age: 64
End: 2024-09-25
Attending: INTERNAL MEDICINE
Payer: COMMERCIAL

## 2024-09-25 VITALS — HEART RATE: 80 BPM | SYSTOLIC BLOOD PRESSURE: 168 MMHG | DIASTOLIC BLOOD PRESSURE: 112 MMHG | OXYGEN SATURATION: 97 %

## 2024-09-25 DIAGNOSIS — I10 BENIGN ESSENTIAL HYPERTENSION: Primary | ICD-10-CM

## 2024-09-25 DIAGNOSIS — I10 PRIMARY HYPERTENSION: Primary | ICD-10-CM

## 2024-09-25 RX ORDER — AMLODIPINE BESYLATE 10 MG/1
10 TABLET ORAL DAILY
Qty: 30 TABLET | Refills: 2 | Status: SHIPPED | OUTPATIENT
Start: 2024-09-25

## 2024-09-25 ASSESSMENT — PAIN SCALES - GENERAL: PAINLEVEL: MODERATE PAIN (4)

## 2024-09-25 NOTE — PROGRESS NOTES
Patient presents to clinic today for blood pressure check per Dr. Leal.    LOV: 9/5/24 with Dr. Leal Pre-op. /92.  Recheck for surgery.    Current Outpatient Medications   Medication Sig Dispense Refill    carbidopa-levodopa (SINEMET)  MG tablet Take 1.5 tablets by mouth 4 times daily.      losartan (COZAAR) 100 MG tablet TAKE 1 TABLET(100 MG) BY MOUTH DAILY 90 tablet 3    metoprolol succinate ER (TOPROL XL) 100 MG 24 hr tablet TAKE 1 TABLET(100 MG) BY MOUTH TWICE DAILY 180 tablet 3    Omega-3 Fatty Acids (OMEGA 3 PO) Take 1 tablet by mouth daily (Patient not taking: Reported on 7/17/2024)      omeprazole (PRILOSEC) 40 MG DR capsule Take 1 capsule (40 mg) by mouth daily 90 capsule 1    sertraline (ZOLOFT) 25 MG tablet TAKE 1 TABLET(25 MG) BY MOUTH DAILY 90 tablet 3    tamsulosin (FLOMAX) 0.4 MG capsule Take 0.4 mg by mouth daily      traZODone (DESYREL) 50 MG tablet TAKE 1/2 TO 1 TABLET(25 TO 50 MG) BY MOUTH AT BEDTIME 90 tablet 1    zinc 50 MG TABS        No current facility-administered medications for this visit.

## 2024-09-25 NOTE — PROGRESS NOTES
Patient presents to Clinic Nurse Only BP Check per Dr. Leal.     Application for Disability Form reviewed, signed by Dr. Leal and provided to patient, upcoming TKA - RT.         Current Outpatient Medications   Medication Sig Dispense Refill    carbidopa-levodopa (SINEMET)  MG tablet Take 1.5 tablets by mouth 4 times daily.      losartan (COZAAR) 100 MG tablet TAKE 1 TABLET(100 MG) BY MOUTH DAILY 90 tablet 3    metoprolol succinate ER (TOPROL XL) 100 MG 24 hr tablet TAKE 1 TABLET(100 MG) BY MOUTH TWICE DAILY 180 tablet 3    Omega-3 Fatty Acids (OMEGA 3 PO) Take 1 tablet by mouth daily (Patient not taking: Reported on 7/17/2024)      omeprazole (PRILOSEC) 40 MG DR capsule Take 1 capsule (40 mg) by mouth daily 90 capsule 1    sertraline (ZOLOFT) 25 MG tablet TAKE 1 TABLET(25 MG) BY MOUTH DAILY 90 tablet 3    tamsulosin (FLOMAX) 0.4 MG capsule Take 0.4 mg by mouth daily      traZODone (DESYREL) 50 MG tablet TAKE 1/2 TO 1 TABLET(25 TO 50 MG) BY MOUTH AT BEDTIME 90 tablet 1    zinc 50 MG TABS        No current facility-administered medications for this visit.     BP Readings from Last 3 Encounters:   09/25/24 (!) 162/110   09/05/24 (!) 144/92   07/17/24 138/84     Spoke with Dr. Leal, start Norvasc 10 mg one time daily, rx sent to pharmacy. Recheck BP in 2 weeks. Appointment scheduled 10/8/24 at 300 pm - ok for patient to arrive at 1000 am.     Go to ER for any chest pain, shortness of breath, sudden cold sweat, headache, nausea, visual changes/disturbances or any additional medical concerns. Patient verbalized understanding.

## 2024-09-26 NOTE — OR NURSING
Patient called and notified surgery will have to be rescheduled as his blood pressure at blood pressure check was 162/110.   Consulted with LIUDMILA Katz and he agrees patient needs blood under better control.    Patient was scheduled on 9/27 with Amrita for right total knee replacement.   Patient has follow up on 10/8 for blood pressure recheck with PCP.   Message to scheduling team.

## 2024-09-27 ENCOUNTER — ANESTHESIA (OUTPATIENT)
Dept: SURGERY | Facility: HOSPITAL | Age: 64
End: 2024-09-27
Payer: COMMERCIAL

## 2024-10-02 NOTE — PROGRESS NOTES
Patient presents to clinic today for blood pressure check per Dr. Leal.    LOV: 9/25/24 nurse BP check. BP Check 162/110.  Per note:  start Norvasc 10 mg one time daily, rx sent to pharmacy. Recheck BP in 2 weeks. Appointment scheduled 10/8/24 at 300 pm - ok for patient to arrive at 1000 am     Current Outpatient Medications   Medication Sig Dispense Refill    amLODIPine (NORVASC) 10 MG tablet Take 1 tablet (10 mg) by mouth daily. 30 tablet 2    carbidopa-levodopa (SINEMET)  MG tablet Take 1.5 tablets by mouth 4 times daily.      losartan (COZAAR) 100 MG tablet TAKE 1 TABLET(100 MG) BY MOUTH DAILY 90 tablet 3    metoprolol succinate ER (TOPROL XL) 100 MG 24 hr tablet TAKE 1 TABLET(100 MG) BY MOUTH TWICE DAILY 180 tablet 3    Omega-3 Fatty Acids (OMEGA 3 PO) Take 1 tablet by mouth daily (Patient not taking: Reported on 7/17/2024)      omeprazole (PRILOSEC) 40 MG DR capsule Take 1 capsule (40 mg) by mouth daily 90 capsule 1    sertraline (ZOLOFT) 25 MG tablet TAKE 1 TABLET(25 MG) BY MOUTH DAILY 90 tablet 3    tamsulosin (FLOMAX) 0.4 MG capsule Take 0.4 mg by mouth daily      traZODone (DESYREL) 50 MG tablet TAKE 1/2 TO 1 TABLET(25 TO 50 MG) BY MOUTH AT BEDTIME 90 tablet 1    zinc 50 MG TABS        No current facility-administered medications for this visit.     BP Readings from Last 3 Encounters:   10/08/24 125/80   09/25/24 (!) 168/112   09/05/24 (!) 144/92       10/8/2024 10:04 AM    /80   BP Location Left arm   Patient Position Sitting   Cuff Size Adult Regular   Pulse 79   SpO2 95 %

## 2024-10-08 ENCOUNTER — ALLIED HEALTH/NURSE VISIT (OUTPATIENT)
Dept: FAMILY MEDICINE | Facility: OTHER | Age: 64
End: 2024-10-08
Attending: INTERNAL MEDICINE
Payer: COMMERCIAL

## 2024-10-08 VITALS — SYSTOLIC BLOOD PRESSURE: 125 MMHG | HEART RATE: 79 BPM | DIASTOLIC BLOOD PRESSURE: 80 MMHG | OXYGEN SATURATION: 95 %

## 2024-10-08 DIAGNOSIS — I10 PRIMARY HYPERTENSION: ICD-10-CM

## 2024-10-08 DIAGNOSIS — I10 BENIGN ESSENTIAL HYPERTENSION: Primary | ICD-10-CM

## 2024-10-08 RX ORDER — AMLODIPINE BESYLATE 10 MG/1
10 TABLET ORAL DAILY
Qty: 30 TABLET | Refills: 2 | Status: CANCELLED | OUTPATIENT
Start: 2024-10-08

## 2024-10-08 RX ORDER — AMLODIPINE BESYLATE 10 MG/1
10 TABLET ORAL DAILY
Qty: 90 TABLET | Refills: 2 | Status: SHIPPED | OUTPATIENT
Start: 2024-10-08

## 2024-10-15 ENCOUNTER — OFFICE VISIT (OUTPATIENT)
Dept: INTERNAL MEDICINE | Facility: OTHER | Age: 64
End: 2024-10-15
Attending: INTERNAL MEDICINE
Payer: COMMERCIAL

## 2024-10-15 VITALS
WEIGHT: 213 LBS | SYSTOLIC BLOOD PRESSURE: 139 MMHG | DIASTOLIC BLOOD PRESSURE: 87 MMHG | RESPIRATION RATE: 20 BRPM | BODY MASS INDEX: 28.89 KG/M2 | OXYGEN SATURATION: 96 % | HEART RATE: 84 BPM | TEMPERATURE: 98.5 F

## 2024-10-15 DIAGNOSIS — I10 BENIGN ESSENTIAL HYPERTENSION: ICD-10-CM

## 2024-10-15 DIAGNOSIS — Z01.818 PRE-OP EXAM: Primary | ICD-10-CM

## 2024-10-15 DIAGNOSIS — G20.A2 PARKINSON'S DISEASE WITHOUT DYSKINESIA, WITH FLUCTUATING MANIFESTATIONS (H): ICD-10-CM

## 2024-10-15 DIAGNOSIS — E78.5 HYPERLIPIDEMIA LDL GOAL <100: ICD-10-CM

## 2024-10-15 PROBLEM — G20.A1 PARKINSON DISEASE (H): Status: ACTIVE | Noted: 2024-10-15

## 2024-10-15 LAB
ANION GAP SERPL CALCULATED.3IONS-SCNC: 12 MMOL/L (ref 7–15)
BASOPHILS # BLD AUTO: 0.1 10E3/UL (ref 0–0.2)
BASOPHILS NFR BLD AUTO: 1 %
BUN SERPL-MCNC: 18 MG/DL (ref 8–23)
CALCIUM SERPL-MCNC: 9.6 MG/DL (ref 8.8–10.4)
CHLORIDE SERPL-SCNC: 98 MMOL/L (ref 98–107)
CREAT SERPL-MCNC: 1.31 MG/DL (ref 0.67–1.17)
EGFRCR SERPLBLD CKD-EPI 2021: 61 ML/MIN/1.73M2
EOSINOPHIL # BLD AUTO: 0.2 10E3/UL (ref 0–0.7)
EOSINOPHIL NFR BLD AUTO: 3 %
ERYTHROCYTE [DISTWIDTH] IN BLOOD BY AUTOMATED COUNT: 11.7 % (ref 10–15)
GLUCOSE SERPL-MCNC: 108 MG/DL (ref 70–99)
HCO3 SERPL-SCNC: 24 MMOL/L (ref 22–29)
HCT VFR BLD AUTO: 42.6 % (ref 40–53)
HGB BLD-MCNC: 14.8 G/DL (ref 13.3–17.7)
IMM GRANULOCYTES # BLD: 0 10E3/UL
IMM GRANULOCYTES NFR BLD: 0 %
LYMPHOCYTES # BLD AUTO: 1.8 10E3/UL (ref 0.8–5.3)
LYMPHOCYTES NFR BLD AUTO: 24 %
MCH RBC QN AUTO: 30.5 PG (ref 26.5–33)
MCHC RBC AUTO-ENTMCNC: 34.7 G/DL (ref 31.5–36.5)
MCV RBC AUTO: 88 FL (ref 78–100)
MONOCYTES # BLD AUTO: 0.8 10E3/UL (ref 0–1.3)
MONOCYTES NFR BLD AUTO: 11 %
NEUTROPHILS # BLD AUTO: 4.7 10E3/UL (ref 1.6–8.3)
NEUTROPHILS NFR BLD AUTO: 62 %
PLATELET # BLD AUTO: 227 10E3/UL (ref 150–450)
POTASSIUM SERPL-SCNC: 4.4 MMOL/L (ref 3.4–5.3)
RBC # BLD AUTO: 4.86 10E6/UL (ref 4.4–5.9)
SODIUM SERPL-SCNC: 134 MMOL/L (ref 135–145)
WBC # BLD AUTO: 7.6 10E3/UL (ref 4–11)

## 2024-10-15 PROCEDURE — G0463 HOSPITAL OUTPT CLINIC VISIT: HCPCS

## 2024-10-15 PROCEDURE — 99214 OFFICE O/P EST MOD 30 MIN: CPT | Performed by: INTERNAL MEDICINE

## 2024-10-15 PROCEDURE — 85025 COMPLETE CBC W/AUTO DIFF WBC: CPT | Mod: ZL | Performed by: INTERNAL MEDICINE

## 2024-10-15 PROCEDURE — 36415 COLL VENOUS BLD VENIPUNCTURE: CPT | Mod: ZL | Performed by: INTERNAL MEDICINE

## 2024-10-15 PROCEDURE — 80048 BASIC METABOLIC PNL TOTAL CA: CPT | Mod: ZL | Performed by: INTERNAL MEDICINE

## 2024-10-15 ASSESSMENT — PAIN SCALES - GENERAL: PAINLEVEL: NO PAIN (0)

## 2024-10-15 NOTE — NURSING NOTE
Per Dr. Leal:  Can you remind him via phone to hold all Asa, NSAIDs and vitamins including Fish oil     Telephone call placed to patient to give reminder.  No answer, message left to return call to clinic for update.

## 2024-10-15 NOTE — H&P (VIEW-ONLY)
Preoperative Evaluation  [facility]  [address]  [address]  [place] [address]  Phone: [phone]  Primary Provider: Jason Leal DO  Pre-op Performing Provider: Jason Leal DO  Oct 15, 2024             10/15/2024   Surgical Information   What procedure is being done? knee replacement right   Facility or Hospital where procedure/surgery will be performed: [facility]   Who is doing the procedure / surgery? [facility]   Date of surgery / procedure: [id number]   Time of surgery / procedure: 600   Where do you plan to recover after surgery? at home with family        Fax number for surgical facility: Note does not need to be faxed, will be available electronically in Epic.    Assessment & Plan     The proposed surgical procedure is considered INTERMEDIATE risk.    Problem List Items Addressed This Visit          Nervous and Auditory    Parkinson disease (H)       Endocrine    Hyperlipidemia LDL goal <100       Circulatory    Benign essential hypertension     Other Visit Diagnoses       Pre-op exam    -  Primary    Relevant Orders    CBC with platelets and differential    Basic metabolic panel                    - No identified additional risk factors other than previously addressed    Antiplatelet or Anticoagulation Medication Instructions  Instructed to hold aspirin, ibuprofen and supplements 1 week prior to the procedure.    Additional Medication Instructions  Patient is to only take his amlodipine and his metoprolol morning of procedure with small sip of water.  All other medications can be resumed after the procedure.    Recommendation  Approval given to proceed with proposed procedure, without further diagnostic evaluation.    Mayda Pang is a 64 year old, presenting for the following:  Pre-Op Exam        HPI related to upcoming procedure: Right total knee replacement        10/15/2024   Pre-Op Questionnaire   Have you ever had a heart attack or stroke? No    Have you ever had surgery on your heart or blood vessels, such as a stent placement, a coronary artery bypass, or surgery on an artery in your head, neck, heart, or legs? No   Do you have chest pain with activity? No   Do you have a history of heart failure? No   Do you currently have a cold, bronchitis or symptoms of other infection? No   Do you have a cough, shortness of breath, or wheezing? No   Do you or anyone in your family have previous history of blood clots? No   Do you or does anyone in your family have a serious bleeding problem such as prolonged bleeding following surgeries or cuts? No   Have you ever had problems with anemia or been told to take iron pills? No   Have you had any abnormal blood loss such as black, tarry or bloody stools? No   Have you ever had a blood transfusion? No   Are you willing to have a blood transfusion if it is medically needed before, during, or after your surgery? Yes   Have you or any of your relatives ever had problems with anesthesia? No   Do you have sleep apnea, excessive snoring or daytime drowsiness? No   Do you have any artifical heart valves or other implanted medical devices like a pacemaker, defibrillator, or continuous glucose monitor? No   Do you have artificial joints? No   Are you allergic to latex? No        [id number]    Status of Chronic Conditions:  HYPERLIPIDEMIA - Patient has a long history of significant Hyperlipidemia requiring medication for treatment with recent good control. Patient reports no problems or side effects with the medication.     HYPERTENSION - Patient has longstanding history of HTN , currently denies any symptoms referable to elevated blood pressure. Specifically denies chest pain, palpitations, dyspnea, orthopnea, PND or peripheral edema. Blood pressure readings have been in normal range. Current medication regimen is as listed below. Patient denies any side effects of medication.     Parkinson's disease    Patient Active Problem List     Diagnosis Date Noted    Anxiety 03/29/2022     Priority: Medium    Prediabetes 03/18/2022     Priority: Medium    Tremor 03/18/2022     Priority: Medium    Hyperlipidemia LDL goal <100 12/07/2020     Priority: Medium    Encounter for monitoring statin therapy 12/07/2020     Priority: Medium    Benign essential hypertension 06/03/2020     Priority: Medium    Benign non-nodular prostatic hyperplasia with lower urinary tract symptoms 08/29/2016     Priority: Medium      Past Medical History:   Diagnosis Date    Encounter for monitoring statin therapy 12/07/2020    HTN (hypertension)     Hyperlipidemia LDL goal <100 12/07/2020     Past Surgical History:   Procedure Laterality Date    APPENDECTOMY      GENITOURINARY SURGERY      circ    SOFT TISSUE SURGERY      wisdom teeth extracted     Current Outpatient Medications   Medication Sig Dispense Refill    amLODIPine (NORVASC) 10 MG tablet Take 1 tablet (10 mg) by mouth daily. 90 tablet 2    carbidopa-levodopa (SINEMET)  MG tablet Take 1.5 tablets by mouth 4 times daily.      losartan (COZAAR) 100 MG tablet TAKE 1 TABLET(100 MG) BY MOUTH DAILY 90 tablet 3    metoprolol succinate ER (TOPROL XL) 100 MG 24 hr tablet TAKE 1 TABLET(100 MG) BY MOUTH TWICE DAILY 180 tablet 3    Omega-3 Fatty Acids (OMEGA 3 PO) Take 1 tablet by mouth daily.      omeprazole (PRILOSEC) 40 MG DR capsule Take 1 capsule (40 mg) by mouth daily 90 capsule 1    sertraline (ZOLOFT) 25 MG tablet TAKE 1 TABLET(25 MG) BY MOUTH DAILY 90 tablet 3    tamsulosin (FLOMAX) 0.4 MG capsule Take 0.4 mg by mouth daily      traZODone (DESYREL) 50 MG tablet TAKE 1/2 TO 1 TABLET(25 TO 50 MG) BY MOUTH AT BEDTIME 90 tablet 1    zinc 50 MG TABS          Allergies   Allergen Reactions    Atorvastatin      Insomnia         Social History     Tobacco Use    Smoking status: Never    Smokeless tobacco: Never   Substance Use Topics    Alcohol use: Yes     Comment: 6 beers a week     Family History   Problem Relation Age of  "Onset    Other Cancer Father         brain tumor- at 52    Diabetes No family hx of     Thyroid Disease No family hx of     Asthma No family hx of      History   Drug Use No             Review of Systems  Constitutional, neuro, ENT, endocrine, pulmonary, cardiac, gastrointestinal, genitourinary, musculoskeletal, integument and psychiatric systems are negative, except as otherwise noted.    Objective    /87 (BP Location: Left arm, Patient Position: Sitting, Cuff Size: Adult Large)   Pulse 84   Temp 98.5  F (36.9  C) (Tympanic)   Resp 20   Wt 96.6 kg (213 lb)   SpO2 96%   BMI 28.89 kg/m     Estimated body mass index is 28.89 kg/m  as calculated from the following:    Height as of 3/29/22: 1.829 m (6').    Weight as of this encounter: 96.6 kg (213 lb).  Physical Exam  GENERAL: alert and no distress  EYES: Eyes grossly normal to inspection, PERRL and conjunctivae and sclerae normal  RESP: lungs clear to auscultation - no rales, rhonchi or wheezes  CV: regular rate and rhythm, normal S1 S2, no S3 or S4, no murmur, click or rub, no peripheral edema  MS: no gross musculoskeletal defects noted, no edema  NEURO: Tremors in all extremities  PSYCH: mentation appears normal, affect normal/bright    No results for input(s): \"HGB\", \"PLT\", \"INR\", \"NA\", \"POTASSIUM\", \"CR\", \"A1C\" in the last 8760 hours.     Diagnostics  Recent Results (from the past 24 hour(s))   Basic metabolic panel    Collection Time: 10/15/24  2:35 PM   Result Value Ref Range    Sodium 134 (L) 135 - 145 mmol/L    Potassium 4.4 3.4 - 5.3 mmol/L    Chloride 98 98 - 107 mmol/L    Carbon Dioxide (CO2) 24 22 - 29 mmol/L    Anion Gap 12 7 - 15 mmol/L    Urea Nitrogen 18.0 8.0 - 23.0 mg/dL    Creatinine 1.31 (H) 0.67 - 1.17 mg/dL    GFR Estimate 61 >60 mL/min/1.73m2    Calcium 9.6 8.8 - 10.4 mg/dL    Glucose 108 (H) 70 - 99 mg/dL   CBC with platelets and differential    Collection Time: 10/15/24  2:35 PM   Result Value Ref Range    WBC Count 7.6 4.0 - " 11.0 10e3/uL    RBC Count 4.86 4.40 - 5.90 10e6/uL    Hemoglobin 14.8 13.3 - 17.7 g/dL    Hematocrit 42.6 40.0 - 53.0 %    MCV 88 78 - 100 fL    MCH 30.5 26.5 - 33.0 pg    MCHC 34.7 31.5 - 36.5 g/dL    RDW 11.7 10.0 - 15.0 %    Platelet Count 227 150 - 450 10e3/uL    % Neutrophils 62 %    % Lymphocytes 24 %    % Monocytes 11 %    % Eosinophils 3 %    % Basophils 1 %    % Immature Granulocytes 0 %    Absolute Neutrophils 4.7 1.6 - 8.3 10e3/uL    Absolute Lymphocytes 1.8 0.8 - 5.3 10e3/uL    Absolute Monocytes 0.8 0.0 - 1.3 10e3/uL    Absolute Eosinophils 0.2 0.0 - 0.7 10e3/uL    Absolute Basophils 0.1 0.0 - 0.2 10e3/uL    Absolute Immature Granulocytes 0.0 <=0.4 10e3/uL      EKG: Normal Sinus Rhythm rate 73 on date September 5, 2024.    Revised Cardiac Risk Index (RCRI)  The patient has the following serious cardiovascular risks for perioperative complications:   - No serious cardiac risks = 0 points     RCRI Interpretation: 0 points: Class I (very low risk - 0.4% complication rate)         Signed Electronically by: Jason Leal DO  A copy of this evaluation report is provided to the requesting physician.

## 2024-10-15 NOTE — PATIENT INSTRUCTIONS
Morning of procedure ONLY take Metoprolol and Amlodipine with small sip of water.   Resume all other medications later that day.

## 2024-10-16 DIAGNOSIS — R79.89 ELEVATED SERUM CREATININE: Primary | ICD-10-CM

## 2024-10-18 RX ORDER — ACETAMINOPHEN 500 MG
500-1000 TABLET ORAL EVERY 6 HOURS PRN
COMMUNITY

## 2024-10-18 NOTE — OR NURSING
Noted at preop patient's creatnine was elevated.   Patient states he is going on MOnday 10/21 for a lab recheck.

## 2024-10-21 ENCOUNTER — LAB (OUTPATIENT)
Dept: LAB | Facility: OTHER | Age: 64
End: 2024-10-21
Payer: COMMERCIAL

## 2024-10-21 DIAGNOSIS — R79.89 ELEVATED SERUM CREATININE: ICD-10-CM

## 2024-10-21 LAB
ANION GAP SERPL CALCULATED.3IONS-SCNC: 12 MMOL/L (ref 7–15)
BUN SERPL-MCNC: 15 MG/DL (ref 8–23)
CALCIUM SERPL-MCNC: 9.9 MG/DL (ref 8.8–10.4)
CHLORIDE SERPL-SCNC: 99 MMOL/L (ref 98–107)
CREAT SERPL-MCNC: 0.94 MG/DL (ref 0.67–1.17)
EGFRCR SERPLBLD CKD-EPI 2021: >90 ML/MIN/1.73M2
GLUCOSE SERPL-MCNC: 102 MG/DL (ref 70–99)
HCO3 SERPL-SCNC: 23 MMOL/L (ref 22–29)
HOLD SPECIMEN: NORMAL
POTASSIUM SERPL-SCNC: 4.2 MMOL/L (ref 3.4–5.3)
SODIUM SERPL-SCNC: 134 MMOL/L (ref 135–145)

## 2024-10-21 PROCEDURE — 80048 BASIC METABOLIC PNL TOTAL CA: CPT | Mod: ZL

## 2024-10-21 PROCEDURE — 36415 COLL VENOUS BLD VENIPUNCTURE: CPT | Mod: ZL

## 2024-10-25 ENCOUNTER — HOSPITAL ENCOUNTER (OUTPATIENT)
Facility: HOSPITAL | Age: 64
Discharge: HOME | End: 2024-10-25
Attending: ORTHOPAEDIC SURGERY | Admitting: ORTHOPAEDIC SURGERY
Payer: COMMERCIAL

## 2024-10-25 ENCOUNTER — APPOINTMENT (OUTPATIENT)
Dept: GENERAL RADIOLOGY | Facility: HOSPITAL | Age: 64
End: 2024-10-25
Attending: ORTHOPAEDIC SURGERY
Payer: COMMERCIAL

## 2024-10-25 VITALS
HEIGHT: 72 IN | BODY MASS INDEX: 28.15 KG/M2 | WEIGHT: 207.8 LBS | TEMPERATURE: 98.8 F | RESPIRATION RATE: 16 BRPM | OXYGEN SATURATION: 94 % | HEART RATE: 79 BPM | SYSTOLIC BLOOD PRESSURE: 131 MMHG | DIASTOLIC BLOOD PRESSURE: 92 MMHG

## 2024-10-25 DIAGNOSIS — M25.561 CHRONIC PAIN OF RIGHT KNEE: Primary | ICD-10-CM

## 2024-10-25 DIAGNOSIS — G89.29 CHRONIC PAIN OF RIGHT KNEE: Primary | ICD-10-CM

## 2024-10-25 PROCEDURE — 97116 GAIT TRAINING THERAPY: CPT | Mod: GP

## 2024-10-25 PROCEDURE — 999N000065 XR KNEE PORT RIGHT 1/2 VIEWS: Mod: RT

## 2024-10-25 PROCEDURE — 250N000013 HC RX MED GY IP 250 OP 250 PS 637: Performed by: ORTHOPAEDIC SURGERY

## 2024-10-25 PROCEDURE — 370N000017 HC ANESTHESIA TECHNICAL FEE, PER MIN: Performed by: ORTHOPAEDIC SURGERY

## 2024-10-25 PROCEDURE — 250N000011 HC RX IP 250 OP 636: Performed by: NURSE ANESTHETIST, CERTIFIED REGISTERED

## 2024-10-25 PROCEDURE — 360N000077 HC SURGERY LEVEL 4, PER MIN: Performed by: ORTHOPAEDIC SURGERY

## 2024-10-25 PROCEDURE — 97161 PT EVAL LOW COMPLEX 20 MIN: CPT | Mod: GP

## 2024-10-25 PROCEDURE — 97110 THERAPEUTIC EXERCISES: CPT | Mod: GP

## 2024-10-25 PROCEDURE — 250N000009 HC RX 250: Performed by: ORTHOPAEDIC SURGERY

## 2024-10-25 PROCEDURE — 710N000010 HC RECOVERY PHASE 1, LEVEL 2, PER MIN: Performed by: ORTHOPAEDIC SURGERY

## 2024-10-25 PROCEDURE — C1713 ANCHOR/SCREW BN/BN,TIS/BN: HCPCS | Performed by: ORTHOPAEDIC SURGERY

## 2024-10-25 PROCEDURE — 258N000003 HC RX IP 258 OP 636: Performed by: NURSE ANESTHETIST, CERTIFIED REGISTERED

## 2024-10-25 PROCEDURE — 710N000012 HC RECOVERY PHASE 2, PER MINUTE: Performed by: ORTHOPAEDIC SURGERY

## 2024-10-25 PROCEDURE — C1776 JOINT DEVICE (IMPLANTABLE): HCPCS | Performed by: ORTHOPAEDIC SURGERY

## 2024-10-25 PROCEDURE — 250N000011 HC RX IP 250 OP 636: Performed by: ORTHOPAEDIC SURGERY

## 2024-10-25 PROCEDURE — 27447 TOTAL KNEE ARTHROPLASTY: CPT | Performed by: NURSE ANESTHETIST, CERTIFIED REGISTERED

## 2024-10-25 PROCEDURE — 999N000141 HC STATISTIC PRE-PROCEDURE NURSING ASSESSMENT: Performed by: ORTHOPAEDIC SURGERY

## 2024-10-25 PROCEDURE — 250N000013 HC RX MED GY IP 250 OP 250 PS 637: Performed by: NURSE ANESTHETIST, CERTIFIED REGISTERED

## 2024-10-25 PROCEDURE — 250N000009 HC RX 250: Performed by: NURSE ANESTHETIST, CERTIFIED REGISTERED

## 2024-10-25 PROCEDURE — 272N000001 HC OR GENERAL SUPPLY STERILE: Performed by: ORTHOPAEDIC SURGERY

## 2024-10-25 PROCEDURE — 27447 TOTAL KNEE ARTHROPLASTY: CPT | Mod: RT | Performed by: ORTHOPAEDIC SURGERY

## 2024-10-25 DEVICE — SCREW TEMP PSN ZIM FEMALE 2.5MM 42-5099-025-25: Type: IMPLANTABLE DEVICE | Site: KNEE | Status: FUNCTIONAL

## 2024-10-25 DEVICE — IMPLANTABLE DEVICE: Type: IMPLANTABLE DEVICE | Site: KNEE | Status: FUNCTIONAL

## 2024-10-25 DEVICE — IMP COMP PATELLA ZIM ALL POLY 32MM STD 42-5402-000-32: Type: IMPLANTABLE DEVICE | Site: KNEE | Status: FUNCTIONAL

## 2024-10-25 DEVICE — BONE CEMENT SIMPLEX FULL DOSE 6191-1-001: Type: IMPLANTABLE DEVICE | Site: KNEE | Status: FUNCTIONAL

## 2024-10-25 RX ORDER — ONDANSETRON 2 MG/ML
4 INJECTION INTRAMUSCULAR; INTRAVENOUS EVERY 6 HOURS PRN
Status: DISCONTINUED | OUTPATIENT
Start: 2024-10-25 | End: 2024-10-25 | Stop reason: HOSPADM

## 2024-10-25 RX ORDER — HYDROXYZINE HYDROCHLORIDE 50 MG/ML
INJECTION, SOLUTION INTRAMUSCULAR
Status: COMPLETED
Start: 2024-10-25 | End: 2024-10-25

## 2024-10-25 RX ORDER — PROCHLORPERAZINE MALEATE 10 MG
10 TABLET ORAL EVERY 6 HOURS PRN
Status: DISCONTINUED | OUTPATIENT
Start: 2024-10-25 | End: 2024-10-25 | Stop reason: HOSPADM

## 2024-10-25 RX ORDER — METHOCARBAMOL 750 MG/1
750 TABLET, FILM COATED ORAL EVERY 6 HOURS PRN
Status: DISCONTINUED | OUTPATIENT
Start: 2024-10-25 | End: 2024-10-25 | Stop reason: HOSPADM

## 2024-10-25 RX ORDER — CHLOROPROCAINE HYDROCHLORIDE 20 MG/ML
INJECTION, SOLUTION EPIDURAL; INFILTRATION; INTRACAUDAL; PERINEURAL
Status: DISCONTINUED
Start: 2024-10-25 | End: 2024-10-25 | Stop reason: WASHOUT

## 2024-10-25 RX ORDER — BUPIVACAINE HYDROCHLORIDE 2.5 MG/ML
INJECTION, SOLUTION INFILTRATION; PERINEURAL PRN
Status: DISCONTINUED | OUTPATIENT
Start: 2024-10-25 | End: 2024-10-25 | Stop reason: HOSPADM

## 2024-10-25 RX ORDER — FENTANYL CITRATE 50 UG/ML
50 INJECTION, SOLUTION INTRAMUSCULAR; INTRAVENOUS EVERY 5 MIN PRN
Status: DISCONTINUED | OUTPATIENT
Start: 2024-10-25 | End: 2024-10-25 | Stop reason: HOSPADM

## 2024-10-25 RX ORDER — KETOROLAC TROMETHAMINE 30 MG/ML
INJECTION, SOLUTION INTRAMUSCULAR; INTRAVENOUS PRN
Status: DISCONTINUED | OUTPATIENT
Start: 2024-10-25 | End: 2024-10-25

## 2024-10-25 RX ORDER — HYDROMORPHONE HYDROCHLORIDE 1 MG/ML
0.4 INJECTION, SOLUTION INTRAMUSCULAR; INTRAVENOUS; SUBCUTANEOUS EVERY 5 MIN PRN
Status: DISCONTINUED | OUTPATIENT
Start: 2024-10-25 | End: 2024-10-25 | Stop reason: HOSPADM

## 2024-10-25 RX ORDER — SODIUM CHLORIDE, SODIUM LACTATE, POTASSIUM CHLORIDE, CALCIUM CHLORIDE 600; 310; 30; 20 MG/100ML; MG/100ML; MG/100ML; MG/100ML
INJECTION, SOLUTION INTRAVENOUS CONTINUOUS PRN
Status: DISCONTINUED | OUTPATIENT
Start: 2024-10-25 | End: 2024-10-25

## 2024-10-25 RX ORDER — HYDROXYZINE HYDROCHLORIDE 25 MG/1
25 TABLET, FILM COATED ORAL EVERY 6 HOURS PRN
Status: DISCONTINUED | OUTPATIENT
Start: 2024-10-25 | End: 2024-10-25 | Stop reason: HOSPADM

## 2024-10-25 RX ORDER — HYDROMORPHONE HYDROCHLORIDE 1 MG/ML
0.2 INJECTION, SOLUTION INTRAMUSCULAR; INTRAVENOUS; SUBCUTANEOUS EVERY 5 MIN PRN
Status: DISCONTINUED | OUTPATIENT
Start: 2024-10-25 | End: 2024-10-25 | Stop reason: HOSPADM

## 2024-10-25 RX ORDER — BUPIVACAINE HYDROCHLORIDE 2.5 MG/ML
INJECTION, SOLUTION EPIDURAL; INFILTRATION; INTRACAUDAL
Status: DISCONTINUED
Start: 2024-10-25 | End: 2024-10-25 | Stop reason: HOSPADM

## 2024-10-25 RX ORDER — HYDRALAZINE HYDROCHLORIDE 20 MG/ML
2.5-5 INJECTION INTRAMUSCULAR; INTRAVENOUS EVERY 10 MIN PRN
Status: DISCONTINUED | OUTPATIENT
Start: 2024-10-25 | End: 2024-10-25 | Stop reason: HOSPADM

## 2024-10-25 RX ORDER — BUPIVACAINE HYDROCHLORIDE 7.5 MG/ML
INJECTION, SOLUTION INTRASPINAL PRN
Status: DISCONTINUED | OUTPATIENT
Start: 2024-10-25 | End: 2024-10-25

## 2024-10-25 RX ORDER — PROPOFOL 10 MG/ML
INJECTION, EMULSION INTRAVENOUS CONTINUOUS PRN
Status: DISCONTINUED | OUTPATIENT
Start: 2024-10-25 | End: 2024-10-25

## 2024-10-25 RX ORDER — ASPIRIN 81 MG/1
81 TABLET ORAL 2 TIMES DAILY
Qty: 60 TABLET | Refills: 0 | Status: SHIPPED | OUTPATIENT
Start: 2024-10-25

## 2024-10-25 RX ORDER — BISACODYL 10 MG
10 SUPPOSITORY, RECTAL RECTAL DAILY PRN
Status: DISCONTINUED | OUTPATIENT
Start: 2024-10-25 | End: 2024-10-25 | Stop reason: HOSPADM

## 2024-10-25 RX ORDER — HYDROXYZINE HYDROCHLORIDE 50 MG/ML
50 INJECTION, SOLUTION INTRAMUSCULAR ONCE
Status: COMPLETED | OUTPATIENT
Start: 2024-10-25 | End: 2024-10-25

## 2024-10-25 RX ORDER — ACETAMINOPHEN 325 MG/1
650 TABLET ORAL EVERY 4 HOURS PRN
Status: DISCONTINUED | OUTPATIENT
Start: 2024-10-28 | End: 2024-10-25 | Stop reason: HOSPADM

## 2024-10-25 RX ORDER — CEFAZOLIN SODIUM 2 G/100ML
2 INJECTION, SOLUTION INTRAVENOUS EVERY 8 HOURS
Status: DISCONTINUED | OUTPATIENT
Start: 2024-10-25 | End: 2024-10-25 | Stop reason: HOSPADM

## 2024-10-25 RX ORDER — ONDANSETRON 2 MG/ML
4 INJECTION INTRAMUSCULAR; INTRAVENOUS EVERY 30 MIN PRN
Status: DISCONTINUED | OUTPATIENT
Start: 2024-10-25 | End: 2024-10-25 | Stop reason: HOSPADM

## 2024-10-25 RX ORDER — NALOXONE HYDROCHLORIDE 0.4 MG/ML
0.1 INJECTION, SOLUTION INTRAMUSCULAR; INTRAVENOUS; SUBCUTANEOUS
Status: DISCONTINUED | OUTPATIENT
Start: 2024-10-25 | End: 2024-10-25 | Stop reason: HOSPADM

## 2024-10-25 RX ORDER — POLYETHYLENE GLYCOL 3350 17 G/17G
17 POWDER, FOR SOLUTION ORAL DAILY
Status: DISCONTINUED | OUTPATIENT
Start: 2024-10-26 | End: 2024-10-25 | Stop reason: HOSPADM

## 2024-10-25 RX ORDER — ONDANSETRON 4 MG/1
4 TABLET, ORALLY DISINTEGRATING ORAL EVERY 30 MIN PRN
Status: DISCONTINUED | OUTPATIENT
Start: 2024-10-25 | End: 2024-10-25 | Stop reason: HOSPADM

## 2024-10-25 RX ORDER — ASPIRIN 81 MG/1
81 TABLET ORAL 2 TIMES DAILY
Status: DISCONTINUED | OUTPATIENT
Start: 2024-10-26 | End: 2024-10-25 | Stop reason: HOSPADM

## 2024-10-25 RX ORDER — ONDANSETRON 4 MG/1
4 TABLET, FILM COATED ORAL EVERY 8 HOURS PRN
Qty: 10 TABLET | Refills: 0 | Status: SHIPPED | OUTPATIENT
Start: 2024-10-25

## 2024-10-25 RX ORDER — ACETAMINOPHEN 325 MG/1
975 TABLET ORAL ONCE
Status: COMPLETED | OUTPATIENT
Start: 2024-10-25 | End: 2024-10-25

## 2024-10-25 RX ORDER — FENTANYL CITRATE 0.05 MG/ML
INJECTION, SOLUTION INTRAMUSCULAR; INTRAVENOUS PRN
Status: DISCONTINUED | OUTPATIENT
Start: 2024-10-25 | End: 2024-10-25

## 2024-10-25 RX ORDER — CEFAZOLIN SODIUM/WATER 2 G/20 ML
2 SYRINGE (ML) INTRAVENOUS
Status: COMPLETED | OUTPATIENT
Start: 2024-10-25 | End: 2024-10-25

## 2024-10-25 RX ORDER — TRANEXAMIC ACID 10 MG/ML
1 INJECTION, SOLUTION INTRAVENOUS ONCE
Status: COMPLETED | OUTPATIENT
Start: 2024-10-25 | End: 2024-10-25

## 2024-10-25 RX ORDER — BUPIVACAINE HYDROCHLORIDE 2.5 MG/ML
INJECTION, SOLUTION EPIDURAL; INFILTRATION; INTRACAUDAL
Status: COMPLETED | OUTPATIENT
Start: 2024-10-25 | End: 2024-10-25

## 2024-10-25 RX ORDER — OXYCODONE HYDROCHLORIDE 5 MG/1
5 TABLET ORAL EVERY 4 HOURS PRN
Status: DISCONTINUED | OUTPATIENT
Start: 2024-10-25 | End: 2024-10-25 | Stop reason: HOSPADM

## 2024-10-25 RX ORDER — HYDROXYZINE HYDROCHLORIDE 25 MG/1
25 TABLET, FILM COATED ORAL EVERY 6 HOURS PRN
Qty: 30 TABLET | Refills: 0 | Status: SHIPPED | OUTPATIENT
Start: 2024-10-25

## 2024-10-25 RX ORDER — ONDANSETRON 4 MG/1
4 TABLET, ORALLY DISINTEGRATING ORAL EVERY 6 HOURS PRN
Status: DISCONTINUED | OUTPATIENT
Start: 2024-10-25 | End: 2024-10-25 | Stop reason: HOSPADM

## 2024-10-25 RX ORDER — OXYCODONE HYDROCHLORIDE 5 MG/1
5 TABLET ORAL EVERY 4 HOURS PRN
Qty: 30 TABLET | Refills: 0 | Status: SHIPPED | OUTPATIENT
Start: 2024-10-25

## 2024-10-25 RX ORDER — FENTANYL CITRATE 50 UG/ML
25 INJECTION, SOLUTION INTRAMUSCULAR; INTRAVENOUS EVERY 5 MIN PRN
Status: DISCONTINUED | OUTPATIENT
Start: 2024-10-25 | End: 2024-10-25 | Stop reason: HOSPADM

## 2024-10-25 RX ORDER — LIDOCAINE 40 MG/G
CREAM TOPICAL
Status: DISCONTINUED | OUTPATIENT
Start: 2024-10-25 | End: 2024-10-25 | Stop reason: HOSPADM

## 2024-10-25 RX ORDER — KETOROLAC TROMETHAMINE 15 MG/ML
15 INJECTION, SOLUTION INTRAMUSCULAR; INTRAVENOUS EVERY 6 HOURS
Status: DISCONTINUED | OUTPATIENT
Start: 2024-10-25 | End: 2024-10-25 | Stop reason: HOSPADM

## 2024-10-25 RX ORDER — CARBIDOPA AND LEVODOPA 25; 100 MG/1; MG/1
1 TABLET ORAL ONCE
Status: COMPLETED | OUTPATIENT
Start: 2024-10-25 | End: 2024-10-25

## 2024-10-25 RX ORDER — TRANEXAMIC ACID 10 MG/ML
1 INJECTION, SOLUTION INTRAVENOUS ONCE
Status: DISCONTINUED | OUTPATIENT
Start: 2024-10-25 | End: 2024-10-25 | Stop reason: HOSPADM

## 2024-10-25 RX ORDER — ACETAMINOPHEN 325 MG/1
975 TABLET ORAL EVERY 8 HOURS
Status: DISCONTINUED | OUTPATIENT
Start: 2024-10-25 | End: 2024-10-25 | Stop reason: HOSPADM

## 2024-10-25 RX ORDER — AMOXICILLIN 250 MG
1-2 CAPSULE ORAL 2 TIMES DAILY
Qty: 30 TABLET | Refills: 0 | Status: SHIPPED | OUTPATIENT
Start: 2024-10-25

## 2024-10-25 RX ORDER — FENTANYL CITRATE 50 UG/ML
INJECTION, SOLUTION INTRAMUSCULAR; INTRAVENOUS
Status: COMPLETED
Start: 2024-10-25 | End: 2024-10-25

## 2024-10-25 RX ORDER — DEXAMETHASONE SODIUM PHOSPHATE 10 MG/ML
4 INJECTION, SOLUTION INTRAMUSCULAR; INTRAVENOUS
Status: DISCONTINUED | OUTPATIENT
Start: 2024-10-25 | End: 2024-10-25 | Stop reason: HOSPADM

## 2024-10-25 RX ORDER — OXYCODONE HYDROCHLORIDE 5 MG/1
TABLET ORAL
Status: COMPLETED
Start: 2024-10-25 | End: 2024-10-25

## 2024-10-25 RX ORDER — AMOXICILLIN 250 MG
1 CAPSULE ORAL 2 TIMES DAILY
Status: DISCONTINUED | OUTPATIENT
Start: 2024-10-25 | End: 2024-10-25 | Stop reason: HOSPADM

## 2024-10-25 RX ORDER — CEFAZOLIN SODIUM/WATER 2 G/20 ML
2 SYRINGE (ML) INTRAVENOUS SEE ADMIN INSTRUCTIONS
Status: DISCONTINUED | OUTPATIENT
Start: 2024-10-25 | End: 2024-10-25 | Stop reason: HOSPADM

## 2024-10-25 RX ORDER — LABETALOL HYDROCHLORIDE 5 MG/ML
10 INJECTION, SOLUTION INTRAVENOUS
Status: COMPLETED | OUTPATIENT
Start: 2024-10-25 | End: 2024-10-25

## 2024-10-25 RX ORDER — SODIUM CHLORIDE, SODIUM LACTATE, POTASSIUM CHLORIDE, CALCIUM CHLORIDE 600; 310; 30; 20 MG/100ML; MG/100ML; MG/100ML; MG/100ML
INJECTION, SOLUTION INTRAVENOUS CONTINUOUS
Status: DISCONTINUED | OUTPATIENT
Start: 2024-10-25 | End: 2024-10-25 | Stop reason: HOSPADM

## 2024-10-25 RX ORDER — DEXAMETHASONE SODIUM PHOSPHATE 4 MG/ML
4 INJECTION, SOLUTION INTRA-ARTICULAR; INTRALESIONAL; INTRAMUSCULAR; INTRAVENOUS; SOFT TISSUE
Status: DISCONTINUED | OUTPATIENT
Start: 2024-10-25 | End: 2024-10-25 | Stop reason: HOSPADM

## 2024-10-25 RX ORDER — CEFAZOLIN SODIUM/WATER 2 G/20 ML
2 SYRINGE (ML) INTRAVENOUS EVERY 8 HOURS
Status: DISCONTINUED | OUTPATIENT
Start: 2024-10-25 | End: 2024-10-25

## 2024-10-25 RX ORDER — OXYCODONE HYDROCHLORIDE 5 MG/1
10 TABLET ORAL EVERY 4 HOURS PRN
Status: DISCONTINUED | OUTPATIENT
Start: 2024-10-25 | End: 2024-10-25 | Stop reason: HOSPADM

## 2024-10-25 RX ORDER — HYDROMORPHONE HYDROCHLORIDE 1 MG/ML
0.2 INJECTION, SOLUTION INTRAMUSCULAR; INTRAVENOUS; SUBCUTANEOUS
Status: DISCONTINUED | OUTPATIENT
Start: 2024-10-25 | End: 2024-10-25 | Stop reason: HOSPADM

## 2024-10-25 RX ORDER — HYDROMORPHONE HYDROCHLORIDE 1 MG/ML
0.4 INJECTION, SOLUTION INTRAMUSCULAR; INTRAVENOUS; SUBCUTANEOUS
Status: DISCONTINUED | OUTPATIENT
Start: 2024-10-25 | End: 2024-10-25 | Stop reason: HOSPADM

## 2024-10-25 RX ORDER — IBUPROFEN 600 MG/1
600 TABLET, FILM COATED ORAL EVERY 6 HOURS PRN
Qty: 60 TABLET | Refills: 0 | Status: SHIPPED | OUTPATIENT
Start: 2024-10-25

## 2024-10-25 RX ORDER — ALBUTEROL SULFATE 0.83 MG/ML
2.5 SOLUTION RESPIRATORY (INHALATION) EVERY 4 HOURS PRN
Status: DISCONTINUED | OUTPATIENT
Start: 2024-10-25 | End: 2024-10-25 | Stop reason: HOSPADM

## 2024-10-25 RX ORDER — PROPOFOL 10 MG/ML
INJECTION, EMULSION INTRAVENOUS PRN
Status: DISCONTINUED | OUTPATIENT
Start: 2024-10-25 | End: 2024-10-25

## 2024-10-25 RX ADMIN — FENTANYL CITRATE 25 MCG: 0.05 INJECTION, SOLUTION INTRAMUSCULAR; INTRAVENOUS at 08:15

## 2024-10-25 RX ADMIN — CARBIDOPA AND LEVODOPA 1 TABLET: 25; 100 TABLET ORAL at 09:41

## 2024-10-25 RX ADMIN — PROPOFOL 65 MCG/KG/MIN: 10 INJECTION, EMULSION INTRAVENOUS at 07:53

## 2024-10-25 RX ADMIN — OXYCODONE HYDROCHLORIDE 5 MG: 5 TABLET ORAL at 11:04

## 2024-10-25 RX ADMIN — FENTANYL CITRATE 25 MCG: 0.05 INJECTION, SOLUTION INTRAMUSCULAR; INTRAVENOUS at 08:29

## 2024-10-25 RX ADMIN — HYDROMORPHONE HYDROCHLORIDE 0.5 MG: 1 INJECTION, SOLUTION INTRAMUSCULAR; INTRAVENOUS; SUBCUTANEOUS at 09:07

## 2024-10-25 RX ADMIN — TRANEXAMIC ACID 1 G: 10 INJECTION, SOLUTION INTRAVENOUS at 07:44

## 2024-10-25 RX ADMIN — PROPOFOL 35 MCG/KG/MIN: 10 INJECTION, EMULSION INTRAVENOUS at 08:00

## 2024-10-25 RX ADMIN — PROPOFOL 65 MCG/KG/MIN: 10 INJECTION, EMULSION INTRAVENOUS at 08:05

## 2024-10-25 RX ADMIN — BUPIVACAINE HYDROCHLORIDE 15 ML: 2.5 INJECTION, SOLUTION EPIDURAL; INFILTRATION; INTRACAUDAL at 07:20

## 2024-10-25 RX ADMIN — PROPOFOL 30 MG: 10 INJECTION, EMULSION INTRAVENOUS at 07:53

## 2024-10-25 RX ADMIN — TRANEXAMIC ACID 1 G: 1 INJECTION, SOLUTION INTRAVENOUS at 08:50

## 2024-10-25 RX ADMIN — CEFAZOLIN SODIUM 2 G: 2 INJECTION, SOLUTION INTRAVENOUS at 12:17

## 2024-10-25 RX ADMIN — MIDAZOLAM 2 MG: 1 INJECTION INTRAMUSCULAR; INTRAVENOUS at 07:15

## 2024-10-25 RX ADMIN — BUPIVACAINE HYDROCHLORIDE 20 ML: 2.5 INJECTION, SOLUTION EPIDURAL; INFILTRATION; INTRACAUDAL at 07:15

## 2024-10-25 RX ADMIN — FENTANYL CITRATE 50 MCG: 50 INJECTION INTRAMUSCULAR; INTRAVENOUS at 09:20

## 2024-10-25 RX ADMIN — HYDROXYZINE HYDROCHLORIDE 50 MG: 50 INJECTION, SOLUTION INTRAMUSCULAR at 10:05

## 2024-10-25 RX ADMIN — SODIUM CHLORIDE, POTASSIUM CHLORIDE, SODIUM LACTATE AND CALCIUM CHLORIDE: 600; 310; 30; 20 INJECTION, SOLUTION INTRAVENOUS at 07:00

## 2024-10-25 RX ADMIN — KETOROLAC TROMETHAMINE 15 MG: 30 INJECTION, SOLUTION INTRAMUSCULAR at 09:04

## 2024-10-25 RX ADMIN — FENTANYL CITRATE 25 MCG: 0.05 INJECTION, SOLUTION INTRAMUSCULAR; INTRAVENOUS at 07:15

## 2024-10-25 RX ADMIN — BUPIVACAINE HYDROCHLORIDE IN DEXTROSE 1.7 ML: 7.5 INJECTION, SOLUTION SUBARACHNOID at 07:51

## 2024-10-25 RX ADMIN — FENTANYL CITRATE 25 MCG: 0.05 INJECTION, SOLUTION INTRAMUSCULAR; INTRAVENOUS at 08:18

## 2024-10-25 RX ADMIN — Medication 2 G: at 07:33

## 2024-10-25 RX ADMIN — HYDROMORPHONE HYDROCHLORIDE 0.4 MG: 1 INJECTION, SOLUTION INTRAMUSCULAR; INTRAVENOUS; SUBCUTANEOUS at 09:46

## 2024-10-25 RX ADMIN — FENTANYL CITRATE 50 MCG: 50 INJECTION INTRAMUSCULAR; INTRAVENOUS at 09:30

## 2024-10-25 RX ADMIN — ACETAMINOPHEN 975 MG: 325 TABLET ORAL at 07:11

## 2024-10-25 RX ADMIN — LABETALOL HYDROCHLORIDE 10 MG: 5 INJECTION, SOLUTION INTRAVENOUS at 10:22

## 2024-10-25 RX ADMIN — BUPIVACAINE HYDROCHLORIDE 15 ML: 2.5 INJECTION, SOLUTION EPIDURAL; INFILTRATION; INTRACAUDAL at 07:25

## 2024-10-25 ASSESSMENT — ACTIVITIES OF DAILY LIVING (ADL)
ADLS_ACUITY_SCORE: 0

## 2024-10-25 ASSESSMENT — LIFESTYLE VARIABLES: TOBACCO_USE: 0

## 2024-10-25 NOTE — ANESTHESIA CARE TRANSFER NOTE
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    Anesthesia Type:   Spinal     Note:    Oropharynx: oropharynx clear of all foreign objects and spontaneously breathing  Level of Consciousness: awake  Oxygen Supplementation: nasal cannula  Level of Supplemental Oxygen (L/min / FiO2): 2  Independent Airway: airway patency satisfactory and stable  Dentition: dentition unchanged  Vital Signs Stable: post-procedure vital signs reviewed and stable  Report to RN Given: handoff report given  Patient transferred to: PACU    Handoff Report: Identifed the Patient, Identified the Reponsible Provider, Reviewed the pertinent medical history, Discussed the surgical course, Reviewed Intra-OP anesthesia mangement and issues during anesthesia, Set expectations for post-procedure period and Allowed opportunity for questions and acknowledgement of understanding  Vitals:  Vitals Value Taken Time   BP     Temp     Pulse 76 10/25/24 0915   Resp 25 10/25/24 0915   SpO2     Vitals shown include unfiled device data.    Electronically Signed By: KELSEA Macario CRNA  October 25, 2024  9:15 AM

## 2024-10-25 NOTE — ANESTHESIA PROCEDURE NOTES
"IPACK Procedure Note    Pre-Procedure   Staff -        CRNA: Amanda Aviles APRN CRNA       Performed By: CRNA       Location: pre-op       Procedure Start/Stop Times: 10/25/2024 7:15 AM and 10/25/2024 7:30 AM       Pre-Anesthestic Checklist: patient identified, IV checked, site marked, risks and benefits discussed, informed consent, monitors and equipment checked, pre-op evaluation, at physician/surgeon's request and post-op pain management  Timeout:       Correct Patient: Yes        Correct Procedure: Yes        Correct Site: Yes        Correct Position: Yes        Correct Laterality: Yes        Site Marked: Yes  Procedure Documentation  Procedure: IPACK       Laterality: right       Patient Position: supine       Skin prep: Chloraprep       Needle Type: insulated       Needle Gauge: 20.        Needle Length (Inches): 4        Ultrasound guided       1. Ultrasound was used to identify targeted nerve, plexus, vascular marker, or fascial plane and place a needle adjacent to it in real-time.       2. Ultrasound was used to visualize the spread of anesthetic in close proximity to the above referenced structure.       3. A permanent image is entered into the patient's record.       4. The visualized anatomic structures appeared normal.       5. There were no apparent abnormal pathologic findings.    Assessment/Narrative         The placement was negative for: blood aspirated, painful injection and site bleeding       Paresthesias: No.       Bolus given via needle..        Secured via.        Insertion/Infusion Method: Single Shot       Complications: none       Injection made incrementally with aspirations every 5 mL.    Medication(s) Administered   Bupivacaine 0.25% PF (Infiltration) - Infiltration   15 mL - 10/25/2024 7:20:00 AM  Medication Administration Time: 10/25/2024 7:15 AM      FOR [facility] ([facility] [place]) ONLY:   Pain Team Contact information: please page the Pain Team Via [employer]. Search \"Pain\". During daytime " hours, please page the attending first. At night please page the resident first.

## 2024-10-25 NOTE — OP NOTE
Preoperative Diagnosis: Right Knee Osteoarthritis    Postoperative Diagnosis: Right Knee Osteoarthritis    Procedure: Right Total Knee Arthroplasty    Surgeon: Wero Crowe MD    Assistants: Kelvin ACE    A skilled first assistant was required for patient positioning, retracting, and carrying out the procedure in a safe and effective manner    Anesthesia:   1) Spinal with Sedation  2) Adductor Canal Femoral Nerve Block  3) Local Injection around surgical site    Findings:    1) Tricompartmental OA   2) Stable TKA with ROM 0-125   3) Central Patella Tracking   4) Adequate hemostasis     Implants:    1) Sofie persona Size 7 Femoral Component   2) Size F Osteoti Tibial Base Plate   3) Size 32 Patella   4) 11mm PS Poly Insert    Tourniquet Time: Not Used    Estimated Blood Loss: 100 ml    Specimens: None    Drains: None    Complications: None    Indications:   This is a 64 year old patient with long standing right knee pain.  They have failed nonoperative treatment including use of NSAIDs; Tylenol; injections and exercise.  Given the continued symptoms they wished to proceed with a knee replacement.  The risks including pain, bleeding, infection, deep vein thrombosis, pulmonary embolism, myocardial infarction, component failure, need for revision operation was discussed with the patient.  The surgical consent was signed and surgical site was marked.  All questions regarding postoperative disposition were answered.      Description of Procedure:   The patient was administered a adductor canal femoral nerve block in the preoperative area.  They were then taken to the operating room and placed under spinal anesthesia.  A non-sterile tourniquet was applied and the right leg was prepped with a Chloraprep wipe and Chloraprep device and was draped in standard fashion.  A timeout was called to identify the correct patient and surgical site.  A midline incision and medial parapatellar arthrotomy was completed.  Adequate  medial and lateral releases were completed.  The patella was everted and 9.0 mm of bone resection was completed using the patella clamp.  The patella was sized to 32 mm and the lug holes were drilled.  The patella protector plate was placed.  The knee was flexed and the intramedullary canal was drilled.  The intramedullary 5 degree Right distal femoral guide was placed.  Standard bone resection was completed.  The posterior referencing femoral sizing guide was placed and the femur was sized to a 7.  This cutting guide was placed and the 4 chamfer cuts were made.  The ACL, PCL, Medial and Lateral Meniscus were resected.  The tibia was subluxed anteriorly and the posterior retractor was placed.  The extramedullary proximal tibia cutting guide and made 10 mm of bone resection off the less involved lateral compartment.  The flexion and extension gaps were checked and pins were removed.  The Femoral component was placed and the box cut for the posterior stabilizing component was made.  All trial implants were placed and the knee was brought out to extension.  The rotation of the tibial trial was marked.  The knee came out to full extension and flexed to 125 degrees with central patellar tracking.  The tibia was sized to a F and prepared in standard fashion.  The periarticular injection was completed and the bone ends were washed and dried.  Final implants were cemented in place.  Betadine and pulse lavage irrigation was used.   A 11 mm PS poly insert was chosen and confirmed knee ROM and patella tracking.  The arthrotomy was closed with a #1 Vicryl suture in interrupted fashion.  A 0 Vicryl running suture was used in the deep fascia.  The skin was closed with a 2-0 Vicryl and staples.  An Aquacel dressing was applied.  The patient was awakened and transferred to PACU in stable condition.      Postoperative Plan:   Routine TKA protocol (Weightbearing as tolerated, PT, Pain control, DVT prophylaxis with Aspirin).   Anticipate discharge in 1-2 days.  Follow-up in  2 weeks in [facility].  No X-rays needed.

## 2024-10-25 NOTE — INTERVAL H&P NOTE
Brief History of Illness:   Bird Cedeno is a 64 year old male who was admitted for right knee pain    H&P reviewed and patient examined with no new updates from prior exam

## 2024-10-25 NOTE — ANESTHESIA POSTPROCEDURE EVALUATION
Patient: Bird Cedeno    Procedure: Procedure(s):  Right Total Knee Replacement       Anesthesia Type:  Spinal    Note:  Disposition: Outpatient   Postop Pain Control: Uneventful            Sign Out: Well controlled pain   PONV: No   Neuro/Psych: Uneventful            Sign Out: Acceptable/Baseline neuro status   Airway/Respiratory: Uneventful            Sign Out: Acceptable/Baseline resp. status   CV/Hemodynamics: Uneventful            Sign Out: Acceptable CV status; No obvious hypovolemia; No obvious fluid overload   Other NRE: NONE   DID A NON-ROUTINE EVENT OCCUR? No       Last vitals:  Vitals Value Taken Time   /92 10/25/24 1035   Temp 98.9  F (37.2  C) 10/25/24 1035   Pulse 87 10/25/24 1035   Resp 21 10/25/24 1036   SpO2 91 % 10/25/24 1037   Vitals shown include unfiled device data.    Electronically Signed By: KELSEA Ball CRNA  October 25, 2024  2:54 PM

## 2024-10-25 NOTE — ANESTHESIA PROCEDURE NOTES
Adductor canal Procedure Note    Pre-Procedure   Staff -        CRNA: Amanda Aviles APRN CRNA       Performed By: CRNA       Location: pre-op       Procedure Start/Stop Times: 10/25/2024 7:15 AM and 10/25/2024 7:30 AM       Pre-Anesthestic Checklist: patient identified, IV checked, site marked, risks and benefits discussed, informed consent, monitors and equipment checked, pre-op evaluation, at physician/surgeon's request and post-op pain management  Timeout:       Correct Patient: Yes        Correct Procedure: Yes        Correct Site: Yes        Correct Position: Yes        Correct Laterality: Yes        Site Marked: Yes  Procedure Documentation  Procedure: Adductor canal       Laterality: right       Patient Position: supine       Skin prep: Chloraprep       Needle Type: insulated       Needle Gauge: 20.        Needle Length (Inches): 4        Ultrasound guided       1. Ultrasound was used to identify targeted nerve, plexus, vascular marker, or fascial plane and place a needle adjacent to it in real-time.       2. Ultrasound was used to visualize the spread of anesthetic in close proximity to the above referenced structure.       3. A permanent image is entered into the patient's record.       4. The visualized anatomic structures appeared normal.       5. There were no apparent abnormal pathologic findings.    Assessment/Narrative         The placement was negative for: blood aspirated, painful injection and site bleeding       Paresthesias: No.       Bolus given via needle..        Secured via.        Insertion/Infusion Method: Single Shot       Complications: none       Injection made incrementally with aspirations every 5 mL.    Medication(s) Administered   Bupivacaine 0.25% PF (Infiltration) - Infiltration   20 mL - 10/25/2024 7:15:00 AM  Medication Administration Time: 10/25/2024 7:15 AM      FOR [facility] ([facility]) ONLY:   Pain Team Contact information: please page the Pain Team Via [employer]. Search  "\"Pain\". During daytime hours, please page the attending first. At night please page the resident first.      "

## 2024-10-25 NOTE — PROGRESS NOTES
10/25/24 1300   Appointment Info   Signing Clinician's Name / Credentials (PT) Raul Carroll DPT   Rehab Comments (PT) Pt sitting up on edge of post-op hospital bed and RN finishing up getting non-skid socks on pt.  His friend Libby was present bedside.   Living Environment   People in Home other (see comments)   Current Living Arrangements house   Home Accessibility stairs to enter home   Number of Stairs, Main Entrance 1   Stair Railings, Main Entrance none   Transportation Anticipated family or friend will provide;car, drives self   Living Environment Comments Pt will be staying at his mother's home initially upon D/C which is 1 level with 1 SHAWN.  The bathroom has a tub/shower combo with grab bar and shower chair.  Toilet is standard height with no formal grab bar, but walls nearby for support.  His mother may be there at times, his friend Libby will help some as needed and he will also have help from another friend and her .   Self-Care   Usual Activity Tolerance good   Current Activity Tolerance fair   Equipment Currently Used at Home none   Fall history within last six months no   Activity/Exercise/Self-Care Comment Pt stated he was independent for all mobility and ADLs/IADLs at baseline.   General Information   Onset of Illness/Injury or Date of Surgery 10/25/24   Referring Physician Dr. Wero Crowe   Patient/Family Therapy Goals Statement (PT) Return home today and start outpatient PT as scheduled   Pertinent History of Current Problem (include personal factors and/or comorbidities that impact the POC) Pt underwent R TKA earlier today with Dr. Crowe d/t chronic R knee pain and arthritis.   Weight-Bearing Status - RLE weight-bearing as tolerated   Cognition   Affect/Mental Status (Cognition) WFL   Orientation Status (Cognition) oriented x 4   Follows Commands (Cognition) WFL   Pain Assessment   Patient Currently in Pain Yes, see Vital Sign flowsheet  (1-2/10  R knee pain)   Integumentary/Edema    Integumentary/Edema Comments No pedal edema and unable to visualize pt's R knee or post-op dressing as pt had post-op ACE wraps in place foot to mid thigh.   Posture    Posture Forward head position   Range of Motion (ROM)   ROM Comment R knee PROM = 15* - 96*; remaining L and RLE ROM grossly WFL.   Strength (Manual Muscle Testing)   Strength Comments Mod functional R knee/RLE weakness   Bed Mobility   Bed Mobility supine-sit;sit-supine   Supine-Sit [place] (Bed Mobility) modified independence   Sit-Supine [place] (Bed Mobility) modified independence   Assistive Device (Bed Mobility)   (None)   Comment, (Bed Mobility) Pt liftd RLE on/off bed with only mild difficulty.   Transfers   Transfers bed-chair transfer;sit-stand transfer   Transfer Safety Concerns Noted decreased step length;decreased weight-shifting ability   Impairments Contributing to Impaired Transfers pain;decreased strength   Bed-Chair Transfer   Assistive Device (Bed-Chair Transfers) walker, front-wheeled   Bed-Chair [place]win (Transfers) contact guard;supervision  (CGA/SBA)   Sit-Stand Transfer   Sit-Stand Gladwin (Transfers) supervision  (SBA)   Assistive Device (Sit-Stand Transfers) walker, front-wheeled   Comment, (Sit-Stand Transfer) From bed and transport chair.   Gait/Stairs (Locomotion)   [place]adwin Level (Gait) contact guard;supervision  (CGA/SBA)   Assistive Device (Gait) walker, front-wheeled   Distance in Feet (Gait) 60   Pattern (Gait) other (see comments)   Negotiation (Stairs) stairs independence;stairs assistive device;number of steps;ascending technique;descending technique   [place]win Level (Stairs) contact guard   Assistive Device (Stairs)   (FWW)   Number of Steps (Stairs) 1   Ascending Technique (Stairs) step-to-step   Descending Technique (Stairs) step-to-step   Comment, (Gait/Stairs) Pt amb approx 60 ft with partial step through pattern with LLE d/t mild antalgic on RLE.  Fair R knee flxn during during  "swing phase and poor R knee ext during midstances.  Pt also with decreased heel strike bilat, but this seems to be chronic d/t his Parkinson's disease as pt also min tremulous throughout body at times during gait.  Pt exhibited Fair+/Goood- stability.  Pt only has 1 plaltform step to enter his home and large enough for FWW so to simulate had pt step on/off an approximately 4\" high scale step with FWW over top of it - CGA with cues to ascend leading with LLE and descend leading with RLE.   Balance   Balance Comments Sitting balance = WFL.  Standing balance = Fair+/Good w/ FWW support.   Sensory Examination   Sensory Perception Comments Pt only noting mild N/T around R knee.   Coordination   Coordination no deficits were identified   Muscle Tone   Muscle Tone Comments Pt did not really exhibit resting tremor, but did exhibit intention tremor throughout body during gait as noted above.  Pt exhibited min/mild trunk rigidity as well.   Clinical Impression   Criteria for Skilled Therapeutic Intervention Yes, treatment indicated   PT Diagnosis (PT) Gait disturbance   Influenced by the following impairments R knee stiffness; R knee pain; RLE weakness; decreased balance.   Functional limitations due to impairments Gait, transfers, stairs   Clinical Presentation (PT Evaluation Complexity) stable   Clinical Presentation Rationale Clinical judgment   Clinical Decision Making (Complexity) low complexity   Planned Therapy Interventions (PT) gait training;home exercise program;progressive activity/exercise   Risk & Benefits of therapy have been explained evaluation/treatment results reviewed;care plan/treatment goals reviewed;risks/benefits reviewed;current/potential barriers reviewed;participants voiced agreement with care plan;participants included;patient;friend   Clinical Impression Comments PT eval completed and pt demonstrated mild/mod difficulty with functional mobility r/t R knee pain, stiffness and weakness s/p R TKA " "earlier today.  PT would benefit from skilled acute care PT services today for gait training and instruction on basic TKA home program.   PT Total Evaluation Time   PT Eval, Low Complexity Minutes (75349) 25   Physical Therapy Goals   PT Frequency One time eval and treatment only   PT Predicted Duration/Target Date for Goal Attainment 10/25/24   PT Goals PT Goal 1   PT: Goal 1 Pt instructed on and completed basic TKA home program - MET   Interventions   Interventions Quick Adds Gait Training;Therapeutic Procedure   Therapeutic Procedure/Exercise   Ther. Procedure: strength, endurance, ROM, flexibillity Minutes (54644) 20   Symptoms Noted During/After Treatment increased pain   Treatment Detail/Skilled Intervention Pt was instructed on and completed ther ex for improving R knee ROM and strength (for HEP).  Seated LAQ x 10 reps RLE; seated R knee flxn AAROM with LLE assist; supine bilat quad sets 10 x 5\" with Poor+ R quad activation; RLE heel slides for R knee flxn AAROM with strap assiist x 10 reps.  Pt needed max verbal and mod to max tactile cues repeatedly to complete each exercise properly except LAQ pt completed properly first time.  Pt was also issued a printout of the exercises and reviewed with pt and his friend Libby importance of completing them 2x/day along with walking at least short walks every 1-2 waking hours and using cold packs to help manage pain/inflammation.   Gait Training   Gait Training Minutes (54021) 10   Symptoms Noted During/After Treatment (Gait Training) increased pain   Treatment Detail/Skilled Intervention Pt amb another 60 ft also CGA/SBA with FWW demonstrating similar gait, but with cues for symmetrical step through pattern and to exaggerate R knee flxn during swing phase and especially knee ext during stance phase pt did exhibit mild improvement of gait quality.  Pt also needed cues for proper hand placement with sit<->stand and to move R foot forward during sit<->stand to help " minimize pain.   PT Discharge Planning   PT Plan Discharge acute care PT as pt will be discharging home this afternoon.   PT Discharge Recommendation (DC Rec) home with assist;home with outpatient physical therapy   PT Rationale for DC Rec Pt does appear safe to D/C home with assist from friends/family and has HEP in place to complete while waiting to start outpatient PT on 11/9/24.   PT Brief overview of current status Sit<->stand = SBA; amb x 60 ft CGA/SBA with FWW; 1 step = CGA with FWW.   Physical Therapy Time and Intention   Timed Code Treatment Minutes 30   Total Session Time (sum of timed and untimed services) 55   Psychosocial Support   Trust Relationship/Rapport care explained;choices provided;questions answered;questions encouraged;reassurance provided;thoughts/feelings acknowledged

## 2024-10-25 NOTE — ANESTHESIA PROCEDURE NOTES
Genicular Procedure Note    Pre-Procedure   Staff -        CRNA: Amanda Aviles APRN CRNA       Performed By: CRNA       Location: pre-op       Procedure Start/Stop Times: 10/25/2024 7:15 AM and 10/25/2024 7:30 AM       Pre-Anesthestic Checklist: patient identified, IV checked, site marked, risks and benefits discussed, informed consent, monitors and equipment checked, pre-op evaluation, at physician/surgeon's request and post-op pain management  Timeout:       Correct Patient: Yes        Correct Procedure: Yes        Correct Site: Yes        Correct Position: Yes        Correct Laterality: Yes        Site Marked: Yes  Procedure Documentation  Procedure: Genicular       Laterality: right       Patient Position: supine       Skin prep: Chloraprep       Needle Type: insulated       Needle Gauge: 20.        Needle Length (Inches): 4        Ultrasound guided       1. Ultrasound was used to identify targeted nerve, plexus, vascular marker, or fascial plane and place a needle adjacent to it in real-time.       2. Ultrasound was used to visualize the spread of anesthetic in close proximity to the above referenced structure.       3. A permanent image is entered into the patient's record.       4. The visualized anatomic structures appeared normal.       5. There were no apparent abnormal pathologic findings.    Assessment/Narrative         The placement was negative for: blood aspirated, painful injection and site bleeding       Paresthesias: No.       Bolus given via needle. no blood aspirated via catheter.        Secured via.        Insertion/Infusion Method: Single Shot       Complications: none       Injection made incrementally with aspirations every 5 mL.    Medication(s) Administered   Bupivacaine 0.25% PF (Infiltration) - Infiltration   15 mL - 10/25/2024 7:25:00 AM  Medication Administration Time: 10/25/2024 7:15 AM      FOR [facility] ([facility] [place]) ONLY:   Pain Team Contact information: please page the Pain Team  "Via [employer]. Search \"Pain\". During daytime hours, please page the attending first. At night please page the resident first.      "

## 2024-10-25 NOTE — OR NURSING
Ambulated to bathroom, with SBA and with wheeled walker, unable to void. Back to room, pushing fluids at this time.

## 2024-10-25 NOTE — BRIEF OP NOTE
[facility]    Brief Operative Note    Pre-operative diagnosis: Arthrosis of knee [M17.10]  Post-operative diagnosis Same as pre-operative diagnosis    Procedure: Right Total Knee Replacement, Right - Knee    Surgeon: Surgeons and Role:     * Wero Crowe MD - Primary     * Kelvin Vaca PA-C - Assisting  Anesthesia: General with Block   Estimated Blood Loss: Less than 50 ml    Drains: None  Specimens: * No specimens in log *  Findings:   None.  Complications: None.  Implants:   Implant Name Type Inv. Item Serial No.  Lot No. LRB No. Used Action   IMP SCR ZIM PSN FEMALE 2.5MM -902-25 - [id number] Metallic Hardware/Orient IMP SCR ZIM PSN FEMALE 2.5MM -908-25  [employer]  Right 1 Implanted   BONE CEMENT SIMPLEX FULL DOSE 6191-1-001 - [id number] Cement, Bone BONE CEMENT SIMPLEX FULL DOSE 6191-1-001  [employer]  Right 1 Implanted   IMP COMP PATELLA ZIM ALL POLY 32MM STD -664-32 - [id number] Total Joint Component/Insert IMP COMP PATELLA ZIM ALL POLY 32MM STD -218-32  [employer]  Right 1 Implanted   INSERT TIBIAL CR 11MM VE R 6-9 EF [id number] - [id number] Total Joint Component/Insert INSERT TIBIAL CR 11MM VE R 6-9 EF [id number]  [employer]  Right 1 Implanted   Persona The Personalized Knee System     [id number] Right 1 Implanted   Persona The Personalized Knee System [employer]     [id number] Right 1 Implanted

## 2024-10-25 NOTE — DISCHARGE INSTRUCTIONS
"  YOU HAVE AN FOLLOW UP APPOINTMENT IN [place] WITH [facility] ON NOVEMBER 8TH AT 10:15 A.M.         If you have any questions or concerns, please call the [facility] Triage Line at [phone].          IMPORTANT OBSERVATIONS  Check C-M-S of operative extremity every 4 hours while you are awake for the first 48 hours:    * C: color - should appear normal/pink   * M: motion - able to move fingers and toes.   * S: sensation - able to \"feel\": no numbness, tingling  If you experience changes in C-M-S and/or in severe pain, you may remove the elastic bandages and reapply ot - tight enough to provide support for the gauze dressing but loose enough to improve C-M-S. The gauze dressing should NOT be removed when rewrapping the elastic bandage.             Nerve Block    Today you received a block to numb the nerves near your surgery site.    This is a block using local anesthetic or \"numbing\" medication injected around the nerves to anesthetize or \"numb\" the area supplied by those nerves. This block is injected into the muscle layer near your surgical site.      Diet: There are no diet restrictions, but you should drink plenty of fluids, unless you are on a fluid-restricted diet.     Activity: If your surgical site is an arm or leg you should be careful with your affected limb, since it is possible to injure your limb without being aware of it due to the numbing. Until full feeling returns, you should guard against bumping or hitting your limb, and avoid extreme hot or cold temperatures on the skin.    After you go home: As the block wears off, the feeling will return as a tingling or prickly sensation near your surgical site. This may also feels like pins and needles. This is a normal sensation. You will experience more discomfort from your incisions as the feeling returns. You may want to take a pain pill (a narcotic or Tylenol if this was prescribed by your surgeon) when you start to experience " mild pain, before the pain becomes more severe. It is important to stay on top of your pain, so when the block wears off, your pain will be easier to control. This may mean needing to schedule medication throughout the night. If your pain medications do not control your pain, you should notify your surgeon. If you are taking narcotics for pain management, do not drink alcohol, drive a car, or perform hazardous activities.  If you have questions or concerns you may call your surgeon at the number provided with your discharge instructions.     Call if you have any of these signs of infection:  Fever of 101 F (38 C) or higher, or as directed  Bleeding or swelling that increases  A red, hard, hot, or painful area around the injection site.  Shortness of breath  Chest pain    Call your surgeon if you experience blurry vision, ringing in the ears or metallic taste in your mouth.     After Anesthesia (Sleep Medicine)  What should I do after anesthesia?  You should rest and relax for the next 24 hours. Avoid risky or difficult (strenuous) activity. A responsible adult should stay with you overnight.  Don't drive or use any heavy equipment for 24 hours. Even if you feel normal, your reactions may be affected by the sleep medicine given to you.  Don't drink alcohol or make any important decisions for 24 hours.  Slowly get back to your regular diet, as you feel able.  How should I expect to feel?  It's normal to feel dizzy, light-headed, or faint for up to a full day after anesthesia or while taking pain medicine. If this happens:   Sit down for a few minutes before standing.  Have someone help you when you get up to walk or use the bathroom.  If you have nausea (feel sick to your stomach) or vomit (throw up):   Drink clear liquids (such as apple juice, ginger ale, broth, or 7UP) until you feel better.  If you feel sick to your stomach, or you keep vomiting for 24 hours, please call the doctor.  What else should I know?  You  might have a dry mouth, sore throat, muscle aches, or trouble sleeping. These should go away after 24 hours.  Please contact your doctor if you have any other symptoms that concern you, such as fever, pain, bleeding, fluid drainage, swelling, or headache, or if it's been over 8 to 10 hours and you still aren't able to pee (urinate).  If you have a history of sleep apnea, it's very important to use your CPAP machine for the next 24 hours when you nap or sleep.   For informational purposes only. Not to replace the advice of your health care provider. Copyright   2023 [employer] [facility]. All rights reserved. Clinically reviewed by Freeman Watts MD. [employer] 105223 - REV 09/23.  Constipation: Care Instructions  Overview     Constipation means that you have a hard time passing stools (bowel movements). People pass stools from 3 times a day to once every 3 days. What is normal for you may be different. Constipation may occur with pain in the rectum and cramping. The pain may get worse when you try to pass stools. Sometimes there are small amounts of bright red blood on toilet paper or the surface of stools. This is because of enlarged veins near the rectum (hemorrhoids).  A few changes in your diet and lifestyle may help you avoid ongoing constipation. Your doctor may also prescribe medicine to help loosen your stool.  Some medicines can cause constipation. These include pain medicines and antidepressants. Tell your doctor about all the medicines you take. Your doctor may want to make a medicine change to ease your symptoms.  Follow-up care is a key part of your treatment and safety. Be sure to make and go to all appointments, and call your doctor if you are having problems. It's also a good idea to know your test results and keep a list of the medicines you take.  How can you care for yourself at home?  Drink plenty of fluids. If you have kidney, heart, or liver disease and have to limit fluids, talk with  "your doctor before you increase the amount of fluids you drink.  Include high-fiber foods in your diet each day. These include fruits, vegetables, beans, and whole grains.  Get at least 30 minutes of exercise on most days of the week. Walking is a good choice. You also may want to do other activities, such as running, swimming, cycling, or playing tennis or team sports.  Take a fiber supplement, such as Citrucel or Metamucil, every day. Read and follow all instructions on the label.  Schedule time each day for a bowel movement. A daily routine may help. Take your time having a bowel movement, but don't sit for more than 10 minutes at a time. And don't strain too much.  Support your feet with a small step stool when you sit on the toilet. This helps flex your hips and places your pelvis in a squatting position.  Your doctor may recommend an over-the-counter laxative to relieve your constipation. Examples are Milk of Magnesia and MiraLax. Read and follow all instructions on the label. Do not use laxatives on a long-term basis.  When should you call for help?   Call your doctor now or seek immediate medical care if:    You have new or worse belly pain.     You have new or worse nausea or vomiting.     You have blood in your stools.   Watch closely for changes in your health, and be sure to contact your doctor if:    Your constipation is getting worse.     You do not get better as expected.   Where can you learn more?  Go to https://www.[employer].net/patiented  Enter P343 in the search box to learn more about \"Constipation: Care Instructions.\"  Current as of: October 19, 2023  Content Version: 14.2 2024 [facility] [employer].   Care instructions adapted under license by your healthcare professional. If you have questions about a medical condition or this instruction, always ask your healthcare professional. [employer] disclaims any warranty or liability for your use of this information.    "

## 2024-10-25 NOTE — ANESTHESIA PROCEDURE NOTES
"Intrathecal injection Procedure Note    Pre-Procedure   Staff -        CRNA: Amanda Aviles APRN CRNA       Performed By: CRNA       Location: OR       Procedure Start/Stop Times: 10/25/2024 7:45 AM and 10/25/2024 7:51 AM  Timeout:       Correct Patient: Yes        Correct Procedure: Yes        Correct Site: Yes        Correct Position: Yes   Procedure Documentation  Procedure: intrathecal injection       Patient Position: sitting       Patient Prep/Sterile Barriers: sterile gloves, mask, patient draped       Skin prep: Betadine       Insertion Site: L3-4. (midline approach).       Needle Gauge: 25.        Needle Length (Inches): 3        Spinal Needle Type: Juan tip       Introducer used       # of attempts: 1 and  # of redirects:  0    Assessment/Narrative         Paresthesias: No.       CSF fluid: clear.       Opening pressure was cmH2O while  Sitting.      Medication(s) Administered   Medication Administration Time: 10/25/2024 7:45 AM     Comments:  No complication easy      FOR [facility] ([facility]) ONLY:   Pain Team Contact information: please page the Pain Team Via [employer]. Search \"Pain\". During daytime hours, please page the attending first. At night please page the resident first.      "

## 2024-10-26 NOTE — PROGRESS NOTES
Surgeon: Dr. Crowe  [facility]: Auroradebra Manrique  Name of Surgery:Right Total Knee Replacement  Date of Surgery: 10/25/24    Hello,   I am a Registered Nurse calling from ([place]) to check in and see how you are doing following your joint replacement surgery today.    Is your pain level manageable? yes If not, have you been taking your pain medications as prescribed? Have you tried icing and elevating your surgical extremity?    Are you having any new or increased numbness or tingling in your surgical leg? no  Are you having increased swelling around your surgical site? no  Are you having any drainage from your incision? no If so, is the drainage saturating or coming through your dressing?    Have you been able to walk short distances around your home with a walker? yes  Have you been able to urinate since surgery? yes  Do you have any other questions or concerns at this time? Yes, asked about timing of the oxycodone and read him the discharge instructions for it  Do you have your surgeons number if you have any concerns overnight? No, explained it was in discharge instructions. If no, the number is written on their discharge instructions for [facility], [phone].    **If the patient answers yes to 2,3,4, or 7, please document concerns and have the patient follow-up with [facility] at [phone] or present to the Emergency Room.

## 2024-10-28 NOTE — OR NURSING
Patient and responsible adult given discharge instructions with no questions regarding instructions. Mathew score 19/20. Pain level 2/10.  Discharged from unit via wheelchair with friend. . Patient discharged to home;copy of AVS sent. No questions prior to discharge. Pt voided small amount prior to discharge, ambulated and worked with PT with walker, walker sent with pt/ Tolerated oral intake, no c/o any at time of discharge.

## 2024-11-25 ENCOUNTER — TRANSFERRED RECORDS (OUTPATIENT)
Dept: HEALTH INFORMATION MANAGEMENT | Facility: CLINIC | Age: 64
End: 2024-11-25

## 2024-11-25 LAB — EJECTION FRACTION: 62 %

## 2024-12-04 ENCOUNTER — TELEPHONE (OUTPATIENT)
Dept: INTERNAL MEDICINE | Facility: OTHER | Age: 64
End: 2024-12-04

## 2024-12-05 NOTE — TELEPHONE ENCOUNTER
Call returned to AMARJIT Garcia with [facility]. Jose evaluated Bird Cedeno for admission for rehabilitation - TKA RT- Infection at [facility].    Patient is currently on IV abx followed by [facility] Dr. Chung and Ortho Surgeon [facility] Dr. Cruz.    Patient experienced an increase in tremors during hospitalization.     Sinemet was increased to 1 1/2 tablets 4 x daily during hospital admission.     Patient is requesting to increase sinemet to 25/100,000 1 1/2 tablets- 5 times per day  with one prn, NP is unable to adjust the dose, must come from PCP.     The dose increase is dose patient was taking at home prior to admission.

## 2024-12-19 ENCOUNTER — TELEPHONE (OUTPATIENT)
Dept: INTERNAL MEDICINE | Facility: OTHER | Age: 64
End: 2024-12-19

## 2024-12-19 NOTE — TELEPHONE ENCOUNTER
12:59 PM    Reason for Call: Phone Call    Description: Jesus with [facility] called. Pt is trying to get released. Needs order for PICC med to be changed to oral and order for a discharge of PICC line. Please call Jesus with [facility] and fax orders over fax # is [phone].    Was an appointment offered for this call? No  If yes : Appointment type              Date    Preferred method for responding to this message: Telephone Call  What is your phone number ? [phone]    If we cannot reach you directly, may we leave a detailed response at the number you provided? Yes    Can this message wait until your PCP/provider returns, if available today? Rekha Bryson

## 2024-12-20 NOTE — TELEPHONE ENCOUNTER
Jeanie with [facility] returned call, no answer when tried RN number. Please return call [phone], call back anytime, OK to leave detailed voicemail

## 2024-12-20 NOTE — TELEPHONE ENCOUNTER
Call returned to Jan with [facility]. Voicemail message left requesting return call to [phone] option 6.    Dr. Leal is out of the office on Vacation for the remainder of the month.     Awaiting return call to discuss further.

## 2024-12-23 NOTE — TELEPHONE ENCOUNTER
Call returned to Jeanie with [facility],notified the order to discontinue the PICC Line would need to be provided by the provider who placed the PICC line.     Jeanie reports receiving the order to discontinue the PICC Line from the ordering provider and patient discharged from [facility] on 12/23/24.    No additional questions/concerns.

## 2025-03-17 DIAGNOSIS — I10 PRIMARY HYPERTENSION: ICD-10-CM

## 2025-03-17 RX ORDER — AMLODIPINE BESYLATE 10 MG/1
10 TABLET ORAL DAILY
Qty: 30 TABLET | Refills: 0 | Status: SHIPPED | OUTPATIENT
Start: 2025-03-17

## 2025-03-17 NOTE — TELEPHONE ENCOUNTER
Call placed to patient to discuss Establish Care Appointment with new PCP as Dr. Leal has transitioned to [facility]. No answer, message left requesting a return call.     Upon review patient cancelled scheduled appointment - 12/5/25 with Dr. Leal, 12/17/25- New Office Visit - Dr. Tineo, 2/18/25- New Office Visit with Dr. Tineo.     Awaiting return call to discuss.     LOV: 10/15/24- Dr. Leal.     Sending to Dr. Stewart for review/update.

## 2025-03-17 NOTE — TELEPHONE ENCOUNTER
amLODIPine (NORVASC) 10 MG tablet       Last Written Prescription Date:  10/08/2024  Last Fill Quantity: 90,   # refills: 2  Last Office Visit: 11/24/2024

## 2025-03-17 NOTE — TELEPHONE ENCOUNTER
Patient returned call reports he had an appointment scheduled with Dr. Lynn at [facility] in [place] for today, 3/17/25. Dr. Lynn was out of the office today, appointment was rescheduled for 3/28/25.     Patient provided with an update Milag[facility] Cedeno has refilled #30 tablets until patient is able to get in for the [facility] appointment. Patient verbalized understanding.

## (undated) DEVICE — DRILL PIN HEADLESS TROCAR 00-5901-020-00

## (undated) DEVICE — BONE CEMENT MIXEVAC III HI VAC KIT  0206-015-000

## (undated) DEVICE — PACK KNEE CUSTOM SOP32TKMB7

## (undated) DEVICE — BLADE SAGITTAL DUAL CUT 25MM X 100MM X 1.27MM BR4125-127-100

## (undated) DEVICE — CLEANSER JET LAVAGE IRRISEPT 0.05% CHG IRRISEPT45USA

## (undated) DEVICE — HOOD STRYKER FOR ORTHO 0408-800-400

## (undated) DEVICE — COVER LT HANDLE 2/PK 5160-2FG

## (undated) DEVICE — DRSG AQUACEL AG HYDROFIBER  3.5X10" 422605

## (undated) DEVICE — ESU GROUND PAD ADULT W/CORD E7507

## (undated) DEVICE — PREP CHLORAPREP 26ML TINTED HI-LITE ORANGE 930815

## (undated) DEVICE — PAD POSITIONING KNEE SELF ADHESIVE MPR100211

## (undated) DEVICE — SU DERMABOND ADVANCED .7ML DNX12

## (undated) DEVICE — SOL NACL 0.9% IRRIG 3000ML BAG 2B7477

## (undated) DEVICE — ESU SUCTION CAUTERY HANDSWITCH 12FR E3305

## (undated) DEVICE — SUCTION MANIFOLD NEPTUNE 2 SYS 4 PORT 0702-020-000

## (undated) DEVICE — SLEEVE SCD EXPRESS KNEE LENGTH MED 9529

## (undated) DEVICE — BLADE SAW RECIP STRK 70X12.5X0.80MM 0277-096-277

## (undated) DEVICE — PULSE LAVAGE IRRIGATION SYSTEM 0210-114-100

## (undated) DEVICE — SU VICRYL 1 CT-1 CR 8X18" J741D

## (undated) DEVICE — LABEL STERILE PREPRINTED FOR OR FRRH01-2M

## (undated) DEVICE — PACK BASIN SET UP SUTCNBSBBA

## (undated) DEVICE — SOL WATER IRRIG 1000ML BOTTLE 2F7114

## (undated) DEVICE — TOURNIQUET SGL  BLADDER 30"X4" BLUE 5921030135

## (undated) DEVICE — SU VICRYL 2-0 CT-1 36" UND J945H

## (undated) DEVICE — BLADE REAMER 46MM STAINLESS STEEL 00597909546

## (undated) DEVICE — BLADE SAGITTAL 18MM X 90MM X 1.27MM BR4118-127-090

## (undated) RX ORDER — BUPIVACAINE HYDROCHLORIDE 2.5 MG/ML
INJECTION, SOLUTION EPIDURAL; INFILTRATION; INTRACAUDAL
Status: DISPENSED
Start: 2024-10-25

## (undated) RX ORDER — PROPOFOL 10 MG/ML
INJECTION, EMULSION INTRAVENOUS
Status: DISPENSED
Start: 2024-10-25

## (undated) RX ORDER — FENTANYL CITRATE 50 UG/ML
INJECTION, SOLUTION INTRAMUSCULAR; INTRAVENOUS
Status: DISPENSED
Start: 2024-10-25

## (undated) RX ORDER — KETOROLAC TROMETHAMINE 30 MG/ML
INJECTION, SOLUTION INTRAMUSCULAR; INTRAVENOUS
Status: DISPENSED
Start: 2024-10-25

## (undated) RX ORDER — HYDROMORPHONE HYDROCHLORIDE 1 MG/ML
INJECTION, SOLUTION INTRAMUSCULAR; INTRAVENOUS; SUBCUTANEOUS
Status: DISPENSED
Start: 2024-10-25